# Patient Record
Sex: FEMALE | Race: WHITE | NOT HISPANIC OR LATINO | Employment: PART TIME | URBAN - METROPOLITAN AREA
[De-identification: names, ages, dates, MRNs, and addresses within clinical notes are randomized per-mention and may not be internally consistent; named-entity substitution may affect disease eponyms.]

---

## 2017-01-13 ENCOUNTER — ALLSCRIPTS OFFICE VISIT (OUTPATIENT)
Dept: OTHER | Facility: OTHER | Age: 67
End: 2017-01-13

## 2017-01-13 DIAGNOSIS — N60.19 DIFFUSE CYSTIC MASTOPATHY OF BREAST: ICD-10-CM

## 2017-01-23 ENCOUNTER — GENERIC CONVERSION - ENCOUNTER (OUTPATIENT)
Dept: OTHER | Facility: OTHER | Age: 67
End: 2017-01-23

## 2017-02-20 ENCOUNTER — GENERIC CONVERSION - ENCOUNTER (OUTPATIENT)
Dept: OTHER | Facility: OTHER | Age: 67
End: 2017-02-20

## 2017-03-23 ENCOUNTER — GENERIC CONVERSION - ENCOUNTER (OUTPATIENT)
Dept: OTHER | Facility: OTHER | Age: 67
End: 2017-03-23

## 2017-04-06 ENCOUNTER — HOSPITAL ENCOUNTER (OUTPATIENT)
Dept: MAMMOGRAPHY | Facility: CLINIC | Age: 67
Discharge: HOME | End: 2017-04-06
Payer: MEDICARE

## 2017-04-06 DIAGNOSIS — N60.19 DIFFUSE CYSTIC MASTOPATHY OF BREAST: ICD-10-CM

## 2017-04-06 PROCEDURE — G0202 SCR MAMMO BI INCL CAD: HCPCS

## 2017-04-06 PROCEDURE — 77063 BREAST TOMOSYNTHESIS BI: CPT

## 2017-04-20 ENCOUNTER — ALLSCRIPTS OFFICE VISIT (OUTPATIENT)
Dept: OTHER | Facility: OTHER | Age: 67
End: 2017-04-20

## 2017-04-20 ENCOUNTER — GENERIC CONVERSION - ENCOUNTER (OUTPATIENT)
Dept: OTHER | Facility: OTHER | Age: 67
End: 2017-04-20

## 2017-04-20 DIAGNOSIS — J45.909 UNCOMPLICATED ASTHMA: ICD-10-CM

## 2017-04-20 DIAGNOSIS — M81.0 AGE-RELATED OSTEOPOROSIS WITHOUT CURRENT PATHOLOGICAL FRACTURE: ICD-10-CM

## 2017-04-28 ENCOUNTER — GENERIC CONVERSION - ENCOUNTER (OUTPATIENT)
Dept: OTHER | Facility: OTHER | Age: 67
End: 2017-04-28

## 2017-05-31 ENCOUNTER — APPOINTMENT (OUTPATIENT)
Dept: LAB | Facility: HOSPITAL | Age: 67
End: 2017-05-31
Attending: INTERNAL MEDICINE
Payer: MEDICARE

## 2017-05-31 ENCOUNTER — GENERIC CONVERSION - ENCOUNTER (OUTPATIENT)
Dept: OTHER | Facility: OTHER | Age: 67
End: 2017-05-31

## 2017-05-31 ENCOUNTER — TRANSCRIBE ORDERS (OUTPATIENT)
Dept: ADMINISTRATIVE | Facility: HOSPITAL | Age: 67
End: 2017-05-31

## 2017-05-31 DIAGNOSIS — M81.0 AGE-RELATED OSTEOPOROSIS WITHOUT CURRENT PATHOLOGICAL FRACTURE: ICD-10-CM

## 2017-05-31 DIAGNOSIS — J45.909 UNCOMPLICATED ASTHMA: ICD-10-CM

## 2017-05-31 DIAGNOSIS — Z13.820 SCREENING FOR OSTEOPOROSIS: Primary | ICD-10-CM

## 2017-05-31 LAB
25(OH)D3 SERPL-MCNC: 19 NG/ML (ref 30–100)
ALBUMIN SERPL BCP-MCNC: 3.4 G/DL (ref 3.5–5)
ALP SERPL-CCNC: 92 U/L (ref 46–116)
ALT SERPL W P-5'-P-CCNC: 28 U/L (ref 12–78)
ANION GAP SERPL CALCULATED.3IONS-SCNC: 8 MMOL/L (ref 4–13)
AST SERPL W P-5'-P-CCNC: 19 U/L (ref 5–45)
BASOPHILS # BLD AUTO: 0.07 THOUSANDS/ΜL (ref 0–0.1)
BASOPHILS NFR BLD AUTO: 1 % (ref 0–1)
BILIRUB SERPL-MCNC: 0.6 MG/DL (ref 0.2–1)
BUN SERPL-MCNC: 20 MG/DL (ref 5–25)
CALCIUM SERPL-MCNC: 8.8 MG/DL (ref 8.3–10.1)
CHLORIDE SERPL-SCNC: 107 MMOL/L (ref 100–108)
CHOLEST SERPL-MCNC: 202 MG/DL (ref 50–200)
CO2 SERPL-SCNC: 28 MMOL/L (ref 21–32)
CREAT SERPL-MCNC: 0.88 MG/DL (ref 0.6–1.3)
EOSINOPHIL # BLD AUTO: 0.39 THOUSAND/ΜL (ref 0–0.61)
EOSINOPHIL NFR BLD AUTO: 5 % (ref 0–6)
ERYTHROCYTE [DISTWIDTH] IN BLOOD BY AUTOMATED COUNT: 12.9 % (ref 11.6–15.1)
GFR SERPL CREATININE-BSD FRML MDRD: >60 ML/MIN/1.73SQ M
GLUCOSE P FAST SERPL-MCNC: 95 MG/DL (ref 65–99)
HCT VFR BLD AUTO: 40.8 % (ref 34.8–46.1)
HDLC SERPL-MCNC: 58 MG/DL (ref 40–60)
HGB BLD-MCNC: 13.5 G/DL (ref 11.5–15.4)
LDLC SERPL CALC-MCNC: 128 MG/DL (ref 0–100)
LYMPHOCYTES # BLD AUTO: 1.85 THOUSANDS/ΜL (ref 0.6–4.47)
LYMPHOCYTES NFR BLD AUTO: 24 % (ref 14–44)
MCH RBC QN AUTO: 30.4 PG (ref 26.8–34.3)
MCHC RBC AUTO-ENTMCNC: 33.1 G/DL (ref 31.4–37.4)
MCV RBC AUTO: 92 FL (ref 82–98)
MONOCYTES # BLD AUTO: 0.62 THOUSAND/ΜL (ref 0.17–1.22)
MONOCYTES NFR BLD AUTO: 8 % (ref 4–12)
NEUTROPHILS # BLD AUTO: 4.81 THOUSANDS/ΜL (ref 1.85–7.62)
NEUTS SEG NFR BLD AUTO: 62 % (ref 43–75)
PLATELET # BLD AUTO: 312 THOUSANDS/UL (ref 149–390)
PMV BLD AUTO: 9.6 FL (ref 8.9–12.7)
POTASSIUM SERPL-SCNC: 4.5 MMOL/L (ref 3.5–5.3)
PROT SERPL-MCNC: 6.9 G/DL (ref 6.4–8.2)
RBC # BLD AUTO: 4.44 MILLION/UL (ref 3.81–5.12)
SODIUM SERPL-SCNC: 143 MMOL/L (ref 136–145)
TRIGL SERPL-MCNC: 78 MG/DL
WBC # BLD AUTO: 7.74 THOUSAND/UL (ref 4.31–10.16)

## 2017-05-31 PROCEDURE — 82306 VITAMIN D 25 HYDROXY: CPT

## 2017-05-31 PROCEDURE — 36415 COLL VENOUS BLD VENIPUNCTURE: CPT

## 2017-05-31 PROCEDURE — 80053 COMPREHEN METABOLIC PANEL: CPT

## 2017-05-31 PROCEDURE — 80061 LIPID PANEL: CPT

## 2017-05-31 PROCEDURE — 85025 COMPLETE CBC W/AUTO DIFF WBC: CPT

## 2017-06-02 ENCOUNTER — HOSPITAL ENCOUNTER (OUTPATIENT)
Dept: MAMMOGRAPHY | Facility: CLINIC | Age: 67
Discharge: HOME | End: 2017-06-02
Payer: MEDICARE

## 2017-06-02 DIAGNOSIS — M81.0 AGE-RELATED OSTEOPOROSIS WITHOUT CURRENT PATHOLOGICAL FRACTURE: ICD-10-CM

## 2017-06-15 ENCOUNTER — HOSPITAL ENCOUNTER (OUTPATIENT)
Dept: RADIOLOGY | Facility: HOSPITAL | Age: 67
Discharge: HOME | End: 2017-06-15
Payer: MEDICARE

## 2017-06-15 ENCOUNTER — TRANSCRIBE ORDERS (OUTPATIENT)
Dept: ADMINISTRATIVE | Facility: HOSPITAL | Age: 67
End: 2017-06-15

## 2017-06-15 ENCOUNTER — ALLSCRIPTS OFFICE VISIT (OUTPATIENT)
Dept: OTHER | Facility: OTHER | Age: 67
End: 2017-06-15

## 2017-06-15 DIAGNOSIS — R07.81 PLEURODYNIA: ICD-10-CM

## 2017-06-15 DIAGNOSIS — J98.01 ACUTE BRONCHOSPASM: ICD-10-CM

## 2017-06-15 PROCEDURE — 71101 X-RAY EXAM UNILAT RIBS/CHEST: CPT

## 2017-06-28 ENCOUNTER — HOSPITAL ENCOUNTER (OUTPATIENT)
Dept: BONE DENSITY | Facility: IMAGING CENTER | Age: 67
Discharge: HOME | End: 2017-06-28
Payer: MEDICARE

## 2017-06-28 DIAGNOSIS — Z13.820 SCREENING FOR OSTEOPOROSIS: ICD-10-CM

## 2017-06-28 PROCEDURE — 77080 DXA BONE DENSITY AXIAL: CPT

## 2017-06-30 ENCOUNTER — GENERIC CONVERSION - ENCOUNTER (OUTPATIENT)
Dept: OTHER | Facility: OTHER | Age: 67
End: 2017-06-30

## 2018-01-10 NOTE — RESULT NOTES
Verified Results  MAMMO SCREENING BILATERAL W 3D & CAD 06Apr2017 07:24AM Antonio Buckner Order Number: [id number]   Performing Comments: hx of dense breasts and cystic changes with prior biopsy   - Patient Instructions: To schedule this appointment, please contact Central Scheduling at [phone]  Do not wear any perfume, powder, lotion or deodorant on breast or    underarm area  Please bring your doctors order, referral (if needed) and insurance information with you on the day of the test  Failure to bring this information may result in this test being rescheduled  Arrive 15 minutes prior to your appointment time to register  On the day of your test, please bring any prior mammogram or breast studies with you that were not performed at a [facility]  Failure to bring prior exams may result in your test needing to be rescheduled  Test Name Result Flag Reference   MAMMO SCREENING BILATERAL W 3D & CAD (Report)     ADDENDUM:   Prior studies were made available to me  There is no change in    the findings or recommendations  Patient History:   Patient is postmenopausal    Took hormonal contraceptives for 15 years beginning at age 22  Patient has never smoked  Patient's BMI is 30 5  Reason for exam: screening, asymptomatic  Mammo Screening Bilateral W DBT and CAD: April 6, 2017 - Check In   #: [de-identified]   2D/3D Procedure   3D views: Bilateral MLO view(s) were taken  2D views: Bilateral MLO and CC view(s) were taken  Technologist: JEWELS Linton (R)(M)   The breast tissue is heterogeneously dense, potentially limiting    the sensitivity of mammography  Patient risk, included in this    report, assists in determining the appropriate screening regimen    (such as 3-D mammography or the inclusion of automated breast    ultrasound or MRI)  3-D mammography may also remain indicated as    screening  Multiple prior studies were available for comparison       No dominant soft tissue mass, architectural distortion or    suspicious calcifications are noted in either breast   The skin    and nipple structures are within normal limits  Scattered benign   appearing calcifications are noted  No significant changes when compared with prior studies  ACR BI-RADSï¾® Assessments: BiRad:2 - Benign     Recommendation:   Routine screening mammogram of both breasts in 1 year  A    reminder letter will be scheduled  8-10% of cancers will be missed on mammography  Management of a    palpable abnormality must be based on clinical grounds  Patients    will be notified of their results via letter from our facility  Accredited by [employer] of [employer] and FDA  Transcription Location: JEWELS [place] 98: [id number]     Risk Value(s):   Tyrer-Cuzick 10 Year: 2 200%, Tyrer-Cuzick Lifetime: 4 500%,    Myriad Table: 1 5%, CHAPIN 5 Year: 1 5%, NCI Lifetime: 5 6%   Patient History:   Patient is postmenopausal    Took hormonal contraceptives for 15 years beginning at age 22  Patient has never smoked  Patient's BMI is 30 5  Reason for exam: screening, asymptomatic  Mammo Screening Bilateral W DBT and CAD: April 6, 2017 - Check In   #: [de-identified]   2D/3D Procedure   3D views: Bilateral MLO view(s) were taken  2D views: Bilateral MLO and CC view(s) were taken  Technologist: JEWELS Watkins (R)(M)   The breast tissue is heterogeneously dense, potentially limiting    the sensitivity of mammography  Patient risk, included in this    report, assists in determining the appropriate screening regimen    (such as 3-D mammography or the inclusion of automated breast    ultrasound or MRI)  3-D mammography may also remain indicated as    screening  Multiple prior studies were available for comparison  No dominant soft tissue mass, architectural distortion or    suspicious calcifications are noted in either breast   The skin    and nipple structures are within normal limits   Scattered benign   appearing calcifications are noted  No significant changes when compared with prior studies  ACR BI-RADSï¾® Assessments: BiRad:2 - Benign     Recommendation:   Routine screening mammogram of both breasts in 1 year  A    reminder letter will be scheduled  8-10% of cancers will be missed on mammography  Management of a    palpable abnormality must be based on clinical grounds  Patients    will be notified of their results via letter from our facility  Accredited by [employer] and FDA       Transcription Location: [facility] 98: [id number]     Risk Value(s):   Tyrer-Cuzick 10 Year: 2 200%, Tyrer-Cuzick Lifetime: 4 500%,    Myriad Table: 1 5%, CHAPIN 5 Year: 1 5%, NCI Lifetime: 5 6%

## 2018-01-11 NOTE — PROGRESS NOTES
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)   3  GERD (gastroesophageal reflux disease) (530 81) (K21 9)   4  Asthma without status asthmaticus without complication (819 44) (B37 865)   5  Osteoporosis (733 00) (M81 0)    Plan  Asthma without status asthmaticus without complication    · (1) CBC/PLT/DIFF; Status:Active; Requested for:20Apr2017;    · (1) COMPREHENSIVE METABOLIC PANEL; Status:Active; Requested for:20Apr2017;    · (1) LIPID PANEL FASTING W DIRECT LDL REFLEX; Status:Active; Requested  for:20Apr2017;   Osteoporosis    · (1) VITAMIN D 25-HYDROXY; Status:Active; Requested for:20Apr2017;    · * DXA BONE DENSITY SPINE HIP AND PELVIS; Status:Hold For - Scheduling;  Requested for:20Apr2017;   Screening for genitourinary condition    · *VB - Urinary Incontinence Screen (Dx Z13 89 Screen for UI); Status:Complete -  Retrospective By Protocol Authorization;   Done: 20Apr2017 04:13PM    Discussion/Summary    1, do labs- fasting  2  History of Present Illness  Welcome to Medicare and Wellness Visits: The patient is being seen for the welcome to medicare visit  Medicare Screening and Risk Factors   Hospitalizations: no previous hospitalizations  Once per lifetime medicare screening tests: AAA screening US has not yet been done  Medicare Screening Tests Risk Questions   Osteoporosis risk assessment: female gender, over 48years of age and past medical history of fracture(s)  Drug and Alcohol Use: The patient has never smoked cigarettes and has never used smokeless tobacco  The patient reports occasional alcohol use  She has never used illicit drugs     Diet and Physical Activity: Current diet includes well balanced meals, 2 servings of fruit per day, 2 servings of vegetables per day, 1 servings of meat per day, 2 servings of whole grains per day, 3 servings of dairy products per day, 2-3 cups of coffee per day and 1 cups of tea per day  She exercises infrequently  Exercise: walking, stretching 1 1/2 hours per week  Mood Disorder and Cognitive Impairment Screening: She denies feeling down, depressed, or hopeless over the past two weeks  She denies feeling little interest or pleasure in doing things over the past two weeks  Functional Ability/Level of Safety: Hearing is normal bilaterally and a hearing aid is not used  The patient is currently able to do activities of daily living without limitations, able to do instrumental activities of daily living without limitations, able to participate in social activities without limitations and able to drive without limitations  Fall risk factors: The patient fell 0 times in the past 12 months  Advance Directives: Advance directives: given 5 wishes form, but no living will, no durable power of  for health care directives and no advance directives  end of life decisions were reviewed with the patient  Concerns with the patient's end of life decisions: would want full code- but would not want long term vent or support if no hope for recovery  Co-Managers and Medical Equipment/Suppliers: See Patient Care Team      Patient Care Team    Care Team Member Role Specialty Office Number   [place] [address]  Internal Medicine [phone]   Formerly [facility], ED   [phone]     Review of Systems    Constitutional: has had improvement in respiratory symotoms- had severe episde last year, but no fever and no chills  ENT: nasal congestion and nasal discharge  Cardiovascular: no chest pain  Respiratory: no shortness of breath, no wheezing, no cough and no dry cough  Gastrointestinal: negative  Genitourinary: negative  Active Problems    1  Advance directive discussed with patient (V65 49) (Z07 89)   2  Allergic rhinitis (477 9) (J30 9)   3  Asthma without status asthmaticus without complication (393 93) (E91 141)   4  Cataract (366 9) (H26 9)   5   Colon cancer screening (V76 51) (Z12 11)   6  Diffuse cystic mastopathy (610 1) (N60 19)   7  Encounter for other screening for malignant neoplasm of breast (V76 19) (Z12 39)   8  GERD (gastroesophageal reflux disease) (530 81) (K21 9)   9  Macular degeneration, wet (362 52) (H35 3290)   10  Denied: History of Mental health problem   11  Need for immunization against influenza (V04 81) (Z23)   12  Need for Tdap vaccination (V06 1) (Z23)   13  No advance directives (V49 89) (Z78 9)   14  Osteoporosis (733 00) (M81 0)   15  Pre-op examination (V72 84) (Z01 818)   16  Prophylactic antibiotic for dental procedure indicated due to prior joint replacement    (V58 62) (Z79 2)   17  Denied: History of Substance abuse    Past Medical History    · History of Closed femur fracture (821 00) (S72 90XA)   · History of Contact dermatitis due to poison ivy (692 6) (L23 7)   · History of Epistaxis (784 7) (R04 0)   · History of influenza pneumonia (V12 61) (Z87 01)   · History of Impacted cerumen of right ear (380 4) (H61 21)   · History of Influenza B (487 1) (J10 1)   · Denied: History of Mental health problem   · Denied: History of Substance abuse   · History of Thyroid condition (246 9) (E07 9)   · History of Tracheobronchitis (490) (J40)   · 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)   · History of Trochanteric bursitis of left hip (726 5) (M70 62)   · History of Weight gain (783 1) (R63 5)    Surgical History    · History of Endoscopic Retrograde Cholangiopancreatography (ERCP)   · History of Hysterectomy    Family History  Mother    · Family history of Pancreatic cancer  Father    · Family history of Heart disease  Family History    · Denied: Family history of substance abuse   · Family history of Heart attack   · Denied: Family history of Mental problem    The family history was reviewed and updated today         Social History    · Caffeine use (V49 89) (F15 90)   · COFFEE DAILY   · Cultural background   · NOT  OR  · Dental care, regularly   · Living Situation: Supportive and safe   ·    · Never a smoker   · No advance directives (V49 89) (Z78 9)   · Occasional alcohol use   · Primary spoken language English   · Racial background   · WHITE   · Spouse/family support  The social history was reviewed and updated today  Current Meds   1  Advair Diskus 250-50 MCG/DOSE Inhalation Aerosol Powder Breath Activated; USE 1   INHALATION ORALLY TWICE DAILY; Therapy: 59GEA9795 to (Evaluate:06Rhm8083)  Requested for: 41Tlv0325; Last   Rx:57Rvf6317 Ordered   2  Albuterol Sulfate (2 5 MG/3ML) 0 083% Inhalation Nebulization Solution; 1 vial every 4   hours as needed for cough or wheezing; Therapy: 15HNZ2079 to (Last Rx:14Mar2016)  Requested for: 96HNV7126 Ordered   3  Amoxicillin 500 MG Oral Tablet; TAKE 4 TABLETS 1 HOUR BEFORE DENTAL   APPOINTMENT; Therapy: 65TJT5563 to (Brii Malone)  Requested for: 69EAL3963; Last   Rx:22Oct2015 Ordered   4  Loratadine 10 MG Oral Tablet; TAKE 1 TABLET DAILY; Therapy: 96YMT0500 to (Evaluate:38Zao5049) Recorded   5  Montelukast Sodium 10 MG Oral Tablet; take 1 tablet by mouth daily; Therapy: 55WWQ1904 to (Evaluate:08Jan2018)  Requested for: 44TWI3453; Last   Rx:13Jan2017 Ordered   6  Multi-Vitamin Oral Tablet; TAKE 1 TABLET DAILY; Therapy: (Recorded:50Idc6553) to Recorded   7  Omeprazole 20 MG Oral Capsule Delayed Release; TAKE 1 CAPSULE BY MOUTH    TWICE DAILY; Therapy: 73GRA6784 to (Evaluate:08Jan2018)  Requested for: 88QSP0109; Last   Rx:13Jan2017 Ordered   8  Ventolin  (90 Base) MCG/ACT Inhalation Aerosol Solution; INHALE 2 PUFFS 4   times daily; Therapy: 13JDL2267 to (Last Rx:17Nov2014)  Requested for: 74FZA9977 Ordered    The medication list was reviewed and updated today  Allergies    1  Codeine Derivatives   2  Daypro   3  erythromycin   4  Lodine   5  Mobic   6   Tetracyclines    Immunizations   1 2 3    Influenza  16-Oct-2013 06-Oct-2014 22-Oct-2015 PPSV  07-Nov-2012      Tdap  22-Oct-2015      Tetanus  Jan 2006      Zoster  18-Jun-2012       Physical Exam    Ears, Nose, Mouth, and Throat   External inspection of ears and nose: Normal     Otoscopic examination: Tympanic membranes translucent with normal light reflex  Canals patent without erythema  Nasal mucosa, septum, and turbinates: Normal without edema or erythema  Oropharynx: Normal with no erythema, edema, exudate or lesions  Neck   Thyroid: Normal, no thyromegaly  Pulmonary   Auscultation of lungs: Clear to auscultation  Cardiovascular   Auscultation of heart: Normal rate and rhythm, normal S1 and S2, no murmurs  Carotid pulses: 2+ bilaterally  Abdomen   Abdomen: Non-tender, no masses  Results/Data  Falls Risk Assessment (Dx Z13 89 Screen for Neurologic Disorder) 68Fsv5381 04:13PM User, Ahs     Test Name Result Flag Reference   Falls Risk      No falls in the past year     *VB - Urinary Incontinence Screen (Dx Z13 89 Screen for UI) 86Asg3997 04:13PM Henry Bell     Test Name Result Flag Reference   Urinary Incontinence Assessment 20Apr2017       PHQ-2 Adult Depression Screening 43Vku4285 04:12PM User, Ahs     Test Name Result Flag Reference   PHQ-2 Adult Depression Score 0     Over the last two weeks, how often have you been bothered by any of the following problems? Little interest or pleasure in doing things: Not at all - 0  Feeling down, depressed, or hopeless: Not at all - 0   PHQ-2 Adult Depression Screening Negative         Procedure    Procedure:   Inforrmation supplied by a Snellen chart  Results: 20/20/20 in both eyes without corrective device, 20/20/30 in the right eye without corrective device, 20/20/20 in the left eye without corrective device      Signatures   Electronically signed by : Ander Reyes DO;  Apr 20 2017  5:00PM EST                       (Author)

## 2018-01-12 NOTE — RESULT NOTES
Verified Results  (1) COMPREHENSIVE METABOLIC PANEL 39JPD3738 21:43YA [facility] Order Number: [id number]     Test Name Result Flag Reference   SODIUM 143 mmol/L  136-145   POTASSIUM 4 5 mmol/L  3 5-5 3   CHLORIDE 107 mmol/L  100-108   CARBON DIOXIDE 28 mmol/L  21-32   ANION GAP (CALC) 8 mmol/L  4-13   BLOOD UREA NITROGEN 20 mg/dL  5-25   CREATININE 0 88 mg/dL  0 60-1 30   Standardized to IDMS reference method   CALCIUM 8 8 mg/dL  8 3-10 1   BILI, TOTAL 0 60 mg/dL  0 20-1 00   ALK PHOSPHATAS 92 U/L     ALT (SGPT) 28 U/L  12-78   AST(SGOT) 19 U/L  5-45   ALBUMIN 3 4 g/dL L 3 5-5 0   TOTAL PROTEIN 6 9 g/dL  6 4-8 2   eGFR Non-African American      >60 0 ml/min/1 73sq [facility] Disease Education Program recommendations are as follows:  GFR calculation is accurate only with a steady state creatinine  Chronic Kidney disease less than 60 ml/min/1 73 sq  meters  Kidney failure less than 15 ml/min/1 73 sq  meters  GLUCOSE FASTING 95 mg/dL  65-99     (1) CBC/PLT/DIFF 21LGP7869 07:08AM Laurita [employer] Order Number: [id number]     Test Name Result Flag Reference   WBC COUNT 7 74 Thousand/uL  4 31-10 16   RBC COUNT 4 44 Million/uL  3 81-5 12   HEMOGLOBIN 13 5 g/dL  11 5-15 4   HEMATOCRIT 40 8 %  34 8-46  1   MCV 92 fL  82-98   MCH 30 4 pg  26 8-34 3   MCHC 33 1 g/dL  31 4-37 4   RDW 12 9 %  11 6-15 1   MPV 9 6 fL  8 9-12 7   PLATELET COUNT 884 Thousands/uL  149-390   NEUTROPHILS RELATIVE PERCENT 62 %  43-75   LYMPHOCYTES RELATIVE PERCENT 24 %  14-44   MONOCYTES RELATIVE PERCENT 8 %  4-12   EOSINOPHILS RELATIVE PERCENT 5 %  0-6   BASOPHILS RELATIVE PERCENT 1 %  0-1   NEUTROPHILS ABSOLUTE COUNT 4 81 Thousands/? ??L  1 85-7 62   LYMPHOCYTES ABSOLUTE COUNT 1 85 Thousands/? ??L  0 60-4 47   MONOCYTES ABSOLUTE COUNT 0 62 Thousand/? ??L  0 17-1 22   EOSINOPHILS ABSOLUTE COUNT 0 39 Thousand/? ??L  0 00-0 61   BASOPHILS ABSOLUTE COUNT 0 07 Thousands/? ??L  0 00-0 10     (1) LIPID PANEL FASTING W DIRECT LDL REFLEX 58XTA0502 07:08AM [employer] Order Number: [id number]     Test Name Result Flag Reference   CHOLESTEROL 202 mg/dL H    LDL CHOLESTEROL CALCULATED 128 mg/dL H 0-100   Triglyceride:         Normal              <150 mg/dl       Borderline High    150-199 mg/dl       High               200-499 mg/dl       Very High          >499 mg/dl  Cholesterol:         Desirable        <200 mg/dl      Borderline High  200-239 mg/dl      High             >239 mg/dl  HDL Cholesterol:        High    >59 mg/dL      Low     <41 mg/dL  LDL Cholesterol:        Optimal          <100 mg/dl        Near Optimal     100-129 mg/dl        Above Optimal          Borderline High   130-159 mg/dl          High              160-189 mg/dl          Very High        >189 mg/dl  LDL CALCULATED:    This screening LDL is a calculated result  It does not have the accuracy of the Direct Measured LDL in the monitoring of patients with hyperlipidemia and/or statin therapy  Direct Measure LDL (DBV914) must be ordered separately in these patients  TRIGLYCERIDES 78 mg/dL  <=150   Specimen collection should occur prior to N-Acetylcysteine or Metamizole administration due to the potential for falsely depressed results  HDL,DIRECT 58 mg/dL  40-60   Specimen collection should occur prior to Metamizole administration due to the potential for falsely depressed results  (1) VITAMIN D 25-HYDROXY 91SJG9004 07:08AM [employer] Order Number: [id number]     Test Name Result Flag Reference   VIT D 25-HYDROX 19 0 ng/mL L 30 0-100 0   This assay is a certified procedure of the CDC Vitamin D Standardization Certification Program (VDSCP)     Deficiency <20ng/ml   Insufficiency 20-30ng/ml   Sufficient  ng/ml     *Patients undergoing fluorescein dye angiography may retain small amounts of fluorescein in the body for 48-72 hours post procedure   Samples containing fluorescein can produce falsely elevated Vitamin D values  If the patient had this procedure, a specimen should be resubmitted post fluorescein clearance

## 2018-01-12 NOTE — RESULT NOTES
Verified Results  * XR CHEST PA & LATERAL 34VHQ6899 07:34AM Tchula Chimera     Test Name Result Flag Reference   XR CHEST PA & LATERAL (Report)     CHEST      INDICATION: Cough and shortness of breath  Pneumonia  Follow-up     COMPARISON: Chest radiographs February 26, 2016     VIEWS: Frontal and lateral projections; 2 images     FINDINGS:        Cardiomediastinal silhouette appears unremarkable  Atherosclerotic changes in the aorta  The lungs are clear  Resolution of bibasilar infiltrates  No pneumothorax or pleural effusion  Visualized osseous structures appear within normal limits for the patient's age  Degenerative and postoperative changes right shoulder  IMPRESSION:   No active pulmonary disease  Resolution of bibasilar pneumonia         Workstation performed: [id number]     Signed by:   Navya Cullen DO   3/16/16

## 2018-01-13 VITALS
RESPIRATION RATE: 16 BRPM | BODY MASS INDEX: 32.65 KG/M2 | SYSTOLIC BLOOD PRESSURE: 142 MMHG | DIASTOLIC BLOOD PRESSURE: 78 MMHG | HEIGHT: 65 IN | WEIGHT: 196 LBS | HEART RATE: 72 BPM

## 2018-01-13 VITALS
HEIGHT: 65 IN | SYSTOLIC BLOOD PRESSURE: 140 MMHG | DIASTOLIC BLOOD PRESSURE: 90 MMHG | HEART RATE: 60 BPM | TEMPERATURE: 98.9 F

## 2018-01-16 NOTE — RESULT NOTES
Verified Results  * DXA BONE DENSITY SPINE HIP AND PELVIS 28Jun2017 09:13AM Alexander Pérez     Test Name Result Flag Reference   DXA BONE DENSITY SPINE HIP AND PELVIS (Report)     DXA SCAN     CLINICAL HISTORY: 61-year-old woman  Menopause at age 28 (JEANNINE/BSO)  OTHER RISK FACTORS: History of fractures following low levels of trauma  COPD  Prilosec therapy for reflux  TECHNIQUE: Bone densitometry was performed using a Hologic Horizon A bone densitometer  Regions of interest appear properly placed  COMPARISON: February 28, 2012  RESULTS:      LUMBAR SPINE L1-L4: BMD 0 772 gm/cm2 / T-score -2 5 / Z score -0 7   (Lumbar levels within parentheses [if any] represent vertebrae excluded from analysis due to local structural abnormalities or artifact)  LEFT TOTAL HIP: BMD 0 749 gm/cm2 / T-score -1 6 / Z score -0 3   LEFT FEMORAL NECK: BMD 0 644 gm/cm2 / T score -1 8 / Z score -0 3       CHANGE: Since the last DXA:   LUMBAR SPINE BMD has decreased 0 009 gm/cm2 or 1 2%  This change is not statistically significant  HIP BMD has decreased 0 009 gm/cm2 or 1 3%  This change is not statistically significant  IMPRESSION:     1  Osteoporosis, based on the lumbar spine  2  Since a DXA study from February 28, 2012, there has been no statistically significant change in bone mineral density  2  The 10 year risk of hip fracture is 2 1% with the 10 year risk of major osteoporotic fracture being 16% as calculated by the [facility]/WHO fracture risk assessment tool (FRAX)  3  The current NOF guidelines recommend treating patients with a T-score of -2 5 or less in the lumbar spine or hips, or in post-menopausal women and men over the age of 48 with low bone mass (osteopenia) and a FRAX 10 year risk score of >3% for hip    fracture and/or >20% for major osteoporotic fracture       4  A daily intake of at least 1200 mg calcium and vitamin D 800- 1000 IU, as well as weight bearing and muscle strengthening exercise, fall prevention and avoidance of tobacco and excessive alcohol as preventive measurements are suggested  5  Follow-up DXA in two years is recommended for most patients  A one year follow-up DXA is recommended after initiation or change in therapy for osteoporosis  More frequent evaluation is also appropriate for patients with conditions associated with    rapid bone loss, such as glucocorticoid therapy  The FRAX tool has not been validated in patients currently or previously treated with pharmacotherapy for osteoporosis  In such patients, clinical judgment must be exercised in interpreting FRAX scores  It is not appropriate to use FRAX to monitor    treatment response         WHO CLASSIFICATION:   Normal (a T-score of -1 0 or higher)   Low bone mineral density (a T-score of less than -1 0 but higher than -2 5)   Osteoporosis (a T-score of -2 5 or less)   Severe osteoporosis (a T-score of -2 5 or less with a fragility fracture)     Least significant change (lumbar spine): 0 022 g/cm2; 2 3%   Least significant change (total hip): 0 022 g/cm2; 3 2%   Least significant change (forearm): 0 008 g/cm2; 1 2%       Workstation performed: [id number]     Signed by:   Randell Zhong MD   6/28/17

## 2018-01-17 NOTE — RESULT NOTES
Verified Results  MAMMO SCREENING BILATERAL W 3D & CAD 06Apr2017 07:24AM Pete Smith Order Number: [id number]   Performing Comments: hx of dense breasts and cystic changes with prior biopsy   - Patient Instructions: To schedule this appointment, please contact Central Scheduling at [phone]  Do not wear any perfume, powder, lotion or deodorant on breast or    underarm area  Please bring your doctors order, referral (if needed) and insurance information with you on the day of the test  Failure to bring this information may result in this test being rescheduled  Arrive 15 minutes prior to your appointment time to register  On the day of your test, please bring any prior mammogram or breast studies with you that were not performed at a [facility]  Failure to bring prior exams may result in your test needing to be rescheduled  Test Name Result Flag Reference   MAMMO SCREENING BILATERAL W 3D & CAD (Report)     Patient History:   Patient is postmenopausal    Took hormonal contraceptives for 15 years beginning at age 22  Patient has never smoked  Patient's BMI is 30 5  Reason for exam: screening, asymptomatic  Mammo Screening Bilateral W DBT and CAD: April 6, 2017 - Check In   #: [de-identified]   2D/3D Procedure   3D views: Bilateral MLO view(s) were taken  2D views: Bilateral MLO and CC view(s) were taken  Technologist: JEWELS Slater (R)(M)   The breast tissue is heterogeneously dense, potentially limiting    the sensitivity of mammography  Patient risk, included in this    report, assists in determining the appropriate screening regimen    (such as 3-D mammography or the inclusion of automated breast    ultrasound or MRI)  3-D mammography may also remain indicated as    screening  Multiple prior studies were available for comparison       No dominant soft tissue mass, architectural distortion or    suspicious calcifications are noted in either breast   The skin    and nipple structures are within normal limits  Scattered benign   appearing calcifications are noted  No significant changes when compared with prior studies  ACR BI-RADSï¾® Assessments: BiRad:2 - Benign     Recommendation:   Routine screening mammogram of both breasts in 1 year  A    reminder letter will be scheduled  8-10% of cancers will be missed on mammography  Management of a    palpable abnormality must be based on clinical grounds  Patients    will be notified of their results via letter from our facility  Accredited by [employer] of [employer] and FDA       Transcription Location: [facility] 98: [id number]     Risk Value(s):   Tyrer-Cuzick 10 Year: 2 200%, Tyrer-Cuzick Lifetime: 4 500%,    Myriad Table: 1 5%, CHAPNI 5 Year: 1 5%, NCI Lifetime: 5 6%

## 2018-01-25 ENCOUNTER — APPOINTMENT (EMERGENCY)
Dept: RADIOLOGY | Facility: HOSPITAL | Age: 68
DRG: 194 | End: 2018-01-25
Payer: MEDICARE

## 2018-01-25 ENCOUNTER — HOSPITAL ENCOUNTER (INPATIENT)
Facility: HOSPITAL | Age: 68
LOS: 2 days | Discharge: HOME | DRG: 194 | End: 2018-01-28
Attending: EMERGENCY MEDICINE | Admitting: INTERNAL MEDICINE
Payer: MEDICARE

## 2018-01-25 DIAGNOSIS — R05.9 COUGH: ICD-10-CM

## 2018-01-25 DIAGNOSIS — R50.9 FEVER: ICD-10-CM

## 2018-01-25 DIAGNOSIS — J38.5 LARYNGOSPASMS: ICD-10-CM

## 2018-01-25 DIAGNOSIS — J40 BRONCHITIS: ICD-10-CM

## 2018-01-25 DIAGNOSIS — R68.89 FLU-LIKE SYMPTOMS: ICD-10-CM

## 2018-01-25 DIAGNOSIS — J45.909 ASTHMA: ICD-10-CM

## 2018-01-25 DIAGNOSIS — D72.829 LEUKOCYTOSIS: ICD-10-CM

## 2018-01-25 DIAGNOSIS — R00.0 TACHYCARDIA: Primary | ICD-10-CM

## 2018-01-25 LAB
BASOPHILS # BLD AUTO: 0.05 THOUSANDS/ΜL (ref 0–0.1)
BASOPHILS NFR BLD AUTO: 1 % (ref 0–1)
EOSINOPHIL # BLD AUTO: 0.25 THOUSAND/ΜL (ref 0–0.61)
EOSINOPHIL NFR BLD AUTO: 2 % (ref 0–6)
ERYTHROCYTE [DISTWIDTH] IN BLOOD BY AUTOMATED COUNT: 12.8 % (ref 11.6–15.1)
HCT VFR BLD AUTO: 40.6 % (ref 34.8–46.1)
HGB BLD-MCNC: 13.8 G/DL (ref 11.5–15.4)
LYMPHOCYTES # BLD AUTO: 1.06 THOUSANDS/ΜL (ref 0.6–4.47)
LYMPHOCYTES NFR BLD AUTO: 10 % (ref 14–44)
MCH RBC QN AUTO: 30.9 PG (ref 26.8–34.3)
MCHC RBC AUTO-ENTMCNC: 34 G/DL (ref 31.4–37.4)
MCV RBC AUTO: 91 FL (ref 82–98)
MONOCYTES # BLD AUTO: 1.05 THOUSAND/ΜL (ref 0.17–1.22)
MONOCYTES NFR BLD AUTO: 10 % (ref 4–12)
NEUTROPHILS # BLD AUTO: 8.39 THOUSANDS/ΜL (ref 1.85–7.62)
NEUTS SEG NFR BLD AUTO: 77 % (ref 43–75)
PLATELET # BLD AUTO: 315 THOUSANDS/UL (ref 149–390)
PMV BLD AUTO: 9.5 FL (ref 8.9–12.7)
RBC # BLD AUTO: 4.46 MILLION/UL (ref 3.81–5.12)
WBC # BLD AUTO: 10.8 THOUSAND/UL (ref 4.31–10.16)

## 2018-01-25 PROCEDURE — 87040 BLOOD CULTURE FOR BACTERIA: CPT | Performed by: EMERGENCY MEDICINE

## 2018-01-25 PROCEDURE — 85025 COMPLETE CBC W/AUTO DIFF WBC: CPT | Performed by: EMERGENCY MEDICINE

## 2018-01-25 PROCEDURE — 83605 ASSAY OF LACTIC ACID: CPT | Performed by: EMERGENCY MEDICINE

## 2018-01-25 PROCEDURE — 80053 COMPREHEN METABOLIC PANEL: CPT | Performed by: EMERGENCY MEDICINE

## 2018-01-25 PROCEDURE — 96375 TX/PRO/DX INJ NEW DRUG ADDON: CPT

## 2018-01-25 PROCEDURE — 87798 DETECT AGENT NOS DNA AMP: CPT | Performed by: EMERGENCY MEDICINE

## 2018-01-25 PROCEDURE — 93005 ELECTROCARDIOGRAM TRACING: CPT

## 2018-01-25 PROCEDURE — 36415 COLL VENOUS BLD VENIPUNCTURE: CPT | Performed by: EMERGENCY MEDICINE

## 2018-01-25 PROCEDURE — 96374 THER/PROPH/DIAG INJ IV PUSH: CPT

## 2018-01-25 RX ORDER — METHYLPREDNISOLONE SODIUM SUCCINATE 125 MG/2ML
125 INJECTION, POWDER, LYOPHILIZED, FOR SOLUTION INTRAMUSCULAR; INTRAVENOUS ONCE
Status: COMPLETED | OUTPATIENT
Start: 2018-01-25 | End: 2018-01-25

## 2018-01-25 RX ORDER — LIDOCAINE HYDROCHLORIDE 40 MG/ML
2.5 INJECTION, SOLUTION RETROBULBAR; TOPICAL ONCE
Status: COMPLETED | OUTPATIENT
Start: 2018-01-26 | End: 2018-01-26

## 2018-01-25 RX ORDER — SODIUM CHLORIDE FOR INHALATION 0.9 %
VIAL, NEBULIZER (ML) INHALATION
Status: COMPLETED
Start: 2018-01-25 | End: 2018-01-26

## 2018-01-25 RX ORDER — KETOROLAC TROMETHAMINE 30 MG/ML
15 INJECTION, SOLUTION INTRAMUSCULAR; INTRAVENOUS ONCE
Status: COMPLETED | OUTPATIENT
Start: 2018-01-25 | End: 2018-01-25

## 2018-01-25 RX ORDER — SODIUM CHLORIDE FOR INHALATION 0.9 %
VIAL, NEBULIZER (ML) INHALATION
Status: COMPLETED
Start: 2018-01-25 | End: 2018-01-25

## 2018-01-25 RX ADMIN — SODIUM CHLORIDE 1000 ML: 0.9 INJECTION, SOLUTION INTRAVENOUS at 23:45

## 2018-01-25 RX ADMIN — RACEPINEPHRINE HYDROCHLORIDE 0.5 ML: 11.25 SOLUTION RESPIRATORY (INHALATION) at 23:57

## 2018-01-25 RX ADMIN — KETOROLAC TROMETHAMINE 15 MG: 30 INJECTION, SOLUTION INTRAMUSCULAR; INTRAVENOUS at 23:53

## 2018-01-25 RX ADMIN — ISODIUM CHLORIDE 3 ML: 0.03 SOLUTION RESPIRATORY (INHALATION) at 23:38

## 2018-01-25 RX ADMIN — METHYLPREDNISOLONE SODIUM SUCCINATE 125 MG: 125 INJECTION, POWDER, FOR SOLUTION INTRAMUSCULAR; INTRAVENOUS at 23:49

## 2018-01-25 RX ADMIN — Medication 3 ML: at 23:38

## 2018-01-25 RX ADMIN — ISODIUM CHLORIDE 3 ML: 0.03 SOLUTION RESPIRATORY (INHALATION) at 23:57

## 2018-01-25 RX ADMIN — RACEPINEPHRINE HYDROCHLORIDE 0.5 ML: 11.25 SOLUTION RESPIRATORY (INHALATION) at 23:38

## 2018-01-26 ENCOUNTER — APPOINTMENT (EMERGENCY)
Dept: RADIOLOGY | Facility: HOSPITAL | Age: 68
DRG: 194 | End: 2018-01-26
Payer: MEDICARE

## 2018-01-26 PROBLEM — J40 BRONCHITIS: Status: ACTIVE | Noted: 2018-01-26

## 2018-01-26 PROBLEM — J18.9 PNEUMONIA DUE TO INFECTIOUS ORGANISM: Status: ACTIVE | Noted: 2018-01-26

## 2018-01-26 PROBLEM — R05.9 COUGH: Status: ACTIVE | Noted: 2018-01-26

## 2018-01-26 LAB
ALBUMIN SERPL BCP-MCNC: 3.8 G/DL (ref 3.5–5)
ALP SERPL-CCNC: 103 U/L (ref 46–116)
ALT SERPL W P-5'-P-CCNC: 59 U/L (ref 12–78)
ANION GAP SERPL CALCULATED.3IONS-SCNC: 11 MMOL/L (ref 4–13)
ANION GAP SERPL CALCULATED.3IONS-SCNC: 13 MMOL/L (ref 4–13)
AST SERPL W P-5'-P-CCNC: 55 U/L (ref 5–45)
ATRIAL RATE: 137 BPM
BILIRUB SERPL-MCNC: 0.6 MG/DL (ref 0.2–1)
BUN SERPL-MCNC: 19 MG/DL (ref 5–25)
BUN SERPL-MCNC: 19 MG/DL (ref 5–25)
CALCIUM SERPL-MCNC: 8.7 MG/DL (ref 8.3–10.1)
CALCIUM SERPL-MCNC: 8.8 MG/DL (ref 8.3–10.1)
CHLORIDE SERPL-SCNC: 104 MMOL/L (ref 100–108)
CHLORIDE SERPL-SCNC: 109 MMOL/L (ref 100–108)
CO2 SERPL-SCNC: 20 MMOL/L (ref 21–32)
CO2 SERPL-SCNC: 26 MMOL/L (ref 21–32)
CREAT SERPL-MCNC: 0.94 MG/DL (ref 0.6–1.3)
CREAT SERPL-MCNC: 1.32 MG/DL (ref 0.6–1.3)
FLUAV AG SPEC QL: DETECTED
FLUBV AG SPEC QL: ABNORMAL
GFR SERPL CREATININE-BSD FRML MDRD: 42 ML/MIN/1.73SQ M
GFR SERPL CREATININE-BSD FRML MDRD: 63 ML/MIN/1.73SQ M
GLUCOSE SERPL-MCNC: 119 MG/DL (ref 65–140)
GLUCOSE SERPL-MCNC: 186 MG/DL (ref 65–140)
L PNEUMO1 AG UR QL IA.RAPID: NEGATIVE
LACTATE SERPL-SCNC: 1.2 MMOL/L (ref 0.5–2)
P AXIS: 30 DEGREES
POTASSIUM SERPL-SCNC: 3.5 MMOL/L (ref 3.5–5.3)
POTASSIUM SERPL-SCNC: 4.3 MMOL/L (ref 3.5–5.3)
PR INTERVAL: 124 MS
PROT SERPL-MCNC: 8.2 G/DL (ref 6.4–8.2)
QRS AXIS: 29 DEGREES
QRSD INTERVAL: 100 MS
QT INTERVAL: 308 MS
QTC INTERVAL: 465 MS
RSV B RNA SPEC QL NAA+PROBE: ABNORMAL
S PNEUM AG UR QL: NEGATIVE
SODIUM SERPL-SCNC: 141 MMOL/L (ref 136–145)
SODIUM SERPL-SCNC: 142 MMOL/L (ref 136–145)
T WAVE AXIS: 47 DEGREES
VENTRICULAR RATE: 137 BPM

## 2018-01-26 PROCEDURE — 99223 1ST HOSP IP/OBS HIGH 75: CPT | Performed by: INTERNAL MEDICINE

## 2018-01-26 PROCEDURE — 36415 COLL VENOUS BLD VENIPUNCTURE: CPT | Performed by: EMERGENCY MEDICINE

## 2018-01-26 PROCEDURE — 96375 TX/PRO/DX INJ NEW DRUG ADDON: CPT

## 2018-01-26 PROCEDURE — 71045 X-RAY EXAM CHEST 1 VIEW: CPT

## 2018-01-26 PROCEDURE — 94760 N-INVAS EAR/PLS OXIMETRY 1: CPT

## 2018-01-26 PROCEDURE — 94640 AIRWAY INHALATION TREATMENT: CPT

## 2018-01-26 PROCEDURE — 80048 BASIC METABOLIC PNL TOTAL CA: CPT | Performed by: HOSPITALIST

## 2018-01-26 PROCEDURE — 87040 BLOOD CULTURE FOR BACTERIA: CPT | Performed by: EMERGENCY MEDICINE

## 2018-01-26 PROCEDURE — 93010 ELECTROCARDIOGRAM REPORT: CPT | Performed by: INTERNAL MEDICINE

## 2018-01-26 PROCEDURE — 99284 EMERGENCY DEPT VISIT MOD MDM: CPT

## 2018-01-26 PROCEDURE — 87449 NOS EACH ORGANISM AG IA: CPT | Performed by: INTERNAL MEDICINE

## 2018-01-26 RX ORDER — ALBUTEROL SULFATE 2.5 MG/3ML
2.5 SOLUTION RESPIRATORY (INHALATION) EVERY 4 HOURS PRN
Status: DISCONTINUED | OUTPATIENT
Start: 2018-01-26 | End: 2018-01-28 | Stop reason: HOSPADM

## 2018-01-26 RX ORDER — MONTELUKAST SODIUM 10 MG/1
10 TABLET ORAL
Status: DISCONTINUED | OUTPATIENT
Start: 2018-01-26 | End: 2018-01-28 | Stop reason: HOSPADM

## 2018-01-26 RX ORDER — ONDANSETRON 2 MG/ML
4 INJECTION INTRAMUSCULAR; INTRAVENOUS ONCE AS NEEDED
Status: DISCONTINUED | OUTPATIENT
Start: 2018-01-26 | End: 2018-01-28 | Stop reason: HOSPADM

## 2018-01-26 RX ORDER — ACETAMINOPHEN 325 MG/1
650 TABLET ORAL ONCE
Status: COMPLETED | OUTPATIENT
Start: 2018-01-26 | End: 2018-01-26

## 2018-01-26 RX ORDER — ONDANSETRON 2 MG/ML
4 INJECTION INTRAMUSCULAR; INTRAVENOUS EVERY 6 HOURS PRN
Status: DISCONTINUED | OUTPATIENT
Start: 2018-01-26 | End: 2018-01-28 | Stop reason: HOSPADM

## 2018-01-26 RX ORDER — LORATADINE 10 MG/1
10 TABLET ORAL DAILY
Status: DISCONTINUED | OUTPATIENT
Start: 2018-01-26 | End: 2018-01-28 | Stop reason: HOSPADM

## 2018-01-26 RX ORDER — HYDROCODONE POLISTIREX AND CHLORPHENIRAMINE POLISTIREX 10; 8 MG/5ML; MG/5ML
5 SUSPENSION, EXTENDED RELEASE ORAL EVERY 12 HOURS PRN
Status: DISCONTINUED | OUTPATIENT
Start: 2018-01-26 | End: 2018-01-28 | Stop reason: HOSPADM

## 2018-01-26 RX ORDER — OSELTAMIVIR PHOSPHATE 30 MG/1
30 CAPSULE ORAL EVERY 12 HOURS SCHEDULED
Status: DISCONTINUED | OUTPATIENT
Start: 2018-01-26 | End: 2018-01-27

## 2018-01-26 RX ORDER — ACETAMINOPHEN 325 MG/1
650 TABLET ORAL EVERY 6 HOURS PRN
Status: DISCONTINUED | OUTPATIENT
Start: 2018-01-26 | End: 2018-01-28 | Stop reason: HOSPADM

## 2018-01-26 RX ORDER — SODIUM CHLORIDE FOR INHALATION 0.9 %
3 VIAL, NEBULIZER (ML) INHALATION EVERY 4 HOURS PRN
Status: DISCONTINUED | OUTPATIENT
Start: 2018-01-26 | End: 2018-01-28 | Stop reason: HOSPADM

## 2018-01-26 RX ORDER — METHYLPREDNISOLONE SODIUM SUCCINATE 40 MG/ML
40 INJECTION, POWDER, LYOPHILIZED, FOR SOLUTION INTRAMUSCULAR; INTRAVENOUS EVERY 6 HOURS SCHEDULED
Status: DISCONTINUED | OUTPATIENT
Start: 2018-01-26 | End: 2018-01-26

## 2018-01-26 RX ORDER — SODIUM CHLORIDE FOR INHALATION 0.9 %
6 VIAL, NEBULIZER (ML) INHALATION ONCE
Status: COMPLETED | OUTPATIENT
Start: 2018-01-26 | End: 2018-01-25

## 2018-01-26 RX ORDER — METHYLPREDNISOLONE SODIUM SUCCINATE 40 MG/ML
40 INJECTION, POWDER, LYOPHILIZED, FOR SOLUTION INTRAMUSCULAR; INTRAVENOUS EVERY 12 HOURS SCHEDULED
Status: DISCONTINUED | OUTPATIENT
Start: 2018-01-26 | End: 2018-01-28 | Stop reason: HOSPADM

## 2018-01-26 RX ORDER — LEVALBUTEROL 1.25 MG/.5ML
SOLUTION, CONCENTRATE RESPIRATORY (INHALATION)
Status: COMPLETED
Start: 2018-01-26 | End: 2018-01-26

## 2018-01-26 RX ORDER — LORAZEPAM 0.5 MG/1
0.5 TABLET ORAL EVERY 6 HOURS PRN
Status: DISCONTINUED | OUTPATIENT
Start: 2018-01-26 | End: 2018-01-28 | Stop reason: HOSPADM

## 2018-01-26 RX ORDER — IPRATROPIUM BROMIDE AND ALBUTEROL SULFATE 2.5; .5 MG/3ML; MG/3ML
3 SOLUTION RESPIRATORY (INHALATION) EVERY 6 HOURS PRN
COMMUNITY
End: 2019-01-29

## 2018-01-26 RX ORDER — OSELTAMIVIR PHOSPHATE 75 MG/1
75 CAPSULE ORAL ONCE
Status: COMPLETED | OUTPATIENT
Start: 2018-01-26 | End: 2018-01-26

## 2018-01-26 RX ORDER — LEVALBUTEROL 1.25 MG/.5ML
1.25 SOLUTION, CONCENTRATE RESPIRATORY (INHALATION)
Status: DISCONTINUED | OUTPATIENT
Start: 2018-01-26 | End: 2018-01-28 | Stop reason: HOSPADM

## 2018-01-26 RX ORDER — PANTOPRAZOLE SODIUM 40 MG/1
40 TABLET, DELAYED RELEASE ORAL
Status: DISCONTINUED | OUTPATIENT
Start: 2018-01-26 | End: 2018-01-28 | Stop reason: HOSPADM

## 2018-01-26 RX ORDER — SODIUM CHLORIDE FOR INHALATION 0.9 %
3 VIAL, NEBULIZER (ML) INHALATION
Status: DISCONTINUED | OUTPATIENT
Start: 2018-01-26 | End: 2018-01-28 | Stop reason: HOSPADM

## 2018-01-26 RX ORDER — SODIUM CHLORIDE 9 MG/ML
125 INJECTION, SOLUTION INTRAVENOUS CONTINUOUS
Status: DISCONTINUED | OUTPATIENT
Start: 2018-01-26 | End: 2018-01-26

## 2018-01-26 RX ORDER — SODIUM CHLORIDE 9 MG/ML
100 INJECTION, SOLUTION INTRAVENOUS CONTINUOUS
Status: DISCONTINUED | OUTPATIENT
Start: 2018-01-26 | End: 2018-01-28

## 2018-01-26 RX ADMIN — LIDOCAINE HYDROCHLORIDE 2.5 ML: 40 INJECTION, SOLUTION RETROBULBAR; TOPICAL at 00:07

## 2018-01-26 RX ADMIN — ACETAMINOPHEN 650 MG: 325 TABLET, FILM COATED ORAL at 00:44

## 2018-01-26 RX ADMIN — ISODIUM CHLORIDE 6 ML: 0.03 SOLUTION RESPIRATORY (INHALATION) at 00:07

## 2018-01-26 RX ADMIN — LEVALBUTEROL 1.25 MG: 1.25 SOLUTION, CONCENTRATE RESPIRATORY (INHALATION) at 15:09

## 2018-01-26 RX ADMIN — ALBUTEROL SULFATE 2.5 MG: 2.5 SOLUTION RESPIRATORY (INHALATION) at 10:00

## 2018-01-26 RX ADMIN — ACETAMINOPHEN 650 MG: 325 TABLET, FILM COATED ORAL at 12:35

## 2018-01-26 RX ADMIN — ACETAMINOPHEN 650 MG: 325 TABLET, FILM COATED ORAL at 23:31

## 2018-01-26 RX ADMIN — ISODIUM CHLORIDE 3 ML: 0.03 SOLUTION RESPIRATORY (INHALATION) at 15:09

## 2018-01-26 RX ADMIN — OSELTAMIVIR PHOSPHATE 30 MG: 30 CAPSULE ORAL at 08:57

## 2018-01-26 RX ADMIN — CEFTRIAXONE 1000 MG: 1 INJECTION, SOLUTION INTRAVENOUS at 00:15

## 2018-01-26 RX ADMIN — SODIUM CHLORIDE 100 ML/HR: 0.9 INJECTION, SOLUTION INTRAVENOUS at 23:34

## 2018-01-26 RX ADMIN — SODIUM CHLORIDE 100 ML/HR: 0.9 INJECTION, SOLUTION INTRAVENOUS at 03:00

## 2018-01-26 RX ADMIN — OSELTAMIVIR PHOSPHATE 75 MG: 75 CAPSULE ORAL at 00:45

## 2018-01-26 RX ADMIN — ALBUTEROL SULFATE 2.5 MG: 2.5 SOLUTION RESPIRATORY (INHALATION) at 03:48

## 2018-01-26 RX ADMIN — MONTELUKAST SODIUM 10 MG: 10 TABLET, FILM COATED ORAL at 21:09

## 2018-01-26 RX ADMIN — LEVALBUTEROL 1.25 MG: 1.25 SOLUTION, CONCENTRATE RESPIRATORY (INHALATION) at 06:51

## 2018-01-26 RX ADMIN — METHYLPREDNISOLONE SODIUM SUCCINATE 40 MG: 40 INJECTION, POWDER, FOR SOLUTION INTRAMUSCULAR; INTRAVENOUS at 12:33

## 2018-01-26 RX ADMIN — ENOXAPARIN SODIUM 40 MG: 40 INJECTION SUBCUTANEOUS at 08:57

## 2018-01-26 RX ADMIN — PANTOPRAZOLE SODIUM 40 MG: 40 TABLET, DELAYED RELEASE ORAL at 05:31

## 2018-01-26 RX ADMIN — AZITHROMYCIN 500 MG: 500 INJECTION, POWDER, LYOPHILIZED, FOR SOLUTION INTRAVENOUS at 00:50

## 2018-01-26 RX ADMIN — METHYLPREDNISOLONE SODIUM SUCCINATE 40 MG: 40 INJECTION, POWDER, FOR SOLUTION INTRAMUSCULAR; INTRAVENOUS at 05:31

## 2018-01-26 RX ADMIN — OSELTAMIVIR PHOSPHATE 30 MG: 30 CAPSULE ORAL at 21:10

## 2018-01-26 RX ADMIN — LORAZEPAM 0.5 MG: 0.5 TABLET ORAL at 21:40

## 2018-01-26 RX ADMIN — SODIUM CHLORIDE 100 ML/HR: 0.9 INJECTION, SOLUTION INTRAVENOUS at 13:50

## 2018-01-26 RX ADMIN — LEVALBUTEROL 1.25 MG: 1.25 SOLUTION, CONCENTRATE RESPIRATORY (INHALATION) at 20:50

## 2018-01-26 RX ADMIN — LORATADINE 10 MG: 10 TABLET ORAL at 08:57

## 2018-01-26 RX ADMIN — HYDROCODONE POLISTIREX AND CHLORPHENIRAMINE POLISTIREX 5 ML: 10; 8 SUSPENSION, EXTENDED RELEASE ORAL at 18:46

## 2018-01-26 RX ADMIN — SODIUM CHLORIDE 1000 ML: 0.9 INJECTION, SOLUTION INTRAVENOUS at 01:45

## 2018-01-26 RX ADMIN — METHYLPREDNISOLONE SODIUM SUCCINATE 40 MG: 40 INJECTION, POWDER, FOR SOLUTION INTRAMUSCULAR; INTRAVENOUS at 21:09

## 2018-01-26 NOTE — ED NOTES
Pt with stridorous breath sounds, gagging/harsh cough, possible laryngospasm, Dr Fifi Rodriguez to bedside        Kym Ramachandran RN  01/26/18 5897

## 2018-01-26 NOTE — PLAN OF CARE
CARDIOVASCULAR - ADULT     Maintains optimal cardiac output and hemodynamic stability Progressing     Absence of cardiac dysrhythmias or at baseline rhythm Progressing        METABOLIC, FLUID AND ELECTROLYTES - ADULT     Electrolytes maintained within normal limits Progressing     Fluid balance maintained Progressing     Glucose maintained within target range Progressing        Potential for Falls     Patient will remain free of falls Progressing        RESPIRATORY - ADULT     Achieves optimal ventilation and oxygenation Progressing

## 2018-01-26 NOTE — PROGRESS NOTES
Pt voided in toilet and flushed specimen before this RN could obtain sample  Hat placed in toilet  Pt educated to void in hat and alert RN

## 2018-01-26 NOTE — ED PROVIDER NOTES
History  Chief Complaint   Patient presents with    Cough     Presents with C/O cough with yellow mucous, congestion, started this afternoon  Took Duoneb at [phone], "made me jittery"  79 yo female with known asthma/COPD who presents with sudden onset today at 1500 of productive cough, chills, mild body aches and SOB  Pt denies chest pain but had some tightness and did a duoneb which made her jittery  's  Of note she was here in feb 2016 for similar event (although she came in earlier this time) and was admitted for 1 week for bronchospasm, bronchitis (which per pt she was later diagnosed with pneumonia) and some laryngospasm  History provided by:  Patient and spouse   used: No    Cough   Cough characteristics:  Productive  Sputum characteristics:  Yellow  Severity:  Moderate  Onset quality:  Sudden  Duration:  8 hours  Timing:  Constant  Progression:  Worsening  Chronicity:  New  Smoker: no    Relieved by:  Nothing  Worsened by:  Nothing  Ineffective treatments:  None tried  Associated symptoms: chills, myalgias, shortness of breath and wheezing    Associated symptoms: no chest pain, no fever, no headaches, no rash, no rhinorrhea and no sore throat    Risk factors: no recent infection and no recent travel        Prior to Admission Medications   Prescriptions Last Dose Informant Patient Reported? Taking? albuterol (PROVENTIL HFA,VENTOLIN HFA) 90 mcg/act inhaler   Yes No   Sig: Inhale 2 puffs every 6 (six) hours as needed for wheezing    loratadine (CLARITIN) 10 mg tablet   Yes No   Sig: Take 10 mg by mouth daily  montelukast (SINGULAIR) 10 mg tablet   Yes No   Sig: Take 10 mg by mouth daily at bedtime  omeprazole (PriLOSEC) 40 MG capsule   Yes No   Sig: Take 40 mg by mouth daily        Facility-Administered Medications: None       Past Medical History:   Diagnosis Date    Asthma     COPD (chronic obstructive pulmonary disease) (HCC)     GERD (gastroesophageal reflux disease)     Laryngeal spasm 2/22/2016    Leukocytosis 2/22/2016       Past Surgical History:   Procedure Laterality Date    CHOLECYSTECTOMY      HYSTERECTOMY      JOINT REPLACEMENT  r hip replacement, fx r shoulder    ORIF HIP FRACTURE Right        Family History   Problem Relation Age of Onset    Pulmonary embolism Daughter      I have reviewed and agree with the history as documented  Social History   Substance Use Topics    Smoking status: Never Smoker    Smokeless tobacco: Never Used    Alcohol use Yes      Comment: occasional        Review of Systems   Constitutional: Positive for chills and fatigue  Negative for appetite change and fever  HENT: Negative for congestion, rhinorrhea, sore throat, trouble swallowing and voice change  Eyes: Negative for visual disturbance  Respiratory: Positive for cough, shortness of breath and wheezing  Cardiovascular: Negative for chest pain and palpitations  Gastrointestinal: Negative for abdominal pain, diarrhea, nausea and vomiting  Genitourinary: Negative for dysuria, frequency, vaginal bleeding and vaginal discharge  Musculoskeletal: Positive for myalgias  Negative for back pain, neck pain and neck stiffness  Skin: Negative for pallor and rash  Allergic/Immunologic: Negative for immunocompromised state  Neurological: Negative for light-headedness and headaches  Psychiatric/Behavioral: Negative for confusion  All other systems reviewed and are negative        Physical Exam  ED Triage Vitals   Temperature Pulse Respirations Blood Pressure SpO2   01/25/18 2305 01/25/18 2304 01/25/18 2305 01/25/18 2304 01/25/18 2304   (!) 97 °F (36 1 °C) (!) 140 22 (!) 175/90 96 %      Temp Source Heart Rate Source Patient Position - Orthostatic VS BP Location FiO2 (%)   01/25/18 2305 01/25/18 2305 01/25/18 2305 01/25/18 2305 --   Temporal Monitor Lying Right arm       Pain Score       01/25/18 2305       3           Orthostatic Vital Signs  Vitals: 01/25/18 2345 01/26/18 0000 01/26/18 0015 01/26/18 0030   BP:  169/70  126/63   Pulse: (!) 116 (!) 115 (!) 109 104   Patient Position - Orthostatic VS:           Physical Exam   Constitutional: She is oriented to person, place, and time  She appears well-developed and well-nourished  No distress  HENT:   Head: Normocephalic and atraumatic  Mouth/Throat: Oropharynx is clear and moist    Frequent harsh cough   Eyes: EOM are normal  Pupils are equal, round, and reactive to light  Neck: Normal range of motion  Neck supple  Cardiovascular: Regular rhythm  Tachycardia present  No murmur heard  Pulmonary/Chest: Effort normal and breath sounds normal  No respiratory distress  Mildly decreased L base   Abdominal: Soft  Bowel sounds are normal  There is no tenderness  Musculoskeletal: Normal range of motion  Neurological: She is alert and oriented to person, place, and time  Skin: Skin is warm  Capillary refill takes less than 2 seconds  No rash noted  No pallor  Psychiatric: She has a normal mood and affect  Her behavior is normal    Nursing note and vitals reviewed        ED Medications  Medications   sodium chloride 0 9 % bolus 1,000 mL (not administered)   azithromycin (ZITHROMAX) 500 mg in sodium chloride 0 9% 250mL IVPB 500 mg (500 mg Intravenous New Bag 1/26/18 0050)   ketorolac (TORADOL) injection 15 mg (15 mg Intravenous Given 1/25/18 2353)   sodium chloride 0 9 % bolus 1,000 mL (1,000 mL Intravenous New Bag 1/25/18 2345)   methylPREDNISolone sodium succinate (Solu-MEDROL) injection 125 mg (125 mg Intravenous Given 1/25/18 2349)   cefTRIAXone (ROCEPHIN) IVPB (premix) 1,000 mg (0 mg Intravenous Stopped 1/26/18 0046)   racepinephrine 2 25 % inhalation solution 0 5 mL (0 5 mL Inhalation Given 1/25/18 2338)   racepinephrine 2 25 % inhalation solution 0 5 mL (0 5 mL Inhalation Given 1/25/18 2357)   sodium chloride 0 9 % inhalation solution **AcuDose Override Pull** (3 mL  Given 1/25/18 2357) lidocaine HCl (PF) (XYLOCAINE) 4 % injection 2 5 mL (2 5 mL Inhalation Given 1/26/18 0007)   sodium chloride 0 9 % inhalation solution **AcuDose Override Pull** (6 mL  Given 1/26/18 0007)   sodium chloride 0 9 % inhalation solution 6 mL (3 mL Nebulization Given 1/25/18 2338)   oseltamivir (TAMIFLU) capsule 75 mg (75 mg Oral Given 1/26/18 0045)   acetaminophen (TYLENOL) tablet 650 mg (650 mg Oral Given 1/26/18 0044)       Diagnostic Studies  Results Reviewed     Procedure Component Value Units Date/Time    Influenza A/B and RSV by PCR (indicated for patients >2 mo of age) [[id number]] Collected:  01/25/18 2340    Lab Status: In process Specimen:  Nasopharyngeal from Nasopharyngeal Swab Updated:  01/26/18 0039    Blood culture #2 [[id number]] Collected:  01/26/18 0005    Lab Status: In process Specimen:  Blood from Line, Venous Updated:  01/26/18 0039    Lactic acid, plasma [[id number]]  (Normal) Collected:  01/25/18 2325    Lab Status:  Final result Specimen:  Blood from Line, Venous Updated:  01/26/18 0006     LACTIC ACID 1 2 mmol/L     Narrative:         Result may be elevated if tourniquet was used during collection      Comprehensive metabolic panel [[id number]]  (Abnormal) Collected:  01/25/18 2325    Lab Status:  Final result Specimen:  Blood from Line, Venous Updated:  01/26/18 0003     Sodium 141 mmol/L      Potassium 4 3 mmol/L      Chloride 104 mmol/L      CO2 26 mmol/L      Anion Gap 11 mmol/L      BUN 19 mg/dL      Creatinine 1 32 (H) mg/dL      Glucose 119 mg/dL      Calcium 8 8 mg/dL      AST 55 (H) U/L      ALT 59 U/L      Alkaline Phosphatase 103 U/L      Total Protein 8 2 g/dL      Albumin 3 8 g/dL      Total Bilirubin 0 60 mg/dL      eGFR 42 ml/min/1 73sq m     Narrative:         National Kidney Disease Education Program recommendations are as follows:  GFR calculation is accurate only with a steady state creatinine  Chronic Kidney disease less than 60 ml/min/1 73 sq  meters  Kidney failure less than 15 ml/min/1 73 sq  meters  CBC and differential [[id number]]  (Abnormal) Collected:  01/25/18 2325    Lab Status:  Final result Specimen:  Blood from Line, Venous Updated:  01/25/18 2348     WBC 10 80 (H) Thousand/uL      RBC 4 46 Million/uL      Hemoglobin 13 8 g/dL      Hematocrit 40 6 %      MCV 91 fL      MCH 30 9 pg      MCHC 34 0 g/dL      RDW 12 8 %      MPV 9 5 fL      Platelets 760 Thousands/uL      Neutrophils Relative 77 (H) %      Lymphocytes Relative 10 (L) %      Monocytes Relative 10 %      Eosinophils Relative 2 %      Basophils Relative 1 %      Neutrophils Absolute 8 39 (H) Thousands/µL      Lymphocytes Absolute 1 06 Thousands/µL      Monocytes Absolute 1 05 Thousand/µL      Eosinophils Absolute 0 25 Thousand/µL      Basophils Absolute 0 05 Thousands/µL     Blood culture #1 [[id number]] Collected:  01/25/18 2325    Lab Status:   In process Specimen:  Blood from Line, Venous Updated:  01/25/18 2347                 XR chest portable    (Results Pending)              Procedures  CriticalCare Time  Performed by: Joanne Fees by: Patricia Edmond, [address] [facility] care provider statement:     Critical care time (minutes):  35    Critical care time was exclusive of:  Separately billable procedures and treating other patients and teaching time    Critical care was necessary to treat or prevent imminent or life-threatening deterioration of the following conditions:  Respiratory failure (laryngospasm)    Critical care was time spent personally by me on the following activities:  Blood draw for specimens, obtaining history from patient or surrogate, development of treatment plan with patient or surrogate, discussions with consultants, evaluation of patient's response to treatment, examination of patient, interpretation of cardiac output measurements, ordering and performing treatments and interventions, ordering and review of laboratory studies, ordering and review of radiographic studies, re-evaluation of patient's condition and review of old charts    I assumed direction of critical care for this patient from another provider in my specialty: no             Phone Contacts  ED Phone Contact    ED Course  ED Course as of Jan 26 0055   Thu Jan 25, 2018   2300 Pt seen and examined  79 yo female with known asthma/COPD who presents with sudden onset today at 1500 of productive cough, chills, mild body aches and SOB  Pt denies chest pain but had some tightness and did a duoneb which made her jittery  's  Of note she was here in feb 2016 for similar event (although she came in earlier this time) and was admitted for 1 week for bronchospasm, bronchitis (which per pt she was later diagnosed with pneumonia) and some laryngospasm  Will check labs including blood cx and lactate, check for flu and obtain chest xray, give IVF, solumedrol, toradol and cover with ceftriaxone and zithromax  2333 Pt had episode of stridor/laryngospasm after coughing fit  Will give racemic epi neb  200 Pt was doing great on racemic epi neb but when it finished needed to be redosed  HR down to 110 - 120's  IVF infusing  Will give lidocaine 4% 2 5ml with 5cc NS inhalation  Will switch to portable chest xray  Fri Jan 26, 2018   0014 Resting comfortably on nebulized lidocaine  0017 Chest xray neg for any acute findings  0023 Temp now 100 3 - will give tylenol and tamiflu  Admitted to [facility]  CritCare Time    Disposition  Final diagnoses:   Tachycardia   Bronchitis   Laryngospasms   Leukocytosis   Cough   Fever   Flu-like symptoms     Time reflects when diagnosis was documented in both MDM as applicable and the Disposition within this note     Time User Action Codes Description Comment    1/26/2018 12:21 AM Jesse POPE Add [R00 0] Tachycardia     1/26/2018 12:21 AM Jesse POPE Add [J40] Bronchitis     1/26/2018 12:22 AM Juan Shell Add [J38 5] Laryngospasms     1/26/2018 12:22 AM Yemi POPE Add [D72 829] Leukocytosis     1/26/2018 12:22 AM Yemi POPE Add [R05] Cough     1/26/2018 12:22 AM Yemi POPE Add [R50 9] Fever     1/26/2018 12:24 AM Lewiston Mettle Add [R68 89] Flu-like symptoms       ED Disposition     ED Disposition Condition Comment    Admit  Case was discussed with Dr Agnieszka Romo and the patient's admission status was agreed to be Admission Status: inpatient status to the service of Dr Agnieszka Romo   Follow-up Information    None       Patient's Medications   Discharge Prescriptions    No medications on file     No discharge procedures on file      ED Provider  Electronically Signed by           Laina Cooley DO  01/26/18 9439

## 2018-01-26 NOTE — CASE MANAGEMENT
Initial Clinical Review    Admission: Date/Time/Statement: 1/26/18 @ 0025     Orders Placed This Encounter   Procedures    Inpatient Admission (expected length of stay for this patient is greater than two midnights)     Standing Status:   Standing     Number of Occurrences:   1     Order Specific Question:   Admitting Physician     Answer:   Zuly Prince [[id number]]     Order Specific Question:   Level of Care     Answer:   Med Surg [16]     Order Specific Question:   Estimated length of stay     Answer:   More than 2 Midnights     Order Specific Question:   Certification     Answer:   I certify that inpatient services are medically necessary for this patient for a duration of greater than two midnights  See H&P and MD Progress Notes for additional information about the patient's course of treatment  ED: Date/Time/Mode of Arrival:   ED Arrival Information     Expected Arrival Acuity Means of Arrival Escorted By Service Admission Type    - 1/25/2018 22:56 Urgent [address] Urgent    Arrival Complaint    sob          Chief Complaint:   Chief Complaint   Patient presents with    Cough     Presents with C/O cough with yellow mucous, congestion, started this afternoon  Took Duoneb at [phone], "made me jittery"  History of Illness: 78 y o  female with history of asthma but only uses inhalers occasionally, GERD, presents with cough which started at 3pm today  Cough productive of yellowish sputum  No fever or chills though noted with temp of 100 3 in the E D  She developed shortness of breath later in the night with associated wheezing  She also has some chest tightness and generalized body aches, rhinorrhea and nasal congestion  She used duonebs at home but this made her jittery  She denies any hemoptysis, no recent travel, no leg swelling, no pnd or orthopnea  No sore throat    She mentioned she has had similar presentation back in 2016 at which time was treated for influenza and pneumonia  In the E D, CXR done was clear but patient developed laryngospasm requiring racemic epinephrine and lidocaine nebulizer treatments  She was tachycardic in 130s but saturating well on room air  She however had a temp of 100 3    ED Vital Signs:   ED Triage Vitals   Temperature Pulse Respirations Blood Pressure SpO2   01/25/18 2305 01/25/18 2304 01/25/18 2305 01/25/18 2304 01/25/18 2304   (!) 97 °F (36 1 °C) (!) 140 22 (!) 175/90 96 %      Temp Source Heart Rate Source Patient Position - Orthostatic VS BP Location FiO2 (%)   01/25/18 2305 01/25/18 2305 01/25/18 2305 01/25/18 2305 --   Temporal Monitor Lying Right arm       Pain Score       01/25/18 2305       3        Wt Readings from Last 1 Encounters:   01/25/18 86 2 kg (190 lb)       Vital Signs (abnormal):  Sustained pulse 140 - 111  Maximum respiratory rate 27  /90  Exam frequent harsh cough  Tachycardia  Mildly decreased breath sounds left base  Abnormal Labs/Diagnostic Test Results:  Bun 19  Creatinine 1 32  Wbc 10 80    ekg - Sinus tachycardia  Otherwise normal ECG  When compared with ECG of 21-FEB-2016 22:22,  Vent   rate has increased BY  45 BPM    ED Treatment:   Medication Administration from 01/25/2018 2256 to 01/26/2018 0134       Date/Time Order Dose Route Action Action by Comments     01/25/2018 2353 ketorolac (TORADOL) injection 15 mg 15 mg Intravenous Given Gustabo Roberts RN      01/26/2018 0119 sodium chloride 0 9 % bolus 1,000 mL 1,000 mL Intravenous Continue to Inpatient Floor Gustabo oRberts RN      01/25/2018 2345 sodium chloride 0 9 % bolus 1,000 mL 1,000 mL Intravenous New Bag Gustabo Roberts RN      01/25/2018 2349 methylPREDNISolone sodium succinate (Solu-MEDROL) injection 125 mg 125 mg Intravenous Given Gustabo Roberts RN      01/26/2018 0046 cefTRIAXone (ROCEPHIN) IVPB (premix) 1,000 mg 0 mg Intravenous Stopped Gustabo Roberts RN      01/26/2018 0015 cefTRIAXone (ROCEPHIN) IVPB (premix) 1,000 mg 1,000 mg Intravenous New Bag Tonna Salines, RN      01/26/2018 0119 azithromycin (ZITHROMAX) 500 mg in sodium chloride 0 9% 250mL IVPB 500 mg 500 mg Intravenous Continue to Inpatient Floor Tonna Salines, RN      01/26/2018 0050 azithromycin (ZITHROMAX) 500 mg in sodium chloride 0 9% 250mL IVPB 500 mg 500 mg Intravenous New Bag Tonna Salines, RN      01/25/2018 2338 racepinephrine 2 25 % inhalation solution 0 5 mL 0 5 mL Inhalation Given Tonna Salines, RN      01/25/2018 2357 racepinephrine 2 25 % inhalation solution 0 5 mL 0 5 mL Inhalation Given Tonna Salines, RN      01/25/2018 2357 sodium chloride 0 9 % inhalation solution **AcuDose Override Pull** 3 mL  Given Tonna Salines, RN      01/26/2018 0007 lidocaine HCl (PF) (XYLOCAINE) 4 % injection 2 5 mL 2 5 mL Inhalation Given Tonna Salines, RN      01/26/2018 0007 sodium chloride 0 9 % inhalation solution **AcuDose Override Pull** 6 mL  Given Tonna Salines, RN      01/25/2018 2338 sodium chloride 0 9 % inhalation solution 6 mL 3 mL Nebulization Given Tonna Salines, RN      01/26/2018 0045 oseltamivir (TAMIFLU) capsule 75 mg 75 mg Oral Given Tonna Salines, RN      01/26/2018 0044 acetaminophen (TYLENOL) tablet 650 mg 650 mg Oral Given Tonna Salines, RN           Past Medical/Surgical History:    Active Ambulatory Problems     Diagnosis Date Noted    GERD (gastroesophageal reflux disease)     COPD [facility] acute exacerbation [facility] [address] )     Asthma     Sepsis ([facility] ) 02/23/2016    Influenza B 02/23/2016    Pneumonia, organism unspecified(486) 02/27/2016     Resolved Ambulatory Problems     Diagnosis Date Noted    Acute viral hepatitis 02/23/2016     Past Medical History:   Diagnosis Date    Asthma     COPD (chronic obstructive pulmonary disease) (HCC)     GERD (gastroesophageal reflux disease)     Laryngeal spasm 2/22/2016    Leukocytosis 2/22/2016       Admitting Diagnosis: Cough [R05]  Laryngospasms [J38 5]  Leukocytosis [D72 829]  Tachycardia [R00 0]  Bronchitis [J40]  Fever [R50 9]  Flu-like symptoms [R68 89]    Age/Sex: 78 y o  female    Assessment/Plan: 78 yr old female with pmhx of GERD, asthma that only requires occasional use of inhalers presenting with acute sob, cough and fever      -Acute purulent bronchitis vs early PNA  - laryngo/bronchospam  -r/o influenza viral infection  -GERD  -RAO with baseline creatinine of 0 8     Plan:  - Though CXR negative at present, this could represent early pna  -will treat her empirically with antibiotics for purulent bronchitis or possible pna  - Start IV steriods with bronchodilators  - oxygen supplementation as needed to keep saturation >90%  - influenza screen sent, will treat her with tamiflu 30mg bid given her current renal function  If negative, will discontinue tamiflu   If possible and renal function improves with hydration, may need to adjust dose of medication  - Start IV fluids for hydration   - F/u renal function  - Avoid nephrotoxic medications  - Follow up on blood cultures sent  - sputum for culture  - Continue her PPI  - DVT prophylaxis with Lovenox       Admission Orders:  Scheduled Meds:   cefTRIAXone 1,000 mg Intravenous Q24H   And      azithromycin 500 mg Intravenous Q24H   enoxaparin 40 mg Subcutaneous Daily   levalbuterol      levalbuterol 1 25 mg Nebulization TID   And      sodium chloride 3 mL Nebulization TID   loratadine 10 mg Oral Daily   methylPREDNISolone sodium succinate 40 mg Intravenous Q6H [address]   montelukast 10 mg Oral HS   oseltamivir 30 mg Oral Q12H ROYAL   pantoprazole 40 mg Oral Early Morning     Continuous Infusions:   sodium chloride 100 mL/hr Last Rate: 100 mL/hr (01/26/18 0700)     PRN Meds:   acetaminophen    albuterol **AND** sodium chloride    ondansetron    ondansetron    OTHER ORDERS:

## 2018-01-26 NOTE — RESPIRATORY THERAPY NOTE
RT Protocol Note  Tan Villanueva 78 y o  female MRN: [id number]  Unit/Bed#: [address] [place] [address] Encounter: [id number]    Assessment    Active Problems:    GERD (gastroesophageal reflux disease)    Asthma    Cough      Home Pulmonary Medications:  Albuterol mdi, Duoneb, Singular  Past Medical History:   Diagnosis Date    Asthma     COPD (chronic obstructive pulmonary disease) (HCC)     GERD (gastroesophageal reflux disease)     Laryngeal spasm 2/22/2016    Leukocytosis 2/22/2016     Social History     Social History    Marital status: /Civil Union     Spouse name: N/A    Number of children: N/A    Years of education: N/A     Social History Main Topics    Smoking status: Never Smoker    Smokeless tobacco: Never Used    Alcohol use Yes      Comment: occasional    Drug use: No    Sexual activity: Not Asked     Other Topics Concern    None     Social History Narrative    None       Subjective         Objective    Physical Exam:   Assessment Type: (P) Pre-treatment  General Appearance: (P) Alert, Awake  Respiratory Pattern: (P) Dyspnea with exertion  Chest Assessment: (P) Chest expansion symmetrical, Trachea midline  Bilateral Breath Sounds: (P) Diminished, Inspiratory wheezes  Cough: (P) Productive, Strong    Vitals:  Blood pressure 123/67, pulse (P) 103, temperature 98 9 °F (37 2 °C), temperature source Oral, resp  rate (P) 22, height 5' 6" (1 676 m), weight 86 2 kg (190 lb), SpO2 (P) 96 %  Imaging and other studies: I have personally reviewed pertinent reports  Plan    Respiratory Plan: (P) Mild Distress pathway  COPD education material given

## 2018-01-26 NOTE — PLAN OF CARE
Problem: Potential for Falls  Goal: Patient will remain free of falls  INTERVENTIONS:  - Assess patient frequently for physical needs  -  Identify cognitive and physical deficits and behaviors that affect risk of falls  -  [place] fall precautions as indicated by assessment   - Educate patient/family on patient safety including physical limitations  - Instruct patient to call for assistance with activity based on assessment  - Modify environment to reduce risk of injury  - Consider OT/PT consult to assist with strengthening/mobility   Outcome: Progressing      Problem: CARDIOVASCULAR - ADULT  Goal: Maintains optimal cardiac output and hemodynamic stability  INTERVENTIONS:  - Monitor I/O, vital signs and rhythm  - Monitor for S/S and trends of decreased cardiac output i e  bleeding, hypotension  - Administer and titrate ordered vasoactive medications to optimize hemodynamic stability  - Assess quality of pulses, skin color and temperature  - Assess for signs of decreased coronary artery perfusion - ex   Angina  - Instruct patient to report change in severity of symptoms   Outcome: Progressing    Goal: Absence of cardiac dysrhythmias or at baseline rhythm  INTERVENTIONS:  - Continuous cardiac monitoring, monitor vital signs, obtain 12 lead EKG if indicated  - Administer antiarrhythmic and heart rate control medications as ordered  - Monitor electrolytes and administer replacement therapy as ordered   Outcome: Progressing      Problem: RESPIRATORY - ADULT  Goal: Achieves optimal ventilation and oxygenation  INTERVENTIONS:  - Assess for changes in respiratory status  - Assess for changes in mentation and behavior  - Position to facilitate oxygenation and minimize respiratory effort  - Oxygen administration by appropriate delivery method based on oxygen saturation (per order) or ABGs  - Initiate smoking cessation education as indicated  - Encourage broncho-pulmonary hygiene including cough, deep breathe, Incentive Spirometry  - Assess the need for suctioning and aspirate as needed  - Assess and instruct to report SOB or any respiratory difficulty  - Respiratory Therapy support as indicated   Outcome: Progressing      Problem: METABOLIC, FLUID AND ELECTROLYTES - ADULT  Goal: Electrolytes maintained within normal limits  INTERVENTIONS:  - Monitor labs and assess patient for signs and symptoms of electrolyte imbalances  - Administer electrolyte replacement as ordered  - Monitor response to electrolyte replacements, including repeat lab results as appropriate  - Instruct patient on fluid and nutrition as appropriate   Outcome: Progressing    Goal: Fluid balance maintained  INTERVENTIONS:  - Monitor labs and assess for signs and symptoms of volume excess or deficit  - Monitor I/O and WT  - Instruct patient on fluid and nutrition as appropriate   Outcome: Progressing    Goal: Glucose maintained within target range  INTERVENTIONS:  - Monitor Blood Glucose as ordered  - Assess for signs and symptoms of hyperglycemia and hypoglycemia  - Administer ordered medications to maintain glucose within target range  - Assess nutritional intake and initiate nutrition service referral as needed   Outcome: Progressing      Problem: DISCHARGE PLANNING - CARE MANAGEMENT  Goal: Discharge to post-acute care or home with appropriate resources  INTERVENTIONS:  - Conduct assessment to determine patient/family and health care team treatment goals, and need for post-acute services based on payer coverage, community resources, and patient preferences, and barriers to discharge  - Address psychosocial, clinical, and financial barriers to discharge as identified in assessment in conjunction with the patient/family and health care team  - Arrange appropriate level of post-acute services according to patient's   needs and preference and payer coverage in collaboration with the physician and health care team  - Communicate with and update the patient/family, physician, and health care team regarding progress on the discharge plan  - Arrange appropriate transportation to post-acute venues   Outcome: Progressing      Problem: PAIN - ADULT  Goal: Verbalizes/displays adequate comfort level or baseline comfort level  Interventions:  - Encourage patient to monitor pain and request assistance  - Assess pain using appropriate pain scale  - Administer analgesics based on type and severity of pain and evaluate response  - Implement non-pharmacological measures as appropriate and evaluate response  - Consider cultural and social influences on pain and pain management  - Notify physician/advanced practitioner if interventions unsuccessful or patient reports new pain  Outcome: Progressing      Problem: INFECTION - ADULT  Goal: Absence or prevention of progression during hospitalization  INTERVENTIONS:  - Assess and monitor for signs and symptoms of infection  - Monitor lab/diagnostic results  - Monitor all insertion sites, i e  indwelling lines, tubes, and drains  - Monitor endotracheal (as able) and nasal secretions for changes in amount and color  - [place] appropriate cooling/warming therapies per order  - Administer medications as ordered  - Instruct and encourage patient and family to use good hand hygiene technique  - Identify and instruct in appropriate isolation precautions for identified infection/condition  Outcome: Progressing      Problem: Knowledge Deficit  Goal: Patient/family/caregiver demonstrates understanding of disease process, treatment plan, medications, and discharge instructions  Complete learning assessment and assess knowledge base    Interventions:  - Provide teaching at level of understanding  - Provide teaching via preferred learning methods  Outcome: Progressing

## 2018-01-26 NOTE — H&P
H&P Exam - [facility] Refsnider 78 y o  female MRN: [id number]    Unit/Bed#: ED 09 Encounter: [id number]        History of Present Illness     HPI:  Funmi Duque is a 78 y o  female with history of asthma but only uses inhalers occasionally, GERD, presents with cough which started at 3pm today  Cough productive of yellowish sputum  No fever or chills though noted with temp of 100 3 in the E D  She developed shortness of breath later in the night with associated wheezing  She also has some chest tightness and generalized body aches, rhinorrhea and nasal congestion  She used duonebs at home but this made her jittery  She denies any hemoptysis, no recent travel, no leg swelling, no pnd or orthopnea  No sore throat  She mentioned she has had similar presentation back in 2016 at which time was treated for influenza and pneumonia  In the E D, CXR done was clear but patient developed laryngospasm requiring racemic epinephrine and lidocaine nebulizer treatments  She was tachycardic in 130s but saturating well on room air   She however had a temp of 100 3        Historical Information   Past Medical History:   Diagnosis Date    Asthma     [facility])     GERD (gastroesophageal reflux disease)     Laryngeal spasm 2/22/2016    Leukocytosis 2/22/2016     Patient Active Problem List   Diagnosis    GERD (gastroesophageal reflux disease)    [facility] exacerbation [facility]    Asthma    [facility]    Influenza B    Pneumonia, organism unspecified(486)    Cough     Past Surgical History:   Procedure Laterality Date    CHOLECYSTECTOMY      HYSTERECTOMY      JOINT REPLACEMENT  r hip replacement, fx r shoulder    ORIF HIP FRACTURE Right        Social History   History   Alcohol Use    Yes     Comment: occasional     History   Drug Use No     History   Smoking Status    Never Smoker   Smokeless Tobacco    Never Used       Family History:   Family History   Problem Relation Age of Onset    Pulmonary embolism Daughter        Meds/Allergies       Current Facility-Administered Medications:     azithromycin (ZITHROMAX) 500 mg in sodium chloride 0 9% 250mL IVPB 500 mg, 500 mg, Intravenous, Once, Fidelina Velarde DO, 500 mg at 01/26/18 0050    sodium chloride 0 9 % bolus 1,000 mL, 1,000 mL, Intravenous, Once, Jeannie Dudley DO    Current Outpatient Prescriptions:     albuterol (PROVENTIL HFA,VENTOLIN HFA) 90 mcg/act inhaler, Inhale 2 puffs every 6 (six) hours as needed for wheezing , Disp: , Rfl:     loratadine (CLARITIN) 10 mg tablet, Take 10 mg by mouth daily  , Disp: , Rfl:     montelukast (SINGULAIR) 10 mg tablet, Take 10 mg by mouth daily at bedtime  , Disp: , Rfl:     omeprazole (PriLOSEC) 40 MG capsule, Take 40 mg by mouth daily  , Disp: , Rfl:     Allergies   Allergen Reactions    Codeine     Erythromycin Base     Tetracyclines & Related        Review of Systems   Constitutional: Positive for fever  Negative for activity change, appetite change, chills, diaphoresis and fatigue  HENT: Positive for congestion and rhinorrhea  Negative for drooling, ear discharge, ear pain, hearing loss, nosebleeds, postnasal drip, sore throat, tinnitus, trouble swallowing and voice change  Eyes: Negative for pain, discharge and redness  Respiratory: Positive for cough, chest tightness, shortness of breath, wheezing and stridor  Negative for apnea  Cardiovascular: Negative for chest pain, palpitations and leg swelling  Gastrointestinal: Negative for abdominal distention, abdominal pain, anal bleeding, blood in stool and constipation  Endocrine: Negative for cold intolerance, heat intolerance, polydipsia, polyphagia and polyuria  Genitourinary: Negative for difficulty urinating, dysuria, flank pain, hematuria and urgency  Musculoskeletal: Positive for back pain, myalgias and neck pain  Negative for arthralgias  Skin: Negative for color change and pallor  Objective   Vitals: Blood pressure 128/66, pulse (!) 111, temperature 100 3 °F (37 9 °C), temperature source Temporal, resp  rate 17, height 5' 7" (1 702 m), weight 86 2 kg (190 lb), SpO2 95 %  Physical Exam   General- awake and alert, oriented X3, mild tachypneic,   HEENT: PERRLA, EOMI, sclera anicteric, moist mucous membranes, tongue mucosa without lesions  No pharyngeal erythema or tonsillar exudates  No oral thrush  Neck: supple, no JVD, lymphadenopathy, thyromegaly  Heart: Regular rhythm but tachycardic, S1S2 present  No murmur, rub or gallop  Lungs; Bilaterally wheezing  No crackles  No accessory muscle use or respiratory distress  Abdomen: soft, non-tender, non-distended, NABS  No guarding or rebound  No peritoneal sound or mass  Extremities: no clubbing, cyanosis, or edema  2+ pedal pulses bilaterally  Full range of motion  Neurologic:  Cranial nerves II-XII intact  Strength and sensation globally intact  Speech fluent and goal directed  Awake, alert and oriented x 3  Skin: warm and dry  No petechiae, purpura or rash  Lab Results:     Results from last 7 days  Lab Units 01/25/18  2325   WBC Thousand/uL 10 80*   HEMOGLOBIN g/dL 13 8   HEMATOCRIT % 40 6   PLATELETS Thousands/uL 315       Results from last 7 days  Lab Units 01/25/18  2325   SODIUM mmol/L 141   POTASSIUM mmol/L 4 3   CHLORIDE mmol/L 104   CO2 mmol/L 26   BUN mg/dL 19   CREATININE mg/dL 1 32*   GLUCOSE RANDOM mg/dL 119   CALCIUM mg/dL 8 8         Imaging:  XR chest portable    (Results Pending)   Chest xray reviewed by me, showed on acute process      EKG, Pathology, and Other Studies:  Sinus tachycardia with no acute st-t changes    Assessment/Plan     Assessment:  78 yr old female with pmhx of GERD, asthma that only requires occasional use of inhalers presenting with acute sob, cough and fever     -Acute purulent bronchitis vs early PNA  - laryngo/bronchospam  -r/o influenza viral infection  -GERD  -RAO with baseline creatinine of 0 8    Plan:  - Though CXR negative at present, this could represent early pna  -will treat her empirically with antibiotics for purulent bronchitis or possible pna  - Start IV steriods with bronchodilators  - oxygen supplementation as needed to keep saturation >90%  - influenza screen sent, will treat her with tamiflu 30mg bid given her current renal function  If negative, will discontinue tamiflu  If possible and renal function improves with hydration, may need to adjust dose of medication  - Start IV fluids for hydration   - F/u renal function  - Avoid nephrotoxic medications  - Follow up on blood cultures sent  - sputum for culture  - Continue her PPI  - DVT prophylaxis with Lovenox    Code Status: Full    Counseling / Coordination of Care  Total floor / unit time spent today 60 minutes  Greater than 50% of total time was spent with the patient and / or family counseling and / or coordination of care  Expected duration of stay at least 2 midnights    Portions of the record may have been created with voice recognition software  Occasional wrong word or "sound a like" substitutions may have occurred due to the inherent limitations of voice recognition software  Read the chart carefully and recognize, using context, where substitutions have occurred

## 2018-01-27 LAB
ALBUMIN SERPL BCP-MCNC: 3.2 G/DL (ref 3.5–5)
ALP SERPL-CCNC: 88 U/L (ref 46–116)
ALT SERPL W P-5'-P-CCNC: 167 U/L (ref 12–78)
ANION GAP SERPL CALCULATED.3IONS-SCNC: 9 MMOL/L (ref 4–13)
AST SERPL W P-5'-P-CCNC: 111 U/L (ref 5–45)
BILIRUB SERPL-MCNC: 0.3 MG/DL (ref 0.2–1)
BUN SERPL-MCNC: 22 MG/DL (ref 5–25)
CALCIUM SERPL-MCNC: 8.2 MG/DL (ref 8.3–10.1)
CHLORIDE SERPL-SCNC: 109 MMOL/L (ref 100–108)
CO2 SERPL-SCNC: 23 MMOL/L (ref 21–32)
CREAT SERPL-MCNC: 0.77 MG/DL (ref 0.6–1.3)
ERYTHROCYTE [DISTWIDTH] IN BLOOD BY AUTOMATED COUNT: 13.4 % (ref 11.6–15.1)
GFR SERPL CREATININE-BSD FRML MDRD: 81 ML/MIN/1.73SQ M
GLUCOSE SERPL-MCNC: 150 MG/DL (ref 65–140)
HCT VFR BLD AUTO: 35.5 % (ref 34.8–46.1)
HGB BLD-MCNC: 11.5 G/DL (ref 11.5–15.4)
MCH RBC QN AUTO: 30 PG (ref 26.8–34.3)
MCHC RBC AUTO-ENTMCNC: 32.4 G/DL (ref 31.4–37.4)
MCV RBC AUTO: 93 FL (ref 82–98)
PLATELET # BLD AUTO: 310 THOUSANDS/UL (ref 149–390)
PMV BLD AUTO: 9.5 FL (ref 8.9–12.7)
POTASSIUM SERPL-SCNC: 4.3 MMOL/L (ref 3.5–5.3)
PROT SERPL-MCNC: 6.6 G/DL (ref 6.4–8.2)
RBC # BLD AUTO: 3.83 MILLION/UL (ref 3.81–5.12)
SODIUM SERPL-SCNC: 141 MMOL/L (ref 136–145)
WBC # BLD AUTO: 20.01 THOUSAND/UL (ref 4.31–10.16)

## 2018-01-27 PROCEDURE — 80053 COMPREHEN METABOLIC PANEL: CPT | Performed by: INTERNAL MEDICINE

## 2018-01-27 PROCEDURE — 99232 SBSQ HOSP IP/OBS MODERATE 35: CPT | Performed by: HOSPITALIST

## 2018-01-27 PROCEDURE — 85027 COMPLETE CBC AUTOMATED: CPT | Performed by: INTERNAL MEDICINE

## 2018-01-27 PROCEDURE — 94640 AIRWAY INHALATION TREATMENT: CPT

## 2018-01-27 PROCEDURE — 94760 N-INVAS EAR/PLS OXIMETRY 1: CPT

## 2018-01-27 RX ORDER — OSELTAMIVIR PHOSPHATE 75 MG/1
75 CAPSULE ORAL EVERY 12 HOURS SCHEDULED
Status: DISCONTINUED | OUTPATIENT
Start: 2018-01-27 | End: 2018-01-28 | Stop reason: HOSPADM

## 2018-01-27 RX ADMIN — HYDROCODONE POLISTIREX AND CHLORPHENIRAMINE POLISTIREX 5 ML: 10; 8 SUSPENSION, EXTENDED RELEASE ORAL at 22:51

## 2018-01-27 RX ADMIN — METHYLPREDNISOLONE SODIUM SUCCINATE 40 MG: 40 INJECTION, POWDER, FOR SOLUTION INTRAMUSCULAR; INTRAVENOUS at 21:27

## 2018-01-27 RX ADMIN — LEVALBUTEROL 1.25 MG: 1.25 SOLUTION, CONCENTRATE RESPIRATORY (INHALATION) at 15:24

## 2018-01-27 RX ADMIN — OSELTAMIVIR PHOSPHATE 75 MG: 75 CAPSULE ORAL at 21:27

## 2018-01-27 RX ADMIN — ISODIUM CHLORIDE 3 ML: 0.03 SOLUTION RESPIRATORY (INHALATION) at 09:10

## 2018-01-27 RX ADMIN — METHYLPREDNISOLONE SODIUM SUCCINATE 40 MG: 40 INJECTION, POWDER, FOR SOLUTION INTRAMUSCULAR; INTRAVENOUS at 08:03

## 2018-01-27 RX ADMIN — LORATADINE 10 MG: 10 TABLET ORAL at 08:03

## 2018-01-27 RX ADMIN — ENOXAPARIN SODIUM 40 MG: 40 INJECTION SUBCUTANEOUS at 08:03

## 2018-01-27 RX ADMIN — OSELTAMIVIR PHOSPHATE 30 MG: 30 CAPSULE ORAL at 08:03

## 2018-01-27 RX ADMIN — MONTELUKAST SODIUM 10 MG: 10 TABLET, FILM COATED ORAL at 21:28

## 2018-01-27 RX ADMIN — PANTOPRAZOLE SODIUM 40 MG: 40 TABLET, DELAYED RELEASE ORAL at 05:59

## 2018-01-27 RX ADMIN — ACETAMINOPHEN 650 MG: 325 TABLET, FILM COATED ORAL at 22:51

## 2018-01-27 RX ADMIN — ISODIUM CHLORIDE 3 ML: 0.03 SOLUTION RESPIRATORY (INHALATION) at 15:24

## 2018-01-27 RX ADMIN — ISODIUM CHLORIDE 3 ML: 0.03 SOLUTION RESPIRATORY (INHALATION) at 20:41

## 2018-01-27 RX ADMIN — ACETAMINOPHEN 650 MG: 325 TABLET, FILM COATED ORAL at 15:54

## 2018-01-27 RX ADMIN — LEVALBUTEROL 1.25 MG: 1.25 SOLUTION, CONCENTRATE RESPIRATORY (INHALATION) at 20:40

## 2018-01-27 RX ADMIN — HYDROCODONE POLISTIREX AND CHLORPHENIRAMINE POLISTIREX 5 ML: 10; 8 SUSPENSION, EXTENDED RELEASE ORAL at 08:04

## 2018-01-27 RX ADMIN — ACETAMINOPHEN 650 MG: 325 TABLET, FILM COATED ORAL at 08:04

## 2018-01-27 RX ADMIN — LEVALBUTEROL 1.25 MG: 1.25 SOLUTION, CONCENTRATE RESPIRATORY (INHALATION) at 09:10

## 2018-01-27 NOTE — PLAN OF CARE
Problem: Potential for Falls  Goal: Patient will remain free of falls  INTERVENTIONS:  - Assess patient frequently for physical needs  -  Identify cognitive and physical deficits and behaviors that affect risk of falls  -  [place] fall precautions as indicated by assessment   - Educate patient/family on patient safety including physical limitations  - Instruct patient to call for assistance with activity based on assessment  - Modify environment to reduce risk of injury  - Consider OT/PT consult to assist with strengthening/mobility   Outcome: Progressing      Problem: CARDIOVASCULAR - ADULT  Goal: Maintains optimal cardiac output and hemodynamic stability  INTERVENTIONS:  - Monitor I/O, vital signs and rhythm  - Monitor for S/S and trends of decreased cardiac output i e  bleeding, hypotension  - Administer and titrate ordered vasoactive medications to optimize hemodynamic stability  - Assess quality of pulses, skin color and temperature  - Assess for signs of decreased coronary artery perfusion - ex   Angina  - Instruct patient to report change in severity of symptoms   Outcome: Progressing    Goal: Absence of cardiac dysrhythmias or at baseline rhythm  INTERVENTIONS:  - Continuous cardiac monitoring, monitor vital signs, obtain 12 lead EKG if indicated  - Administer antiarrhythmic and heart rate control medications as ordered  - Monitor electrolytes and administer replacement therapy as ordered   Outcome: Progressing      Problem: RESPIRATORY - ADULT  Goal: Achieves optimal ventilation and oxygenation  INTERVENTIONS:  - Assess for changes in respiratory status  - Assess for changes in mentation and behavior  - Position to facilitate oxygenation and minimize respiratory effort  - Oxygen administration by appropriate delivery method based on oxygen saturation (per order) or ABGs  - Initiate smoking cessation education as indicated  - Encourage broncho-pulmonary hygiene including cough, deep breathe, Incentive Spirometry  - Assess the need for suctioning and aspirate as needed  - Assess and instruct to report SOB or any respiratory difficulty  - Respiratory Therapy support as indicated   Outcome: Progressing      Problem: METABOLIC, FLUID AND ELECTROLYTES - ADULT  Goal: Electrolytes maintained within normal limits  INTERVENTIONS:  - Monitor labs and assess patient for signs and symptoms of electrolyte imbalances  - Administer electrolyte replacement as ordered  - Monitor response to electrolyte replacements, including repeat lab results as appropriate  - Instruct patient on fluid and nutrition as appropriate   Outcome: Progressing    Goal: Fluid balance maintained  INTERVENTIONS:  - Monitor labs and assess for signs and symptoms of volume excess or deficit  - Monitor I/O and WT  - Instruct patient on fluid and nutrition as appropriate   Outcome: Progressing    Goal: Glucose maintained within target range  INTERVENTIONS:  - Monitor Blood Glucose as ordered  - Assess for signs and symptoms of hyperglycemia and hypoglycemia  - Administer ordered medications to maintain glucose within target range  - Assess nutritional intake and initiate nutrition service referral as needed   Outcome: Progressing      Problem: PAIN - ADULT  Goal: Verbalizes/displays adequate comfort level or baseline comfort level  Interventions:  - Encourage patient to monitor pain and request assistance  - Assess pain using appropriate pain scale  - Administer analgesics based on type and severity of pain and evaluate response  - Implement non-pharmacological measures as appropriate and evaluate response  - Consider cultural and social influences on pain and pain management  - Notify physician/advanced practitioner if interventions unsuccessful or patient reports new pain   Outcome: Progressing      Problem: INFECTION - ADULT  Goal: Absence or prevention of progression during hospitalization  INTERVENTIONS:  - Assess and monitor for signs and symptoms of infection  - Monitor lab/diagnostic results  - Monitor all insertion sites, i e  indwelling lines, tubes, and drains  - Monitor endotracheal (as able) and nasal secretions for changes in amount and color  - [place] appropriate cooling/warming therapies per order  - Administer medications as ordered  - Instruct and encourage patient and family to use good hand hygiene technique  - Identify and instruct in appropriate isolation precautions for identified infection/condition   Outcome: Progressing      Problem: Knowledge Deficit  Goal: Patient/family/caregiver demonstrates understanding of disease process, treatment plan, medications, and discharge instructions  Complete learning assessment and assess knowledge base  Interventions:  - Provide teaching at level of understanding  - Provide teaching via preferred learning methods   Outcome: Progressing      Problem: SAFETY ADULT  Goal: Patient will remain free of falls  INTERVENTIONS:  - Assess patient frequently for physical needs  -  Identify cognitive and physical deficits and behaviors that affect risk of falls    -  [place] fall precautions as indicated by assessment   - Educate patient/family on patient safety including physical limitations  - Instruct patient to call for assistance with activity based on assessment  - Modify environment to reduce risk of injury  - Consider OT/PT consult to assist with strengthening/mobility   Outcome: Progressing      Problem: DISCHARGE PLANNING  Goal: Discharge to home or other facility with appropriate resources  INTERVENTIONS:  - Identify barriers to discharge w/patient and caregiver  - Arrange for needed discharge resources and transportation as appropriate  - Identify discharge learning needs (meds, wound care, etc )  - Arrange for interpretive services to assist at discharge as needed  - Refer to Case Management Department for coordinating discharge planning if the patient needs post-hospital services based on physician/advanced practitioner order or complex needs related to functional status, cognitive ability, or social support system  Outcome: Progressing      Problem: SKIN/TISSUE INTEGRITY - ADULT  Goal: Skin integrity remains intact  INTERVENTIONS  - Identify patients at risk for skin breakdown  - Assess and monitor skin integrity  - Assess and monitor nutrition and hydration status  - Monitor labs (i e  albumin)  - Assess for incontinence   - Turn and reposition patient  - Assist with mobility/ambulation  - Relieve pressure over bony prominences  - Avoid friction and shearing  - Provide appropriate hygiene as needed including keeping skin clean and dry  - Evaluate need for skin moisturizer/barrier cream  - Collaborate with interdisciplinary team (i e  Nutrition, Rehabilitation, etc )   - Patient/family teaching  Outcome: Progressing      Problem: MUSCULOSKELETAL - ADULT  Goal: Maintain or return mobility to safest level of function  INTERVENTIONS:  - Assess patient's ability to carry out ADLs; assess patient's baseline for ADL function and identify physical deficits which impact ability to perform ADLs (bathing, care of mouth/teeth, toileting, grooming, dressing, etc )  - Assess/evaluate cause of self-care deficits   - Assess range of motion  - Assess patient's mobility; develop plan if impaired  - Assess patient's need for assistive devices and provide as appropriate  - Encourage maximum independence but intervene and supervise when necessary  - Involve family in performance of ADLs  - Assess for home care needs following discharge   - Request OT consult to assist with ADL evaluation and planning for discharge  - Provide patient education as appropriate  Outcome: Progressing

## 2018-01-27 NOTE — PROGRESS NOTES
Progress note      Alan Villanueva 78 y o  female MRN: [id number]                 History of Present Illness        HPI:  Eris De Leon is a 78 y o  female with history of asthma but only uses inhalers occasionally, GERD, presents with cough which started at yesterday  Cough productive of yellowish sputum  No fever or chills though noted with temp of 100 3 in the E D  She developed shortness of breath later in the night with associated wheezing  She also has some chest tightness and generalized body aches, rhinorrhea and nasal congestion  She used duonebs at home but this made her jittery  She denies any hemoptysis, no recent travel, no leg swelling, no pnd or orthopnea  No sore throat  She mentioned she has had similar presentation back in 2016 at which time was treated for influenza and pneumonia  In the E D, CXR done was clear but patient developed laryngospasm requiring racemic epinephrine and lidocaine nebulizer treatments  She was tachycardic in 130s but saturating well on room air   She however had a temp of 100 3              Historical Information     Medical History        Past Medical History:   Diagnosis Date    Asthma      [facility]      GERD (gastroesophageal reflux disease)      Laryngeal spasm 2/22/2016    Leukocytosis 2/22/2016         Patient Active Problem List   Diagnosis    GERD (gastroesophageal reflux disease)    COPD with acute exacerbation ([facility] [address]    Asthma    [facility]    Influenza B    Pneumonia, organism unspecified(486)    Cough      Surgical History         Past Surgical History:   Procedure Laterality Date    CHOLECYSTECTOMY        HYSTERECTOMY        JOINT REPLACEMENT   r hip replacement, fx r shoulder    ORIF HIP FRACTURE Right                 Social History          History   Alcohol Use    Yes       Comment: occasional          History   Drug Use No          History   Smoking Status    Never Smoker   Smokeless Tobacco    Never Used         Family History:         Family History   Problem Relation Age of Onset    Pulmonary embolism Daughter              Meds/Allergies           Current Facility-Administered Medications:     azithromycin (ZITHROMAX) 500 mg in sodium chloride 0 9% 250mL IVPB 500 mg, 500 mg, Intravenous, Once, Jeniffer Bartolo, , 500 mg at 01/26/18 0050    sodium chloride 0 9 % bolus 1,000 mL, 1,000 mL, Intravenous, Once, Fidelina Velarde DO  tamiflu     Current Outpatient Prescriptions:     albuterol (PROVENTIL HFA,VENTOLIN HFA) 90 mcg/act inhaler, Inhale 2 puffs every 6 (six) hours as needed for wheezing , Disp: , Rfl:     loratadine (CLARITIN) 10 mg tablet, Take 10 mg by mouth daily  , Disp: , Rfl:     montelukast (SINGULAIR) 10 mg tablet, Take 10 mg by mouth daily at bedtime  , Disp: , Rfl:     omeprazole (PriLOSEC) 40 MG capsule, Take 40 mg by mouth daily  , Disp: , Rfl:           Allergies   Allergen Reactions    Codeine      Erythromycin Base      Tetracyclines & Related           Review of Systems   Constitutional: Positive for fever  Negative for activity change, appetite change, chills, diaphoresis and fatigue  HENT: Positive for congestion and rhinorrhea  Negative for drooling, ear discharge, ear pain, hearing loss, nosebleeds, postnasal drip, sore throat, tinnitus, trouble swallowing and voice change  Eyes: Negative for pain, discharge and redness  Respiratory: Positive for cough, chest tightness, shortness of breath, wheezing and stridor  Negative for apnea  Cardiovascular: Negative for chest pain, palpitations and leg swelling  Gastrointestinal: Negative for abdominal distention, abdominal pain, anal bleeding, blood in stool and constipation  Endocrine: Negative for cold intolerance, heat intolerance, polydipsia, polyphagia and polyuria  Genitourinary: Negative for difficulty urinating, dysuria, flank pain, hematuria and urgency     Musculoskeletal: Positive for back pain, myalgias and neck pain  Negative for arthralgias  Skin: Negative for color change and pallor  Objective      Vitals: Blood pressure 128/66, pulse (!) 111, temperature 100 3 °F (37 9 °C), temperature source Temporal, resp  rate 17, height 5' 7" (1 702 m), weight 86 2 kg (190 lb), SpO2 95 %  Physical Exam   General- awake and alert, oriented X3, mild tachypneic,   HEENT: PERRLA, EOMI, sclera anicteric, moist mucous membranes, tongue mucosa without lesions  No pharyngeal erythema or tonsillar exudates  No oral thrush  Neck: supple, no JVD, lymphadenopathy, thyromegaly  Heart: Regular rhythm but tachycardic, S1S2 present  No murmur, rub or gallop  Lungs; Bilaterally wheezing  No crackles  No accessory muscle use or respiratory distress  Abdomen: soft, non-tender, non-distended, NABS  No guarding or rebound  No peritoneal sound or mass  Extremities: no clubbing, cyanosis, or edema  2+ pedal pulses bilaterally  Full range of motion  Neurologic:  Cranial nerves II-XII intact  Strength and sensation globally intact  Speech fluent and goal directed  Awake, alert and oriented x 3  Skin: warm and dry  No petechiae, purpura or rash  Lab Results:      Results from last 7 days  Lab Units 01/25/18  2325   WBC Thousand/uL 10 80*   HEMOGLOBIN g/dL 13 8   HEMATOCRIT % 40 6   PLATELETS Thousands/uL 315         Results from last 7 days  Lab Units 01/25/18  2325   SODIUM mmol/L 141   POTASSIUM mmol/L 4 3   CHLORIDE mmol/L 104   CO2 mmol/L 26   BUN mg/dL 19   CREATININE mg/dL 1 32*   GLUCOSE RANDOM mg/dL 119   CALCIUM mg/dL 8 8        positive influ a     Imaging:  XR chest portable    (Results Pending)   Chest xray reviewed by me, showed on acute process       EKG, Pathology, and Other Studies:  Sinus tachycardia with no acute st-t changes        Assessment/Plan         Assessment:  78 yr old female with pmhx of GERD, asthma that only requires occasional use of inhalers presenting with acute sob, cough and fever      - found to have influenza A, c/w tamiflu , c/w monitoring , possible discharge in [facility] am  - laryngo/bronchospam  -GERD  -RAO with baseline creatinine of 0 8, iv hydration     Plan:  - d/c in am , c/w tamiflu   - c/w  IV steriods with bronchodilators  - oxygen supplementation as needed to keep saturation >90%  - Start IV fluids for hydration   - F/u renal function  - Avoid nephrotoxic medications  - Follow up on blood cultures sent  - sputum for culture  - Continue her PPI  - DVT prophylaxis with Lovenox     Code Status: Full     Counseling / Coordination of Care  Total floor  Time 30 mins

## 2018-01-28 VITALS
RESPIRATION RATE: 20 BRPM | BODY MASS INDEX: 30.53 KG/M2 | TEMPERATURE: 98.1 F | SYSTOLIC BLOOD PRESSURE: 142 MMHG | OXYGEN SATURATION: 94 % | HEIGHT: 66 IN | WEIGHT: 190 LBS | HEART RATE: 80 BPM | DIASTOLIC BLOOD PRESSURE: 78 MMHG

## 2018-01-28 LAB
ANION GAP SERPL CALCULATED.3IONS-SCNC: 7 MMOL/L (ref 4–13)
BUN SERPL-MCNC: 26 MG/DL (ref 5–25)
CALCIUM SERPL-MCNC: 8.3 MG/DL (ref 8.3–10.1)
CHLORIDE SERPL-SCNC: 106 MMOL/L (ref 100–108)
CO2 SERPL-SCNC: 26 MMOL/L (ref 21–32)
CREAT SERPL-MCNC: 0.73 MG/DL (ref 0.6–1.3)
ERYTHROCYTE [DISTWIDTH] IN BLOOD BY AUTOMATED COUNT: 13.6 % (ref 11.6–15.1)
GFR SERPL CREATININE-BSD FRML MDRD: 86 ML/MIN/1.73SQ M
GLUCOSE SERPL-MCNC: 134 MG/DL (ref 65–140)
HCT VFR BLD AUTO: 35.2 % (ref 34.8–46.1)
HGB BLD-MCNC: 11.8 G/DL (ref 11.5–15.4)
MCH RBC QN AUTO: 31.4 PG (ref 26.8–34.3)
MCHC RBC AUTO-ENTMCNC: 33.5 G/DL (ref 31.4–37.4)
MCV RBC AUTO: 94 FL (ref 82–98)
PLATELET # BLD AUTO: 296 THOUSANDS/UL (ref 149–390)
PMV BLD AUTO: 9.8 FL (ref 8.9–12.7)
POTASSIUM SERPL-SCNC: 4.5 MMOL/L (ref 3.5–5.3)
RBC # BLD AUTO: 3.76 MILLION/UL (ref 3.81–5.12)
SODIUM SERPL-SCNC: 139 MMOL/L (ref 136–145)
WBC # BLD AUTO: 18 THOUSAND/UL (ref 4.31–10.16)

## 2018-01-28 PROCEDURE — 85027 COMPLETE CBC AUTOMATED: CPT | Performed by: HOSPITALIST

## 2018-01-28 PROCEDURE — 94640 AIRWAY INHALATION TREATMENT: CPT

## 2018-01-28 PROCEDURE — 94760 N-INVAS EAR/PLS OXIMETRY 1: CPT

## 2018-01-28 PROCEDURE — 80048 BASIC METABOLIC PNL TOTAL CA: CPT | Performed by: HOSPITALIST

## 2018-01-28 PROCEDURE — 87070 CULTURE OTHR SPECIMN AEROBIC: CPT | Performed by: HOSPITALIST

## 2018-01-28 PROCEDURE — 99239 HOSP IP/OBS DSCHRG MGMT >30: CPT | Performed by: HOSPITALIST

## 2018-01-28 PROCEDURE — 87205 SMEAR GRAM STAIN: CPT | Performed by: HOSPITALIST

## 2018-01-28 RX ORDER — HYDROCODONE POLISTIREX AND CHLORPHENIRAMINE POLISTIREX 10; 8 MG/5ML; MG/5ML
5 SUSPENSION, EXTENDED RELEASE ORAL EVERY 12 HOURS PRN
Qty: 120 ML | Refills: 0 | Status: SHIPPED | OUTPATIENT
Start: 2018-01-28 | End: 2018-02-07

## 2018-01-28 RX ORDER — OSELTAMIVIR PHOSPHATE 75 MG/1
75 CAPSULE ORAL EVERY 12 HOURS SCHEDULED
Qty: 8 CAPSULE | Refills: 0 | Status: SHIPPED | OUTPATIENT
Start: 2018-01-28 | End: 2018-02-01

## 2018-01-28 RX ORDER — PREDNISONE 10 MG/1
10 TABLET ORAL DAILY
Qty: 2 TABLET | Refills: 0 | Status: SHIPPED | OUTPATIENT
Start: 2018-01-31 | End: 2019-04-29 | Stop reason: SDUPTHER

## 2018-01-28 RX ORDER — PREDNISONE 10 MG/1
20 TABLET ORAL DAILY
Qty: 2 TABLET | Refills: 0 | Status: SHIPPED | OUTPATIENT
Start: 2018-01-28 | End: 2018-01-30

## 2018-01-28 RX ORDER — PREDNISONE 1 MG/1
5 TABLET ORAL DAILY
Qty: 2 TABLET | Refills: 0 | Status: SHIPPED | OUTPATIENT
Start: 2018-02-03 | End: 2018-02-05

## 2018-01-28 RX ORDER — LORAZEPAM 0.5 MG/1
TABLET ORAL
Status: COMPLETED
Start: 2018-01-28 | End: 2018-01-28

## 2018-01-28 RX ADMIN — OSELTAMIVIR PHOSPHATE 75 MG: 75 CAPSULE ORAL at 08:03

## 2018-01-28 RX ADMIN — METHYLPREDNISOLONE SODIUM SUCCINATE 40 MG: 40 INJECTION, POWDER, FOR SOLUTION INTRAMUSCULAR; INTRAVENOUS at 08:04

## 2018-01-28 RX ADMIN — LORAZEPAM 0.5 MG: 0.5 TABLET ORAL at 03:30

## 2018-01-28 RX ADMIN — PANTOPRAZOLE SODIUM 40 MG: 40 TABLET, DELAYED RELEASE ORAL at 05:50

## 2018-01-28 RX ADMIN — LEVALBUTEROL 1.25 MG: 1.25 SOLUTION, CONCENTRATE RESPIRATORY (INHALATION) at 08:23

## 2018-01-28 RX ADMIN — ENOXAPARIN SODIUM 40 MG: 40 INJECTION SUBCUTANEOUS at 08:04

## 2018-01-28 RX ADMIN — LORATADINE 10 MG: 10 TABLET ORAL at 08:04

## 2018-01-28 RX ADMIN — ACETAMINOPHEN 650 MG: 325 TABLET, FILM COATED ORAL at 08:04

## 2018-01-28 RX ADMIN — ISODIUM CHLORIDE 3 ML: 0.03 SOLUTION RESPIRATORY (INHALATION) at 08:23

## 2018-01-28 NOTE — PROGRESS NOTES
Progress Note - Marijo Bernheim Refsniguy 78 y o  female MRN: [id number]    Unit/Bed#: [address] [place] [address] Encounter: [id number]      Assessment:  binh SOSA    Plan:  Assessment:  78 yr old female with pmhx of GERD, asthma that only requires occasional use of inhalers presenting with acute sob, cough and fever      - found to have influenza A, c/w tamiflu , c/w monitoring , discharge  today  - laryngo/bronchospam  -GERD  -RAO with baseline creatinine of 0 8, iv hydration     Plan:  - d/c  today  , c/w tamiflu   - c/w  po prednisone with bronchodilators  - oxygen supplementation as needed to keep saturation >90%  - Continue her PPI      Subjective:   comfortable    Objective:     Vitals: Blood pressure (!) 184/88, pulse 80, temperature 98 1 °F (36 7 °C), temperature source Oral, resp  rate 20, height 5' 6" (1 676 m), weight 86 2 kg (190 lb), SpO2 94 %  ,Body mass index is 30 67 kg/m²        Intake/Output Summary (Last 24 hours) at 01/28/18 0836  Last data filed at 01/27/18 1801   Gross per 24 hour   Intake              240 ml   Output                0 ml   Net              240 ml       Physical Exam: BP (!) 184/88 (BP Location: Right arm)   Pulse 80   Temp 98 1 °F (36 7 °C) (Oral)   Resp 20   Ht 5' 6" (1 676 m)   Wt 86 2 kg (190 lb)   SpO2 94%   BMI 30 67 kg/m²     General Appearance:    Alert, cooperative, no distress, appears stated age   Head:    Normocephalic, without obvious abnormality, atraumatic   Eyes:    PERRL, conjunctiva/corneas clear, EOM's intact, fundi     benign, both eyes   Ears:    Normal TM's and external ear canals, both ears   Nose:   Nares normal, septum midline, mucosa normal, no drainage    or sinus tenderness   Throat:   Lips, mucosa, and tongue normal; teeth and gums normal   Neck:   Supple, symmetrical, trachea midline, no adenopathy;     thyroid:  no enlargement/tenderness/nodules; no carotid    bruit or JVD   Back:     Symmetric, no curvature, ROM normal, no CVA tenderness   Lungs:     Clear to auscultation bilaterally, respirations unlabored   Chest Wall:    No tenderness or deformity    Heart:    Regular rate and rhythm, S1 and S2 normal, no murmur, rub   or gallop   Breast Exam:    No tenderness, masses, or nipple abnormality   Abdomen:     Soft, non-tender, bowel sounds active all four quadrants,     no masses, no organomegaly   Genitalia:    Normal female without lesion, discharge or tenderness   Rectal:    Normal tone, no masses or tenderness; guaiac negative stool   Extremities:   Extremities normal, atraumatic, no cyanosis or edema   Pulses:   2+ and symmetric all extremities   Skin:   Skin color, texture, turgor normal, no rashes or lesions   Lymph nodes:   Cervical, supraclavicular, and axillary nodes normal   Neurologic:   CNII-XII intact, normal strength, sensation and reflexes     throughout        Invasive Devices     Peripheral Intravenous Line            Peripheral IV 01/25/18 Right Antecubital 2 days    Peripheral IV 01/26/18 Left Wrist 2 days                Lab, Imaging and other studies: I have personally reviewed pertinent reports      VTE Pharmacologic Prophylaxis: Enoxaparin (Lovenox)  VTE Mechanical Prophylaxis: sequential compression device

## 2018-01-28 NOTE — PROGRESS NOTES
Pt observed to be awake often during hlry rounds overnight until she was offered and accepted PRN Ativan; pt reports being able to sleep thereafter  Denies discomfort/dyspnea

## 2018-01-28 NOTE — PLAN OF CARE
Problem: Potential for Falls  Goal: Patient will remain free of falls  INTERVENTIONS:  - Assess patient frequently for physical needs  -  Identify cognitive and physical deficits and behaviors that affect risk of falls  -  [place] fall precautions as indicated by assessment   - Educate patient/family on patient safety including physical limitations  - Instruct patient to call for assistance with activity based on assessment  - Modify environment to reduce risk of injury  - Consider OT/PT consult to assist with strengthening/mobility    Outcome: Adequate for Discharge      Problem: CARDIOVASCULAR - ADULT  Goal: Maintains optimal cardiac output and hemodynamic stability  INTERVENTIONS:  - Monitor I/O, vital signs and rhythm  - Monitor for S/S and trends of decreased cardiac output i e  bleeding, hypotension  - Administer and titrate ordered vasoactive medications to optimize hemodynamic stability  - Assess quality of pulses, skin color and temperature  - Assess for signs of decreased coronary artery perfusion - ex   Angina  - Instruct patient to report change in severity of symptoms   Outcome: Adequate for Discharge    Goal: Absence of cardiac dysrhythmias or at baseline rhythm  INTERVENTIONS:  - Continuous cardiac monitoring, monitor vital signs, obtain 12 lead EKG if indicated  - Administer antiarrhythmic and heart rate control medications as ordered  - Monitor electrolytes and administer replacement therapy as ordered   Outcome: Adequate for Discharge      Problem: RESPIRATORY - ADULT  Goal: Achieves optimal ventilation and oxygenation  INTERVENTIONS:  - Assess for changes in respiratory status  - Assess for changes in mentation and behavior  - Position to facilitate oxygenation and minimize respiratory effort  - Oxygen administration by appropriate delivery method based on oxygen saturation (per order) or ABGs  - Initiate smoking cessation education as indicated  - Encourage broncho-pulmonary hygiene including cough, deep breathe, Incentive Spirometry  - Assess the need for suctioning and aspirate as needed  - Assess and instruct to report SOB or any respiratory difficulty  - Respiratory Therapy support as indicated   Outcome: Adequate for Discharge      Problem: METABOLIC, FLUID AND ELECTROLYTES - ADULT  Goal: Electrolytes maintained within normal limits  INTERVENTIONS:  - Monitor labs and assess patient for signs and symptoms of electrolyte imbalances  - Administer electrolyte replacement as ordered  - Monitor response to electrolyte replacements, including repeat lab results as appropriate  - Instruct patient on fluid and nutrition as appropriate   Outcome: Adequate for Discharge    Goal: Fluid balance maintained  INTERVENTIONS:  - Monitor labs and assess for signs and symptoms of volume excess or deficit  - Monitor I/O and WT  - Instruct patient on fluid and nutrition as appropriate    Outcome: Adequate for Discharge    Goal: Glucose maintained within target range  INTERVENTIONS:  - Monitor Blood Glucose as ordered  - Assess for signs and symptoms of hyperglycemia and hypoglycemia  - Administer ordered medications to maintain glucose within target range  - Assess nutritional intake and initiate nutrition service referral as needed   Outcome: Adequate for Discharge      Problem: PAIN - ADULT  Goal: Verbalizes/displays adequate comfort level or baseline comfort level  Interventions:  - Encourage patient to monitor pain and request assistance  - Assess pain using appropriate pain scale  - Administer analgesics based on type and severity of pain and evaluate response  - Implement non-pharmacological measures as appropriate and evaluate response  - Consider cultural and social influences on pain and pain management  - Notify physician/advanced practitioner if interventions unsuccessful or patient reports new pain   Outcome: Adequate for Discharge      Problem: INFECTION - ADULT  Goal: Absence or prevention of progression during hospitalization  INTERVENTIONS:  - Assess and monitor for signs and symptoms of infection  - Monitor lab/diagnostic results  - Monitor all insertion sites, i e  indwelling lines, tubes, and drains  - Monitor endotracheal (as able) and nasal secretions for changes in amount and color  - [place] appropriate cooling/warming therapies per order  - Administer medications as ordered  - Instruct and encourage patient and family to use good hand hygiene technique  - Identify and instruct in appropriate isolation precautions for identified infection/condition   Outcome: Adequate for Discharge      Problem: Knowledge Deficit  Goal: Patient/family/caregiver demonstrates understanding of disease process, treatment plan, medications, and discharge instructions  Complete learning assessment and assess knowledge base  Interventions:  - Provide teaching at level of understanding  - Provide teaching via preferred learning methods   Outcome: Adequate for Discharge      Problem: SAFETY ADULT  Goal: Patient will remain free of falls  INTERVENTIONS:  - Assess patient frequently for physical needs  -  Identify cognitive and physical deficits and behaviors that affect risk of falls    -  [place] fall precautions as indicated by assessment   - Educate patient/family on patient safety including physical limitations  - Instruct patient to call for assistance with activity based on assessment  - Modify environment to reduce risk of injury  - Consider OT/PT consult to assist with strengthening/mobility    Outcome: Adequate for Discharge      Problem: DISCHARGE PLANNING  Goal: Discharge to home or other facility with appropriate resources  INTERVENTIONS:  - Identify barriers to discharge w/patient and caregiver  - Arrange for needed discharge resources and transportation as appropriate  - Identify discharge learning needs (meds, wound care, etc )  - Arrange for interpretive services to assist at discharge as needed  - Refer to Case Management Department for coordinating discharge planning if the patient needs post-hospital services based on physician/advanced practitioner order or complex needs related to functional status, cognitive ability, or social support system   Outcome: Adequate for Discharge      Problem: SKIN/TISSUE INTEGRITY - ADULT  Goal: Skin integrity remains intact  INTERVENTIONS  - Identify patients at risk for skin breakdown  - Assess and monitor skin integrity  - Assess and monitor nutrition and hydration status  - Monitor labs (i e  albumin)  - Assess for incontinence   - Turn and reposition patient  - Assist with mobility/ambulation  - Relieve pressure over bony prominences  - Avoid friction and shearing  - Provide appropriate hygiene as needed including keeping skin clean and dry  - Evaluate need for skin moisturizer/barrier cream  - Collaborate with interdisciplinary team (i e  Nutrition, Rehabilitation, etc )   - Patient/family teaching   Outcome: Adequate for Discharge      Problem: MUSCULOSKELETAL - ADULT  Goal: Maintain or return mobility to safest level of function  INTERVENTIONS:  - Assess patient's ability to carry out ADLs; assess patient's baseline for ADL function and identify physical deficits which impact ability to perform ADLs (bathing, care of mouth/teeth, toileting, grooming, dressing, etc )  - Assess/evaluate cause of self-care deficits   - Assess range of motion  - Assess patient's mobility; develop plan if impaired  - Assess patient's need for assistive devices and provide as appropriate  - Encourage maximum independence but intervene and supervise when necessary  - Involve family in performance of ADLs  - Assess for home care needs following discharge   - Request OT consult to assist with ADL evaluation and planning for discharge  - Provide patient education as appropriate   Outcome: Adequate for Discharge

## 2018-01-28 NOTE — DISCHARGE SUMMARY
Discharge Summary - Claire Villanueva 78 y o  female MRN: [id number]    Unit/Bed#: [address] [place] [address] Encounter: [id number]    Admission Date: 1/25/2018     Admitting Diagnosis: Cough [R05]  Laryngospasms [J38 5]  Leukocytosis [D72 829]  Tachycardia [R00 0]  Bronchitis [J40]  Fever [R50 9]  Flu-like symptoms [R68 89]  Influenza A  HPI: Assessment:  78 yr old female with pmhx of GERD, asthma that only requires occasional use of inhalers presenting with acute sob, cough and fever      - found to have influenza A, c/w tamiflu , c/w monitoring , discharge  today  - laryngo/bronchospam  -GERD  -RAO with baseline creatinine of 0 8, iv hydration     Plan:  - d/c  today  , c/w tamiflu   - c/w  po prednisone with bronchodilators  - oxygen supplementation as needed to keep saturation >90%  - Continue her PPI      Procedures Performed:   Orders Placed This Encounter   Procedures   [address] [facility] [address] Course: treated with tamiflu , doing better, to be discharged on tamiflu and tapering prednisone  Significant Findings, Care, Treatment and Services Provided: influenza A    Complications: none    Discharge Diagnosis: influenza A    Condition at Discharge: good     Discharge instructions/Information to patient and family:   See after visit summary for information provided to patient and family  Provisions for Follow-Up Care:  See after visit summary for information related to follow-up care and any pertinent home health orders  Disposition: Home    Planned Readmission: No    Discharge Statement   I spent 30 minutes discharging the patient  This time was spent on the day of discharge  I had direct contact with the patient on the day of discharge  Additional documentation is required if more than 30 minutes were spent on discharge  Discharge Medications:  See after visit summary for reconciled discharge medications provided to patient and family

## 2018-01-30 ENCOUNTER — TRANSITIONAL CARE MANAGEMENT (OUTPATIENT)
Dept: FAMILY MEDICINE CLINIC | Facility: HOSPITAL | Age: 68
End: 2018-01-30

## 2018-01-30 LAB
BACTERIA SPT RESP CULT: NORMAL
GRAM STN SPEC: NORMAL

## 2018-01-31 LAB
BACTERIA BLD CULT: NORMAL
BACTERIA BLD CULT: NORMAL

## 2018-02-05 ENCOUNTER — TRANSITIONAL CARE MANAGEMENT (OUTPATIENT)
Dept: FAMILY MEDICINE CLINIC | Facility: HOSPITAL | Age: 68
End: 2018-02-05

## 2018-02-05 ENCOUNTER — OFFICE VISIT (OUTPATIENT)
Dept: FAMILY MEDICINE CLINIC | Facility: HOSPITAL | Age: 68
End: 2018-02-05
Payer: MEDICARE

## 2018-02-05 VITALS
OXYGEN SATURATION: 95 % | SYSTOLIC BLOOD PRESSURE: 128 MMHG | HEIGHT: 66 IN | HEART RATE: 87 BPM | BODY MASS INDEX: 31.9 KG/M2 | TEMPERATURE: 99.2 F | DIASTOLIC BLOOD PRESSURE: 90 MMHG | WEIGHT: 198.5 LBS

## 2018-02-05 DIAGNOSIS — J45.21 MILD INTERMITTENT ASTHMA WITH ACUTE EXACERBATION: Primary | ICD-10-CM

## 2018-02-05 PROCEDURE — 99495 TRANSJ CARE MGMT MOD F2F 14D: CPT | Performed by: INTERNAL MEDICINE

## 2018-02-05 RX ORDER — ERGOCALCIFEROL 1.25 MG/1
1 CAPSULE ORAL WEEKLY
COMMUNITY
Start: 2017-06-15 | End: 2018-07-26 | Stop reason: HOSPADM

## 2018-02-05 RX ORDER — MONTELUKAST SODIUM 10 MG/1
10 TABLET ORAL
Qty: 90 TABLET | Refills: 3 | Status: SHIPPED | OUTPATIENT
Start: 2018-02-05 | End: 2019-02-06 | Stop reason: SDUPTHER

## 2018-02-05 RX ORDER — ALBUTEROL SULFATE 2.5 MG/3ML
SOLUTION RESPIRATORY (INHALATION)
COMMUNITY
Start: 2016-03-14 | End: 2020-07-09 | Stop reason: ALTCHOICE

## 2018-02-05 RX ORDER — NICOTINE POLACRILEX 4 MG/1
1 GUM, CHEWING ORAL 2 TIMES DAILY
COMMUNITY
Start: 2015-05-26 | End: 2018-04-16 | Stop reason: SDUPTHER

## 2018-02-05 NOTE — PROGRESS NOTES
Assessment/Plan:        Problem List Items Addressed This Visit     None           Subjective:     Patient ID: Renee Thompson is a 78 y o  female  1 eugenio- was in hospital [facility] with influenza a and severe bronchospasm- discharged on tapering prednisone and now off that-had severe bronchospasm- did better after neb then had lidocaine in neb and cough meds helped  Now with minial discolored phelgm-  was also treated with tamiflu for prevention  May return to work tomorrow but cautioned in u sing antibacterial wipes in common areas  2  Asthma- to use albuterol bid for now      Current Outpatient Prescriptions on File Prior to Visit   Medication Sig Dispense Refill    ipratropium-albuterol (DUONEB) 0 5-2 5 mg/3 mL Take 3 mL by nebulization every 6 (six) hours as needed for wheezing      loratadine (CLARITIN) 10 mg tablet Take 10 mg by mouth daily   montelukast (SINGULAIR) 10 mg tablet Take 10 mg by mouth daily at bedtime   omeprazole (PriLOSEC) 40 MG capsule Take 40 mg by mouth daily   hydrocodone-chlorpheniramine polistirex (TUSSIONEX) 10-8 mg/5 mL ER suspension Take 5 mL by mouth every 12 (twelve) hours as needed for cough for up to 10 days Max Daily Amount: 10 mL 120 mL 0    [DISCONTINUED] predniSONE 5 mg tablet Take 1 tablet (5 mg total) by mouth daily for 2 days 2 tablet 0     No current facility-administered medications on file prior to visit  Review of Systems   Constitutional: Negative for chills, fatigue and fever  HENT: Positive for congestion  Negative for sinus pressure and sore throat  Eyes: Negative  Respiratory: Positive for cough  Negative for shortness of breath, wheezing and stridor  Cardiovascular: Negative for palpitations  Gastrointestinal: Negative  Neurological: Negative  All other systems reviewed and are negative  Objective:     Physical Exam   Constitutional: She is oriented to person, place, and time   She appears well-developed and well-nourished  HENT:   Head: Normocephalic and atraumatic  Mouth/Throat: No oropharyngeal exudate  Mild pharyngeal injection   Eyes: Right eye exhibits no discharge  Left eye exhibits no discharge  Neck: No JVD present  Cardiovascular: Normal rate and regular rhythm  Exam reveals no friction rub  No murmur heard  Pulmonary/Chest: Effort normal  She has no wheezes  Some prolonged expiratory phase   Abdominal: Soft  Bowel sounds are normal  She exhibits no distension  There is no tenderness  Musculoskeletal: She exhibits no edema or tenderness  Neurological: She is alert and oriented to person, place, and time  Skin: Skin is warm and dry  Psychiatric: She has a normal mood and affect  Her behavior is normal    Nursing note and vitals reviewed  Vitals:    02/05/18 1328   BP: 128/90   BP Location: Left arm   Patient Position: Sitting   Cuff Size: Large   Pulse: 87   Temp: 99 2 °F (37 3 °C)   TempSrc: Tympanic   SpO2: 95%   Weight: 90 kg (198 lb 8 oz)   Height: 5' 6" (1 676 m)       Transitional Care Management Review:  Suresh Weaver is a 78 y o  female here for TCM follow up       During the TCM phone call patient stated:    Date and time hospital follow up call was made:  1/30/2018  5:20 PM  Hospital care reviewed:  Records reviewed  Patient was hopsitalized at:  [address]  Date of admission:  1/25/18  Date of discharge:  1/28/18  Diagnosis:  Influenza A  Were the patients medicaitons reviewed and updated:  Yes  Current symptoms:  Cough  Cough Severity:  Moderate  Scheduled for follow up?:  Yes  Living Arrangements:  Spouse or Significiant other  Are you recieving outpatient services:  No  Are you recieving home care services:  Emliy Carrillo, DO

## 2018-04-16 DIAGNOSIS — K21.9 GASTROESOPHAGEAL REFLUX DISEASE, ESOPHAGITIS PRESENCE NOT SPECIFIED: Primary | ICD-10-CM

## 2018-04-16 RX ORDER — NICOTINE POLACRILEX 4 MG/1
20 GUM, CHEWING ORAL 2 TIMES DAILY
Qty: 180 TABLET | Refills: 1 | Status: SHIPPED | OUTPATIENT
Start: 2018-04-16 | End: 2018-10-31 | Stop reason: SDUPTHER

## 2018-05-07 ENCOUNTER — OFFICE VISIT (OUTPATIENT)
Dept: FAMILY MEDICINE CLINIC | Facility: HOSPITAL | Age: 68
End: 2018-05-07
Payer: MEDICARE

## 2018-05-07 VITALS
WEIGHT: 206 LBS | DIASTOLIC BLOOD PRESSURE: 80 MMHG | SYSTOLIC BLOOD PRESSURE: 118 MMHG | BODY MASS INDEX: 33.11 KG/M2 | HEART RATE: 76 BPM | HEIGHT: 66 IN | OXYGEN SATURATION: 93 %

## 2018-05-07 DIAGNOSIS — J44.1 COPD WITH ACUTE EXACERBATION (HCC): ICD-10-CM

## 2018-05-07 DIAGNOSIS — J30.89 SEASONAL ALLERGIC RHINITIS DUE TO OTHER ALLERGIC TRIGGER: ICD-10-CM

## 2018-05-07 DIAGNOSIS — J45.40 ASTHMA, MODERATE PERSISTENT, WELL-CONTROLLED: ICD-10-CM

## 2018-05-07 DIAGNOSIS — Z23 NEED FOR PNEUMOCOCCAL VACCINATION: ICD-10-CM

## 2018-05-07 DIAGNOSIS — J40 BRONCHITIS: ICD-10-CM

## 2018-05-07 DIAGNOSIS — R63.5 WEIGHT GAIN: ICD-10-CM

## 2018-05-07 DIAGNOSIS — J10.1 INFLUENZA B: Primary | ICD-10-CM

## 2018-05-07 PROBLEM — R05.9 COUGH: Status: RESOLVED | Noted: 2018-01-26 | Resolved: 2018-05-07

## 2018-05-07 PROBLEM — J18.9 PNEUMONIA DUE TO INFECTIOUS ORGANISM: Status: RESOLVED | Noted: 2018-01-26 | Resolved: 2018-05-07

## 2018-05-07 PROCEDURE — 90670 PCV13 VACCINE IM: CPT

## 2018-05-07 PROCEDURE — G0009 ADMIN PNEUMOCOCCAL VACCINE: HCPCS

## 2018-05-07 PROCEDURE — 99214 OFFICE O/P EST MOD 30 MIN: CPT | Performed by: INTERNAL MEDICINE

## 2018-05-07 NOTE — PROGRESS NOTES
Assessment/Plan:         Problem List Items Addressed This Visit        Respiratory    COPD with acute exacerbation ([address] )    Asthma, well controlled     Some congestion with recent spring allergies- no wheezes         Relevant Orders    CBC and differential    Influenza B - Primary    Bronchitis    Allergic rhinitis      Other Visit Diagnoses     Weight gain        Relevant Orders    Comprehensive metabolic panel    Lipid Panel with Direct LDL reflex    Need for pneumococcal vaccination        Relevant Orders    PNEUMOCOCCAL CONJUGATE VACCINE 13-VALENT GREATER THAN 6 MONTHS (Completed)            Subjective:      Patient ID: Lucio Locke is a 78 y o  female    HPI  Had previous episode of pneumonia influenza B and exacerbation of her COPD earlier this year but that has resolved  Some upper airway cough but no sputum production  The following portions of the patient's history were reviewed and updated as appropriate: allergies, current medications and problem list      Review of Systems  Otherwise negative    Objective:      Current Outpatient Prescriptions:     albuterol (2 5 mg/3 mL) 0 083 % nebulizer solution, Inhale, Disp: , Rfl:     ergocalciferol (VITAMIN D2) 50,000 units, Take 1 capsule by mouth once a week, Disp: , Rfl:     ipratropium-albuterol (DUONEB) 0 5-2 5 mg/3 mL, Take 3 mL by nebulization every 6 (six) hours as needed for wheezing, Disp: , Rfl:     loratadine (CLARITIN) 10 mg tablet, Take 10 mg by mouth daily  , Disp: , Rfl:     montelukast (SINGULAIR) 10 mg tablet, Take 1 tablet (10 mg total) by mouth daily at bedtime, Disp: 90 tablet, Rfl: 3    Multiple Vitamin (MULTI VITAMIN DAILY PO), Take 1 tablet by mouth daily, Disp: , Rfl:     Omeprazole 20 MG TBEC, Take 1 tablet (20 mg total) by mouth 2 (two) times a day, Disp: 180 tablet, Rfl: 1    omeprazole (PriLOSEC) 40 MG capsule, Take 40 mg by mouth daily  , Disp: , Rfl:        Physical Exam   HEENT TMs are clear minimal clear postnasal drip in swelling of the nasal turbinates no venous jugular distention or carotid bruits  Heart regular rate and rhythm without extrasystoles no murmurs or rubs  l clear without rales or rhonchi some minimal upper airway cough  Abdomen-soft no guarding or rigidity  Extremities some minimal tenderness in the knees medially no calf tenderness or pitting edema noted

## 2018-05-26 DIAGNOSIS — R05.9 COUGH: Primary | ICD-10-CM

## 2018-05-26 RX ORDER — HYDROCODONE POLISTIREX AND CHLORPHENIRAMINE POLISTIREX 10; 8 MG/5ML; MG/5ML
5 SUSPENSION, EXTENDED RELEASE ORAL EVERY 12 HOURS PRN
Qty: 120 ML | Refills: 0 | Status: SHIPPED | OUTPATIENT
Start: 2018-05-26 | End: 2018-06-04 | Stop reason: CLARIF

## 2018-05-26 NOTE — PROGRESS NOTES
Patient called has a cough  She is worried about going the bronchospasm  She was previously on Tussionex and I sent that in  I told her she gets worse she needs to be seen the emergency room

## 2018-05-27 ENCOUNTER — OFFICE VISIT (OUTPATIENT)
Dept: URGENT CARE | Facility: CLINIC | Age: 68
End: 2018-05-27
Payer: MEDICARE

## 2018-05-27 VITALS
TEMPERATURE: 100.8 F | OXYGEN SATURATION: 94 % | WEIGHT: 202 LBS | SYSTOLIC BLOOD PRESSURE: 128 MMHG | RESPIRATION RATE: 16 BRPM | DIASTOLIC BLOOD PRESSURE: 58 MMHG | HEART RATE: 94 BPM | HEIGHT: 66 IN | BODY MASS INDEX: 32.47 KG/M2

## 2018-05-27 DIAGNOSIS — J20.9 ACUTE BRONCHITIS, UNSPECIFIED ORGANISM: Primary | ICD-10-CM

## 2018-05-27 PROCEDURE — G0463 HOSPITAL OUTPT CLINIC VISIT: HCPCS

## 2018-05-27 PROCEDURE — 99203 OFFICE O/P NEW LOW 30 MIN: CPT

## 2018-05-27 RX ORDER — AMOXICILLIN AND CLAVULANATE POTASSIUM 875; 125 MG/1; MG/1
1 TABLET, FILM COATED ORAL EVERY 12 HOURS SCHEDULED
Qty: 14 TABLET | Refills: 0 | Status: SHIPPED | OUTPATIENT
Start: 2018-05-27 | End: 2018-06-04 | Stop reason: CLARIF

## 2018-05-27 RX ORDER — PREDNISONE 50 MG/1
50 TABLET ORAL DAILY
Qty: 5 TABLET | Refills: 0 | Status: SHIPPED | OUTPATIENT
Start: 2018-05-27 | End: 2018-06-01

## 2018-05-27 NOTE — PATIENT INSTRUCTIONS
Acute Bronchitis   WHAT YOU NEED TO KNOW:   Acute bronchitis is swelling and irritation in the air passages of your lungs  This irritation may cause you to cough or have other breathing problems  Acute bronchitis often starts because of another illness, such as a cold or the flu  The illness spreads from your nose and throat to your windpipe and airways  Bronchitis is often called a chest cold  Acute bronchitis lasts about 3 to 6 weeks and is usually not a serious illness  Your cough can last for several weeks  DISCHARGE INSTRUCTIONS:   Return to the emergency department if:   · You cough up blood  · Your lips or fingernails turn blue  · You feel like you are not getting enough air when you breathe  Contact your healthcare provider if:   · You have a fever  · Your breathing problems do not go away or get worse  · Your cough does not get better within 4 weeks  · You have questions or concerns about your condition or care  Self-care:   · Get more rest   Rest helps your body to heal  Slowly start to do more each day  Rest when you feel it is needed  · Avoid irritants in the air  Avoid chemicals, fumes, and dust  Wear a face mask if you must work around dust or fumes  Stay inside on days when air pollution levels are high  If you have allergies, stay inside when pollen counts are high  Do not use aerosol products, such as spray-on deodorant, bug spray, and hair spray  · Do not smoke or be around others who smoke  Nicotine and other chemicals in cigarettes and cigars damages the cilia that move mucus out of your lungs  Ask your healthcare provider for information if you currently smoke and need help to quit  E-cigarettes or smokeless tobacco still contain nicotine  Talk to your healthcare provider before you use these products  · Drink liquids as directed  Liquids help keep your air passages moist and help you cough up mucus   You may need to drink more liquids when you have acute bronchitis  Ask how much liquid to drink each day and which liquids are best for you  · Use a humidifier or vaporizer  Use a cool mist humidifier or a vaporizer to increase air moisture in your home  This may make it easier for you to breathe and help decrease your cough  Decrease risk for acute bronchitis:   · Get the vaccinations you need  Ask your healthcare provider if you should get vaccinated against the flu or pneumonia  · Prevent the spread of germs  You can decrease your risk of acute bronchitis and other illnesses by doing the following:     [facility] your hands often with soap and water  Carry germ-killing hand lotion or gel with you  You can use the lotion or gel to clean your hands when soap and water are not available  ¨ Do not touch your eyes, nose, or mouth unless you have washed your hands first     ¨ Always cover your mouth when you cough to prevent the spread of germs  It is best to cough into a tissue or your shirt sleeve instead of into your hand  Ask those around you cover their mouths when they cough  ¨ Try to avoid people who have a cold or the flu  If you are sick, stay away from others as much as possible  Medicines: Your healthcare provider may  give you any of the following:  · Ibuprofen or acetaminophen  are medicines that help lower your fever  They are available without a doctor's order  Ask your healthcare provider which medicine is right for you  Ask how much to take and how often to take it  Follow directions  These medicines can cause stomach bleeding if not taken correctly  Ibuprofen can cause kidney damage  Do not take ibuprofen if you have kidney disease, an ulcer, or allergies to aspirin  Acetaminophen can cause liver damage  Do not take more than 4,000 milligrams in 24 hours  · Decongestants  help loosen mucus in your lungs and make it easier to cough up  This can help you breathe easier  · Cough suppressants  decrease your urge to cough   If your cough produces mucus, do not take a cough suppressant unless your healthcare provider tells you to  Your healthcare provider may suggest that you take a cough suppressant at night so you can rest     · Inhalers  may be given  Your healthcare provider may give you one or more inhalers to help you breathe easier and cough less  An inhaler gives your medicine to open your airways  Ask your healthcare provider to show you how to use your inhaler correctly  · Take your medicine as directed  Contact your healthcare provider if you think your medicine is not helping or if you have side effects  Tell him of her if you are allergic to any medicine  Keep a list of the medicines, vitamins, and herbs you take  Include the amounts, and when and why you take them  Bring the list or the pill bottles to follow-up visits  Carry your medicine list with you in case of an emergency  Follow up with your healthcare provider as directed:  Write down questions you have so you will remember to ask them during your follow-up visits  © 2017 [address] [employer] Information is for End User's use only and may not be sold, redistributed or otherwise used for commercial purposes  All illustrations and images included in CareNotes® are the copyrighted property of [employer]  or [address]  The above information is an  only  It is not intended as medical advice for individual conditions or treatments  Talk to your doctor, nurse or pharmacist before following any medical regimen to see if it is safe and effective for you

## 2018-05-27 NOTE — PROGRESS NOTES
[facility]        NAME: Efrain Delgado is a 78 y o  female  : 1950    MRN: [id number]  DATE: May 27, 2018  TIME: 3:12 PM    Assessment and Plan   Acute bronchitis, unspecified organism [[address] 9]  1  Acute bronchitis, unspecified organism  predniSONE 50 mg tablet    amoxicillin-clavulanate (AUGMENTIN) 875-125 mg per tablet         Patient Instructions       Follow up with PCP in 3-5 days  Proceed to  ER if symptoms worsen  Chief Complaint     Chief Complaint   Patient presents with    Cough     Started friday with dry cough and turned into green mucous  Has sinus pressure, headache, post nasal drip, coughing up green phlegm, SOB  Called PCP and ordered cough medication & recommended urgent care  Hx) resp issue  Hospx2 for flu/PNA with laragnial spasms  History of Present Illness       3 day hx of cough and spoke with pcp and was ordered tussinex   States it hasent vbeen working  Today + ha, coiugh with green sputum, wet couigh  sore throat  Denies ear pain, n/v/d,    Hx of multiple hosp for pna last in 2018        Review of Systems   Review of Systems   Constitutional: Negative  HENT: Positive for rhinorrhea  Negative for congestion, ear discharge, ear pain, facial swelling, sinus pain, sinus pressure and sore throat  Eyes: Negative  Negative for discharge and redness  Respiratory: Positive for cough and wheezing  Negative for shortness of breath  Cardiovascular: Negative  Negative for chest pain  Gastrointestinal: Negative  Negative for abdominal pain  Endocrine: Negative  Negative for cold intolerance  Genitourinary: Negative  Negative for dyspareunia  Musculoskeletal: Negative  Negative for gait problem  Skin: Negative  Negative for color change  Allergic/Immunologic: Negative for environmental allergies and food allergies  Neurological: Positive for headaches  Negative for weakness  Psychiatric/Behavioral: Negative            Current Medications       Current Outpatient Prescriptions:     albuterol (2 5 mg/3 mL) 0 083 % nebulizer solution, Inhale, Disp: , Rfl:     ergocalciferol (VITAMIN D2) 50,000 units, Take 1 capsule by mouth once a week, Disp: , Rfl:     fluticasone-salmeterol (ADVAIR) 250-50 mcg/dose inhaler, Inhale 1 puff every 12 (twelve) hours, Disp: , Rfl:     hydrocodone-chlorpheniramine polistirex (TUSSIONEX PENNKINETIC ER) 10-8 mg/5 mL ER suspension, Take 5 mL by mouth every 12 (twelve) hours as needed for cough Max Daily Amount: 10 mL, Disp: 120 mL, Rfl: 0    ipratropium-albuterol (DUONEB) 0 5-2 5 mg/3 mL, Take 3 mL by nebulization every 6 (six) hours as needed for wheezing, Disp: , Rfl:     loratadine (CLARITIN) 10 mg tablet, Take 10 mg by mouth daily  , Disp: , Rfl:     montelukast (SINGULAIR) 10 mg tablet, Take 1 tablet (10 mg total) by mouth daily at bedtime, Disp: 90 tablet, Rfl: 3    Multiple Vitamin (MULTI VITAMIN DAILY PO), Take 1 tablet by mouth daily, Disp: , Rfl:     omeprazole (PriLOSEC) 40 MG capsule, Take 40 mg by mouth daily  , Disp: , Rfl:     amoxicillin-clavulanate (AUGMENTIN) 875-125 mg per tablet, Take 1 tablet by mouth every 12 (twelve) hours for 7 days, Disp: 14 tablet, Rfl: 0    Omeprazole 20 MG TBEC, Take 1 tablet (20 mg total) by mouth 2 (two) times a day, Disp: 180 tablet, Rfl: 1    predniSONE 50 mg tablet, Take 1 tablet (50 mg total) by mouth daily for 5 days, Disp: 5 tablet, Rfl: 0    Current Allergies     Allergies as of 05/27/2018 - Reviewed 05/27/2018   Allergen Reaction Noted    Codeine  02/21/2016    Erythromycin base  02/21/2016    Etodolac  10/24/2014    Meloxicam  10/24/2014    Oxaprozin  10/24/2014    Tetracyclines & related  10/24/2014            The following portions of the patient's history were reviewed and updated as appropriate: allergies, current medications, past family history, past medical history, past social history, past surgical history and problem list      Past Medical History:   Diagnosis Date    Asthma     Closed femur fracture ([address] )     and humerus resolved: 1/13/2017    COPD (chronic obstructive pulmonary disease) (HCC)     GERD (gastroesophageal reflux disease)     Influenza, pneumonia     resolved: 4/20/2017    Laryngeal spasm 2/22/2016    Leukocytosis 2/22/2016    Thyroid disorder     suppressed TSH    Traumatic arthropathy of left shoulder        Past Surgical History:   Procedure Laterality Date    CHOLECYSTECTOMY      ERCP  06/2014    HYSTERECTOMY      JOINT REPLACEMENT  r hip replacement, fx r shoulder    ORIF HIP FRACTURE Right        Family History   Problem Relation Age of Onset    Pulmonary embolism Daughter     Pancreatic cancer Mother     Heart disease Father     Heart attack Family          Medications have been verified  Objective   /58   Pulse 94   Temp (!) 100 8 °F (38 2 °C)   Resp 16   Ht 5' 6" (1 676 m)   Wt 91 6 kg (202 lb)   LMP  (LMP Unknown)   SpO2 94%   BMI 32 60 kg/m²        Physical Exam     Physical Exam   Constitutional: She is oriented to person, place, and time  She appears well-developed and well-nourished  She is not intubated  HENT:   Head: Normocephalic and atraumatic  Eyes: Conjunctivae and EOM are normal  Pupils are equal, round, and reactive to light  Right eye exhibits no discharge  Left eye exhibits no discharge  Neck: Normal range of motion  Neck supple  No thyromegaly present  Cardiovascular: Normal rate and normal heart sounds  Exam reveals no gallop and no friction rub  No murmur heard  Pulmonary/Chest: Effort normal  No apnea, no tachypnea and no bradypnea  She is not intubated  No respiratory distress  She has decreased breath sounds in the right lower field and the left lower field  She has wheezes in the right upper field and the left upper field  She has rhonchi in the right upper field and the left upper field  Abdominal: Soft   Bowel sounds are normal  She exhibits no distension and no mass  There is no tenderness  There is no rebound and no guarding  Musculoskeletal: Normal range of motion  She exhibits no edema  Neurological: She is alert and oriented to person, place, and time  Skin: Skin is warm  No pallor  Psychiatric: She has a normal mood and affect  Her behavior is normal  Judgment and thought content normal    Nursing note and vitals reviewed

## 2018-06-04 ENCOUNTER — OFFICE VISIT (OUTPATIENT)
Dept: FAMILY MEDICINE CLINIC | Facility: HOSPITAL | Age: 68
End: 2018-06-04
Payer: MEDICARE

## 2018-06-04 ENCOUNTER — HOSPITAL ENCOUNTER (OUTPATIENT)
Dept: RADIOLOGY | Facility: HOSPITAL | Age: 68
Discharge: HOME | End: 2018-06-04
Payer: MEDICARE

## 2018-06-04 VITALS
BODY MASS INDEX: 31.82 KG/M2 | DIASTOLIC BLOOD PRESSURE: 82 MMHG | TEMPERATURE: 99.3 F | SYSTOLIC BLOOD PRESSURE: 122 MMHG | RESPIRATION RATE: 16 BRPM | HEIGHT: 66 IN | WEIGHT: 198 LBS

## 2018-06-04 DIAGNOSIS — R05.9 COUGH: Primary | ICD-10-CM

## 2018-06-04 DIAGNOSIS — J44.9 CHRONIC OBSTRUCTIVE PULMONARY DISEASE, UNSPECIFIED COPD TYPE (HCC): ICD-10-CM

## 2018-06-04 DIAGNOSIS — R05.9 COUGH: ICD-10-CM

## 2018-06-04 DIAGNOSIS — J32.9 SINUSITIS, UNSPECIFIED CHRONICITY, UNSPECIFIED LOCATION: ICD-10-CM

## 2018-06-04 PROCEDURE — 99213 OFFICE O/P EST LOW 20 MIN: CPT | Performed by: PHYSICIAN ASSISTANT

## 2018-06-04 PROCEDURE — 71046 X-RAY EXAM CHEST 2 VIEWS: CPT

## 2018-06-04 RX ORDER — ALBUTEROL SULFATE 90 UG/1
2 AEROSOL, METERED RESPIRATORY (INHALATION) EVERY 6 HOURS PRN
Qty: 1 INHALER | Refills: 3 | COMMUNITY
Start: 2018-06-04 | End: 2018-07-13 | Stop reason: SDUPTHER

## 2018-06-04 RX ORDER — PROMETHAZINE HYDROCHLORIDE AND CODEINE PHOSPHATE 6.25; 1 MG/5ML; MG/5ML
5 SYRUP ORAL EVERY 4 HOURS PRN
Qty: 120 ML | Refills: 0 | Status: SHIPPED | OUTPATIENT
Start: 2018-06-04 | End: 2018-07-13 | Stop reason: SDUPTHER

## 2018-06-04 RX ORDER — CEFUROXIME AXETIL 250 MG/1
250 TABLET ORAL EVERY 12 HOURS SCHEDULED
Qty: 28 TABLET | Refills: 0 | Status: SHIPPED | OUTPATIENT
Start: 2018-06-04 | End: 2019-04-29 | Stop reason: SDUPTHER

## 2018-06-04 NOTE — PATIENT INSTRUCTIONS
Patient sent to get chest x-ray  Start Ceftin 250 mg  Bid for 10 days, and use inhalers, and nebs  Treatment regularly  Will hold off using oral prednisone tabs  At this time  Referred to pulmonary for evaluation  Reviewed/discussed medications

## 2018-06-04 NOTE — PROGRESS NOTES
Assessment/Plan:         Diagnoses and all orders for this visit:    Cough  -     XR chest pa & lateral; Future  -     Ambulatory referral to Pulmonology; Future  -     cefuroxime (CEFTIN) 250 mg tablet; Take 1 tablet (250 mg total) by mouth every 12 (twelve) hours for 14 days  -     promethazine-codeine (PHENERGAN WITH CODEINE) 6 25-10 mg/5 mL syrup; Take 5 mL by mouth every 4 (four) hours as needed for cough    Chronic obstructive pulmonary disease, unspecified COPD type ([facility] )  -     Ambulatory referral to Pulmonology; Future  -     cefuroxime (CEFTIN) 250 mg tablet; Take 1 tablet (250 mg total) by mouth every 12 (twelve) hours for 14 days  -     promethazine-codeine (PHENERGAN WITH CODEINE) 6 25-10 mg/5 mL syrup; Take 5 mL by mouth every 4 (four) hours as needed for cough    Sinusitis, unspecified chronicity, unspecified location  -     cefuroxime (CEFTIN) 250 mg tablet; Take 1 tablet (250 mg total) by mouth every 12 (twelve) hours for 14 days    Other orders  -     albuterol (PROVENTIL HFA,VENTOLIN HFA) 90 mcg/act inhaler; Inhale 2 puffs every 6 (six) hours as needed for wheezing        Subjective:      Patient ID: Antonio Bermudez is a 78 y o  female  78year old white female c/o cough and congestion since May 25th  Went to urgent care on 5/27/18  Was started on Prednisone for 5 days, and one week of Augmentin for one week  Spent most of past  weekend on the couch due to fatigue  Took last dose of Augmentin Saturday  Symptoms improved, but still has fever, but getting worse again, going backward with sx  Has sinus pain and pressure, and post nasal drip  Cough productive of phlegm, yellow/green  Cough worse during the day, now getting better  Review of Systems   Constitutional: Positive for chills, diaphoresis, fatigue and fever  HENT: Positive for congestion, postnasal drip, rhinorrhea, sinus pain, sinus pressure and sore throat  Negative for ear pain           Sore throat now improved  Respiratory: Positive for cough and shortness of breath  Negative for wheezing  Shortness of breath when moving around  Neurological: Positive for headaches  Negative for dizziness and light-headedness           Objective:      /82   Temp 99 3 °F (37 4 °C) (Tympanic)   Resp 16   Ht 5' 6" (1 676 m)   Wt 89 8 kg (198 lb)   LMP  (LMP Unknown)   BMI 31 96 kg/m²          Physical Exam

## 2018-06-14 ENCOUNTER — TELEPHONE (OUTPATIENT)
Dept: FAMILY MEDICINE CLINIC | Facility: HOSPITAL | Age: 68
End: 2018-06-14

## 2018-06-14 DIAGNOSIS — J45.909 UNCOMPLICATED ASTHMA, UNSPECIFIED ASTHMA SEVERITY, UNSPECIFIED WHETHER PERSISTENT: Primary | ICD-10-CM

## 2018-06-14 RX ORDER — METHYLPREDNISOLONE 4 MG/1
TABLET ORAL
Qty: 21 TABLET | Refills: 0
Start: 2018-06-14

## 2018-06-14 NOTE — TELEPHONE ENCOUNTER
Dr Brannon Body left without addressing this  Patient leaves tomorrow for vacation  She has had two courses of antibiotics however it has not helped  She thinks she needs steroids  This usually helps  Can you send Rx to  [facility]

## 2018-06-14 NOTE — TELEPHONE ENCOUNTER
Can I please have directions how many tablets would you like her to have there is an option for 4 mg tablet 21 tablets is that ok ?  DD

## 2018-07-13 ENCOUNTER — OFFICE VISIT (OUTPATIENT)
Dept: FAMILY MEDICINE CLINIC | Facility: HOSPITAL | Age: 68
End: 2018-07-13
Payer: MEDICARE

## 2018-07-13 VITALS
DIASTOLIC BLOOD PRESSURE: 72 MMHG | TEMPERATURE: 99.1 F | HEIGHT: 66 IN | WEIGHT: 201 LBS | BODY MASS INDEX: 32.3 KG/M2 | SYSTOLIC BLOOD PRESSURE: 118 MMHG | OXYGEN SATURATION: 95 % | HEART RATE: 87 BPM

## 2018-07-13 DIAGNOSIS — J20.9 ACUTE BRONCHITIS, UNSPECIFIED ORGANISM: ICD-10-CM

## 2018-07-13 DIAGNOSIS — J44.9 CHRONIC OBSTRUCTIVE PULMONARY DISEASE, UNSPECIFIED COPD TYPE (HCC): ICD-10-CM

## 2018-07-13 DIAGNOSIS — Z12.11 SCREENING FOR COLON CANCER: ICD-10-CM

## 2018-07-13 DIAGNOSIS — R92.2 DENSE BREAST TISSUE ON MAMMOGRAM: Primary | ICD-10-CM

## 2018-07-13 DIAGNOSIS — H66.92 LEFT OTITIS MEDIA, UNSPECIFIED OTITIS MEDIA TYPE: ICD-10-CM

## 2018-07-13 DIAGNOSIS — R05.9 COUGH: Primary | ICD-10-CM

## 2018-07-13 PROCEDURE — 99213 OFFICE O/P EST LOW 20 MIN: CPT | Performed by: INTERNAL MEDICINE

## 2018-07-13 RX ORDER — ALBUTEROL SULFATE 90 UG/1
2 AEROSOL, METERED RESPIRATORY (INHALATION) EVERY 6 HOURS PRN
Qty: 3 INHALER | Refills: 0 | Status: SHIPPED | OUTPATIENT
Start: 2018-07-13 | End: 2018-07-26 | Stop reason: SDUPTHER

## 2018-07-13 RX ORDER — ALBUTEROL SULFATE 90 UG/1
2 AEROSOL, METERED RESPIRATORY (INHALATION) EVERY 6 HOURS PRN
Qty: 1 INHALER | Refills: 0 | Status: SHIPPED | OUTPATIENT
Start: 2018-07-13 | End: 2020-07-15

## 2018-07-13 RX ORDER — CEFUROXIME AXETIL 250 MG/1
250 TABLET ORAL EVERY 12 HOURS SCHEDULED
Qty: 14 TABLET | Refills: 0 | Status: SHIPPED | OUTPATIENT
Start: 2018-07-13 | End: 2018-07-20

## 2018-07-13 RX ORDER — PROMETHAZINE HYDROCHLORIDE AND CODEINE PHOSPHATE 6.25; 1 MG/5ML; MG/5ML
5 SYRUP ORAL EVERY 4 HOURS PRN
Qty: 120 ML | Refills: 0 | Status: SHIPPED | OUTPATIENT
Start: 2018-07-13 | End: 2018-07-26 | Stop reason: HOSPADM

## 2018-07-13 NOTE — PROGRESS NOTES
Assessment/Plan:             Problem List Items Addressed This Visit     None            Subjective:      Patient ID: Efrain Delgado is a 78 y o  female    1  Started with sore throat 4 days ago -  was ill before this  Now increased cough  Had last episode in may- needed oral steroids- has appt with Pulmonary next month- Dr Fidel Cooper      Cough   Associated symptoms include postnasal drip  Pertinent negatives include no chills  The following portions of the patient's history were reviewed and updated as appropriate: allergies, current medications and problem list      Review of Systems   Constitutional: Negative for chills  Temp up slight;y today   HENT: Positive for postnasal drip and voice change  Negative for tinnitus  Respiratory: Positive for cough  Genitourinary: Negative for dysuria  All other systems reviewed and are negative  Objective:      Current Outpatient Prescriptions:     albuterol (2 5 mg/3 mL) 0 083 % nebulizer solution, Inhale, Disp: , Rfl:     albuterol (PROVENTIL HFA,VENTOLIN HFA) 90 mcg/act inhaler, Inhale 2 puffs every 6 (six) hours as needed for wheezing, Disp: 1 Inhaler, Rfl: 3    ergocalciferol (VITAMIN D2) 50,000 units, Take 1 capsule by mouth once a week, Disp: , Rfl:     fluticasone-salmeterol (ADVAIR) 250-50 mcg/dose inhaler, Inhale 1 puff every 12 (twelve) hours, Disp: , Rfl:     ipratropium-albuterol (DUONEB) 0 5-2 5 mg/3 mL, Take 3 mL by nebulization every 6 (six) hours as needed for wheezing, Disp: , Rfl:     loratadine (CLARITIN) 10 mg tablet, Take 10 mg by mouth daily  , Disp: , Rfl:     montelukast (SINGULAIR) 10 mg tablet, Take 1 tablet (10 mg total) by mouth daily at bedtime, Disp: 90 tablet, Rfl: 3    Multiple Vitamin (MULTI VITAMIN DAILY PO), Take 1 tablet by mouth daily, Disp: , Rfl:     Omeprazole 20 MG TBEC, Take 1 tablet (20 mg total) by mouth 2 (two) times a day, Disp: 180 tablet, Rfl: 1    promethazine-codeine (PHENERGAN WITH CODEINE) 6 25-10 mg/5 mL syrup, Take 5 mL by mouth every 4 (four) hours as needed for cough, Disp: 120 mL, Rfl: 0    Blood pressure 118/72, pulse 87, temperature 99 1 °F (37 3 °C), height 5' 6" (1 676 m), weight 91 2 kg (201 lb), SpO2 95 %  Physical Exam   Constitutional: She appears well-developed and well-nourished  HENT:   Right Ear: External ear normal    Mouth/Throat: Oropharynx is clear and moist    Moderate injection- no bulging   Eyes: EOM are normal  Pupils are equal, round, and reactive to light  Left eye exhibits no discharge  Neck: Normal range of motion  Neck supple  No JVD present  Cardiovascular: Normal rate and regular rhythm  No murmur heard  Pulmonary/Chest: Effort normal  She has wheezes  She has no rales  Some upper airway rhonchi   Abdominal: Soft  Bowel sounds are normal  She exhibits no distension  There is no tenderness  Musculoskeletal: She exhibits no edema  Nursing note and vitals reviewed

## 2018-07-16 ENCOUNTER — TELEPHONE (OUTPATIENT)
Dept: FAMILY MEDICINE CLINIC | Facility: HOSPITAL | Age: 68
End: 2018-07-16

## 2018-07-16 DIAGNOSIS — J40 BRONCHITIS: Primary | ICD-10-CM

## 2018-07-16 RX ORDER — METHYLPREDNISOLONE 4 MG/1
TABLET ORAL
Qty: 21 TABLET | Refills: 0 | OUTPATIENT
Start: 2018-07-16 | End: 2018-07-26 | Stop reason: HOSPADM

## 2018-07-26 ENCOUNTER — OFFICE VISIT (OUTPATIENT)
Dept: FAMILY MEDICINE CLINIC | Facility: HOSPITAL | Age: 68
End: 2018-07-26
Payer: MEDICARE

## 2018-07-26 VITALS
SYSTOLIC BLOOD PRESSURE: 122 MMHG | TEMPERATURE: 99.9 F | HEIGHT: 66 IN | DIASTOLIC BLOOD PRESSURE: 80 MMHG | HEART RATE: 77 BPM | BODY MASS INDEX: 32.3 KG/M2 | OXYGEN SATURATION: 98 % | WEIGHT: 201 LBS

## 2018-07-26 DIAGNOSIS — J44.1 COPD WITH ACUTE EXACERBATION (HCC): ICD-10-CM

## 2018-07-26 DIAGNOSIS — H92.02 LEFT EAR PAIN: ICD-10-CM

## 2018-07-26 DIAGNOSIS — J20.9 ACUTE BRONCHITIS, UNSPECIFIED ORGANISM: Primary | ICD-10-CM

## 2018-07-26 PROCEDURE — 99212 OFFICE O/P EST SF 10 MIN: CPT | Performed by: INTERNAL MEDICINE

## 2018-07-26 RX ORDER — LEVOFLOXACIN 500 MG/1
500 TABLET, FILM COATED ORAL DAILY
Qty: 10 TABLET | Refills: 0 | Status: SHIPPED | OUTPATIENT
Start: 2018-07-26 | End: 2018-08-02

## 2018-07-26 RX ORDER — PREDNISONE 10 MG/1
TABLET ORAL
Qty: 30 TABLET | Refills: 0 | Status: SHIPPED | OUTPATIENT
Start: 2018-07-26 | End: 2018-08-22 | Stop reason: ALTCHOICE

## 2018-07-26 NOTE — PROGRESS NOTES
Assessment/Plan:             Problem List Items Addressed This Visit        Respiratory    Acute bronchitis - Primary      Other Visit Diagnoses     Left ear pain                Subjective:      Patient ID: Antonio Bermuedz is a 80 y o  female    1  Cough-Was on ceftin for acute bronchitis then steroids- some recurrent cough but now with sore throat and left tongue feels swollen since last night- some barky cough again in late eveneing and when lying in bed  Has appt with Dr Morris Boykin next month  in hosptial inf eb then severe episode in may and again has difficulty clearing  2  Left ear pain -had some fluid on last exam there - now with pain  ? loss of hearing- misunderstood what her  said      Cough   Associated symptoms include ear pain and a sore throat  Pertinent negatives include no postnasal drip  The following portions of the patient's history were reviewed and updated as appropriate: allergies, current medications and problem list      Review of Systems   HENT: Positive for ear pain and sore throat  Negative for postnasal drip and sinus pressure  Respiratory: Positive for cough  All other systems reviewed and are negative  Objective:      Current Outpatient Prescriptions:     albuterol (2 5 mg/3 mL) 0 083 % nebulizer solution, Inhale, Disp: , Rfl:     albuterol (PROVENTIL HFA,VENTOLIN HFA) 90 mcg/act inhaler, Inhale 2 puffs every 6 (six) hours as needed for wheezing, Disp: 1 Inhaler, Rfl: 0    fluticasone-salmeterol (ADVAIR) 250-50 mcg/dose inhaler, Inhale 1 puff every 12 (twelve) hours, Disp: 3 Inhaler, Rfl: 3    ipratropium-albuterol (DUONEB) 0 5-2 5 mg/3 mL, Take 3 mL by nebulization every 6 (six) hours as needed for wheezing, Disp: , Rfl:     loratadine (CLARITIN) 10 mg tablet, Take 10 mg by mouth daily  , Disp: , Rfl:     montelukast (SINGULAIR) 10 mg tablet, Take 1 tablet (10 mg total) by mouth daily at bedtime, Disp: 90 tablet, Rfl: 3    Multiple Vitamin (MULTI VITAMIN DAILY PO), Take 1 tablet by mouth daily, Disp: , Rfl:     Omeprazole 20 MG TBEC, Take 1 tablet (20 mg total) by mouth 2 (two) times a day, Disp: 180 tablet, Rfl: 1    Blood pressure 122/80, pulse 77, temperature 99 9 °F (37 7 °C), height 5' 6" (1 676 m), weight 91 2 kg (201 lb), SpO2 98 %  Physical Exam   Constitutional: She appears well-developed and well-nourished  She appears distressed  Coughing episode   HENT:   Head: Normocephalic  Some lateral tongue swelling- no ulcers  Left ear with some redness- dull with no mobility   Eyes: Right eye exhibits no discharge  Left eye exhibits no discharge  Neck: No JVD present  Cardiovascular: Normal rate, regular rhythm and normal heart sounds  No murmur heard  Pulmonary/Chest: Effort normal and breath sounds normal    Upper airway rhonchi   Abdominal: Soft  Bowel sounds are normal  She exhibits no distension  There is no tenderness  Lymphadenopathy:     She has cervical adenopathy  Nursing note and vitals reviewed

## 2018-08-22 ENCOUNTER — OFFICE VISIT (OUTPATIENT)
Dept: PULMONOLOGY | Facility: HOSPITAL | Age: 68
End: 2018-08-22
Payer: MEDICARE

## 2018-08-22 VITALS
DIASTOLIC BLOOD PRESSURE: 94 MMHG | OXYGEN SATURATION: 95 % | WEIGHT: 206 LBS | SYSTOLIC BLOOD PRESSURE: 158 MMHG | HEIGHT: 65 IN | TEMPERATURE: 98.1 F | BODY MASS INDEX: 34.32 KG/M2 | HEART RATE: 78 BPM

## 2018-08-22 DIAGNOSIS — J45.40 ASTHMA, MODERATE PERSISTENT, WELL-CONTROLLED: Primary | ICD-10-CM

## 2018-08-22 DIAGNOSIS — J30.89 SEASONAL ALLERGIC RHINITIS DUE TO OTHER ALLERGIC TRIGGER: ICD-10-CM

## 2018-08-22 DIAGNOSIS — J44.9 CHRONIC OBSTRUCTIVE PULMONARY DISEASE, UNSPECIFIED COPD TYPE (HCC): ICD-10-CM

## 2018-08-22 DIAGNOSIS — R05.9 COUGH: ICD-10-CM

## 2018-08-22 DIAGNOSIS — J45.20 MILD INTERMITTENT ASTHMA WITHOUT COMPLICATION: ICD-10-CM

## 2018-08-22 PROBLEM — J20.9 ACUTE BRONCHITIS: Status: RESOLVED | Noted: 2018-05-27 | Resolved: 2018-08-22

## 2018-08-22 PROBLEM — J40 BRONCHITIS: Status: RESOLVED | Noted: 2018-01-26 | Resolved: 2018-08-22

## 2018-08-22 PROCEDURE — 99205 OFFICE O/P NEW HI 60 MIN: CPT | Performed by: INTERNAL MEDICINE

## 2018-08-22 RX ORDER — B-COMPLEX WITH VITAMIN C
TABLET ORAL
COMMUNITY
Start: 2008-10-22

## 2018-08-22 NOTE — ASSESSMENT & PLAN NOTE
Some of her cough at night may be related to postnasal drip  She has used Flonase in the past however I have asked her to simply start using nasal saline spray at night prior to going to bed to see if this helps her cough

## 2018-08-22 NOTE — PROGRESS NOTES
Assessment:    Intermittent asthma without complication  At this point the patient appears to have intermittent asthma  Her triggers seem to be allergies as well as upper respiratory tract infections  It does seem when she has about of asthma that it is quite severe even at times requiring hospitalization  I have asked her to get complete pulmonary function tests with methacholine to ensure this is the correct diagnosis  I have asked her to get allergy testing  She has had a chest x-ray which is normal   We will hold off on further imaging at this time  If there are any significant abnormalities on pulmonary function testing will consider CT scan of the chest at that time  I have asked to remain off Advair and till after PFTs  She can continue Singulair daily and Ventolin as needed  She should remain on omeprazole for heartburn    Allergic rhinitis  Some of her cough at night may be related to postnasal drip  She has used Flonase in the past however I have asked her to simply start using nasal saline spray at night prior to going to bed to see if this helps her cough  Plan:    Diagnoses and all orders for this visit:    Asthma, moderate persistent, well-controlled  -     [facility], Adult; Future  -     Methacholine challenge; Future    Cough  -     Ambulatory referral to Pulmonology    Chronic obstructive pulmonary disease, unspecified COPD type ([address] )  -     Ambulatory referral to Pulmonology    Mild intermittent asthma without complication    Seasonal allergic rhinitis due to other allergic trigger    Other orders  -     Calcium Carbonate-Vitamin D 600-200 MG-UNIT TABS; Take by mouth        Follow-up:  1 month      HPI:  The patient is a 25-year-old lifelong nonsmoker who has had increased bronchitis and 2 hospitalizations in the past 2 years  Dr Carina Ramsey referred her for further evaluation    She was first daignosed with lung disease 15-20 years ago - and has been told that she has both asthma and COPD    She has a chronic cough, throughout the day but increased at night, particularly when lying down  She has dyspnea with exercise or stairs  She has gained weight steadily over the past several years - at least 20 pounds in the past 10 years  She doesn't have wheezing very often - it ususally is only if she has signifciant exertion    Her last two hospitalizations she had the flu  She had some laryngeal spasms during that admissions  Fortunately she has not had any laryngeal spasm since that time  She uses Advair only when she is sick or with seasonal allergies, ventolin prn (only when sick)  She is on singulair daily    She has heartburn that is controlled on omeprazole    No Pets or birds    This summer she was sick in both May and July with upper respiratory tract infection/bronchitis as well as asthma exacerbations that required a course of antibiotics and 2 courses of prednisone each time  She states that she completed prednisone a couple weeks ago and has already noticed that the cough seems to be slowly creeping back in  On prednisone her cough resolves  Review of Systems   Constitutional: Negative for diaphoresis, fatigue, fever and unexpected weight change  HENT: Positive for postnasal drip  Negative for congestion, hearing loss, sore throat and voice change  Eyes: Negative for visual disturbance  Respiratory: Negative for apnea, cough, chest tightness, shortness of breath and wheezing  Cardiovascular: Positive for leg swelling  Negative for chest pain and palpitations  Gastrointestinal: Negative for abdominal distention, abdominal pain, blood in stool, constipation, diarrhea, nausea and vomiting  Endocrine: Negative for polyuria  Genitourinary: Negative for dysuria  Musculoskeletal: Negative for arthralgias  Skin: Negative for rash  Allergic/Immunologic: Negative for environmental allergies  Neurological: Negative for dizziness, tremors and numbness  Hematological: Does not bruise/bleed easily  Psychiatric/Behavioral: Negative for sleep disturbance  The patient is not nervous/anxious  Historical Information   Past Medical History:   Diagnosis Date    Asthma     Bronchiectasis ([address] )     Closed femur fracture ([facility] [address] )     and humerus resolved: 1/13/2017    COPD (chronic obstructive pulmonary disease) (HCC)     GERD (gastroesophageal reflux disease)     Influenza, pneumonia     resolved: 4/20/2017    Laryngeal spasm 2/22/2016    Leukocytosis 2/22/2016    Thyroid disorder     suppressed TSH    Traumatic arthropathy of left shoulder      Past Surgical History:   Procedure Laterality Date    CHOLECYSTECTOMY      COLON SURGERY      ERCP  06/2014    HYSTERECTOMY      JOINT REPLACEMENT  r hip replacement, fx r shoulder    ORIF HIP FRACTURE Right     VOLVULUS REDUCTION       Social History   History   Alcohol Use    Yes     Comment: occasional     History   Smoking Status    Never Smoker   Smokeless Tobacco    Never Used       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    Family History:   Family History   Problem Relation Age of Onset    Pulmonary embolism Daughter     Pancreatic cancer Mother     Heart disease Father     Heart attack Family     Substance Abuse Neg Hx         neg fam hx    Mental illness Neg Hx         neg fam hx       Medications: The patient's active and prehospital medications were reviewed  VITALS:  Vitals:    08/22/18 1443   BP: 158/94   BP Location: Left arm   Patient Position: Sitting   Pulse: 78   Temp: 98 1 °F (36 7 °C)   TempSrc: Tympanic   SpO2: 95%   Weight: 93 4 kg (206 lb)   Height: 5' 5" (1 651 m)       Physical Exam   Constitutional: She is oriented to person, place, and time  She appears well-developed and well-nourished  No distress  HENT:   Head: Normocephalic     Right Ear: External ear normal    Left Ear: External ear normal    Nose: Nose normal    Mouth/Throat: Oropharynx is clear and moist  No oropharyngeal exudate  Eyes: Conjunctivae are normal  Pupils are equal, round, and reactive to light  Right eye exhibits no discharge  Left eye exhibits no discharge  No scleral icterus  Neck: Normal range of motion  Neck supple  No JVD present  No tracheal deviation present  Cardiovascular: Normal rate, regular rhythm and normal heart sounds  Exam reveals no gallop and no friction rub  No murmur heard  Pulmonary/Chest: Effort normal and breath sounds normal  No respiratory distress  She has no wheezes  She has no rales  She exhibits no tenderness  Abdominal: Soft  Bowel sounds are normal  She exhibits no distension  There is no tenderness  There is no rebound and no guarding  Musculoskeletal: Normal range of motion  She exhibits no edema or deformity  Neurological: She is alert and oriented to person, place, and time  No cranial nerve deficit  Skin: Skin is warm and dry  No rash noted  She is not diaphoretic  No erythema  Psychiatric: She has a normal mood and affect  PFT results: Maybe several years ago - none on file    Diagnostic Data:  CBC:   Lab Results   Component Value Date    WBC 18 00 (H) 01/28/2018    HGB 11 8 01/28/2018    HCT 35 2 01/28/2018    MCV 94 01/28/2018     01/28/2018         CMP:   Lab Results   Component Value Date     01/28/2018    K 4 5 01/28/2018     01/28/2018    CO2 26 01/28/2018    ANIONGAP 7 01/28/2018    BUN 26 (H) 01/28/2018    CREATININE 0 73 01/28/2018    GLUCOSE 134 01/28/2018    GLUF 95 05/31/2017    CALCIUM 8 3 01/28/2018     (H) 01/27/2018     (H) 01/27/2018    ALKPHOS 88 01/27/2018    PROT 6 6 01/27/2018    BILITOT 0 30 01/27/2018    EGFR 86 01/28/2018     Imaging studies:  I have personally reviewed pertinent films in PACS  Chest x-ray 06/04/2018:  Mild left lower lobe atelectasis    No other significant infiltrates      Claudis Cooks, DO

## 2018-08-22 NOTE — ASSESSMENT & PLAN NOTE
At this point the patient appears to have intermittent asthma  Her triggers seem to be allergies as well as upper respiratory tract infections  It does seem when she has about of asthma that it is quite severe even at times requiring hospitalization  I have asked her to get complete pulmonary function tests with methacholine to ensure this is the correct diagnosis  I have asked her to get allergy testing  She has had a chest x-ray which is normal   We will hold off on further imaging at this time  If there are any significant abnormalities on pulmonary function testing will consider CT scan of the chest at that time  I have asked to remain off Advair and till after PFTs  She can continue Singulair daily and Ventolin as needed    She should remain on omeprazole for heartburn

## 2018-08-23 ENCOUNTER — HOSPITAL ENCOUNTER (OUTPATIENT)
Dept: MAMMOGRAPHY | Facility: CLINIC | Age: 68
Discharge: HOME | End: 2018-08-23
Payer: MEDICARE

## 2018-08-23 ENCOUNTER — APPOINTMENT (OUTPATIENT)
Dept: LAB | Facility: CLINIC | Age: 68
End: 2018-08-23
Payer: MEDICARE

## 2018-08-23 DIAGNOSIS — R63.5 WEIGHT GAIN: ICD-10-CM

## 2018-08-23 DIAGNOSIS — J45.40 ASTHMA, MODERATE PERSISTENT, WELL-CONTROLLED: ICD-10-CM

## 2018-08-23 DIAGNOSIS — R92.2 DENSE BREAST TISSUE ON MAMMOGRAM: ICD-10-CM

## 2018-08-23 LAB
ALBUMIN SERPL BCP-MCNC: 3.5 G/DL (ref 3.5–5)
ALP SERPL-CCNC: 109 U/L (ref 46–116)
ALT SERPL W P-5'-P-CCNC: 67 U/L (ref 12–78)
ANION GAP SERPL CALCULATED.3IONS-SCNC: 5 MMOL/L (ref 4–13)
AST SERPL W P-5'-P-CCNC: 34 U/L (ref 5–45)
BASOPHILS # BLD AUTO: 0.08 THOUSANDS/ΜL (ref 0–0.1)
BASOPHILS NFR BLD AUTO: 1 % (ref 0–1)
BILIRUB SERPL-MCNC: 0.89 MG/DL (ref 0.2–1)
BUN SERPL-MCNC: 24 MG/DL (ref 5–25)
CALCIUM SERPL-MCNC: 8.8 MG/DL (ref 8.3–10.1)
CHLORIDE SERPL-SCNC: 108 MMOL/L (ref 100–108)
CHOLEST SERPL-MCNC: 194 MG/DL (ref 50–200)
CO2 SERPL-SCNC: 28 MMOL/L (ref 21–32)
CREAT SERPL-MCNC: 0.91 MG/DL (ref 0.6–1.3)
EOSINOPHIL # BLD AUTO: 0.68 THOUSAND/ΜL (ref 0–0.61)
EOSINOPHIL NFR BLD AUTO: 10 % (ref 0–6)
ERYTHROCYTE [DISTWIDTH] IN BLOOD BY AUTOMATED COUNT: 12.8 % (ref 11.6–15.1)
GFR SERPL CREATININE-BSD FRML MDRD: 65 ML/MIN/1.73SQ M
GLUCOSE P FAST SERPL-MCNC: 86 MG/DL (ref 65–99)
HCT VFR BLD AUTO: 40.8 % (ref 34.8–46.1)
HDLC SERPL-MCNC: 51 MG/DL (ref 40–60)
HGB BLD-MCNC: 13.1 G/DL (ref 11.5–15.4)
IMM GRANULOCYTES # BLD AUTO: 0.03 THOUSAND/UL (ref 0–0.2)
IMM GRANULOCYTES NFR BLD AUTO: 0 % (ref 0–2)
LDLC SERPL CALC-MCNC: 128 MG/DL (ref 0–100)
LYMPHOCYTES # BLD AUTO: 1.48 THOUSANDS/ΜL (ref 0.6–4.47)
LYMPHOCYTES NFR BLD AUTO: 21 % (ref 14–44)
MCH RBC QN AUTO: 30.3 PG (ref 26.8–34.3)
MCHC RBC AUTO-ENTMCNC: 32.1 G/DL (ref 31.4–37.4)
MCV RBC AUTO: 94 FL (ref 82–98)
MONOCYTES # BLD AUTO: 0.61 THOUSAND/ΜL (ref 0.17–1.22)
MONOCYTES NFR BLD AUTO: 9 % (ref 4–12)
NEUTROPHILS # BLD AUTO: 4.07 THOUSANDS/ΜL (ref 1.85–7.62)
NEUTS SEG NFR BLD AUTO: 59 % (ref 43–75)
NRBC BLD AUTO-RTO: 0 /100 WBCS
PLATELET # BLD AUTO: 318 THOUSANDS/UL (ref 149–390)
PMV BLD AUTO: 9.7 FL (ref 8.9–12.7)
POTASSIUM SERPL-SCNC: 4.4 MMOL/L (ref 3.5–5.3)
PROT SERPL-MCNC: 7.1 G/DL (ref 6.4–8.2)
RBC # BLD AUTO: 4.32 MILLION/UL (ref 3.81–5.12)
SODIUM SERPL-SCNC: 141 MMOL/L (ref 136–145)
TRIGL SERPL-MCNC: 75 MG/DL
WBC # BLD AUTO: 6.95 THOUSAND/UL (ref 4.31–10.16)

## 2018-08-23 PROCEDURE — 80061 LIPID PANEL: CPT

## 2018-08-23 PROCEDURE — 77067 SCR MAMMO BI INCL CAD: CPT

## 2018-08-23 PROCEDURE — 80053 COMPREHEN METABOLIC PANEL: CPT

## 2018-08-23 PROCEDURE — 77063 BREAST TOMOSYNTHESIS BI: CPT

## 2018-08-23 PROCEDURE — 36415 COLL VENOUS BLD VENIPUNCTURE: CPT

## 2018-08-23 PROCEDURE — 86003 ALLG SPEC IGE CRUDE XTRC EA: CPT

## 2018-08-23 PROCEDURE — 85025 COMPLETE CBC W/AUTO DIFF WBC: CPT

## 2018-08-23 PROCEDURE — 82785 ASSAY OF IGE: CPT

## 2018-08-24 LAB
A ALTERNATA IGE QN: <0.1 KUA/I
A FUMIGATUS IGE QN: <0.1 KUA/I
ALLERGEN COMMENT: NORMAL
BERMUDA GRASS IGE QN: <0.1 KUA/I
BOXELDER IGE QN: <0.1 KUA/I
C HERBARUM IGE QN: <0.1 KUA/I
CAT DANDER IGE QN: <0.1 KUA/I
CMN PIGWEED IGE QN: <0.1 KUA/I
COMMON RAGWEED IGE QN: <0.1 KUA/I
COTTONWOOD IGE QN: <0.1 KUA/I
D FARINAE IGE QN: <0.1 KUA/I
D PTERONYSS IGE QN: <0.1 KUA/I
DOG DANDER IGE QN: <0.1 KUA/I
LONDON PLANE IGE QN: <0.1 KUA/I
MOUSE URINE PROT IGE QN: <0.1 KUA/I
MT JUNIPER IGE QN: <0.1 KUA/I
MUGWORT IGE QN: <0.1 KUA/I
P NOTATUM IGE QN: <0.1 KUA/I
ROACH IGE QN: <0.1 KUA/I
SHEEP SORREL IGE QN: <0.1 KUA/I
SILVER BIRCH IGE QN: <0.1 KUA/I
TIMOTHY IGE QN: <0.1 KUA/I
TOTAL IGE SMQN RAST: 6.37 KU/L (ref 0–113)
WALNUT IGE QN: <0.1 KUA/I
WHITE ASH IGE QN: <0.1 KUA/I
WHITE ELM IGE QN: <0.1 KUA/I
WHITE MULBERRY IGE QN: <0.1 KUA/I
WHITE OAK IGE QN: <0.1 KUA/I

## 2018-08-27 ENCOUNTER — HOSPITAL ENCOUNTER (OUTPATIENT)
Dept: PULMONOLOGY | Facility: HOSPITAL | Age: 68
Discharge: HOME | End: 2018-08-27
Attending: INTERNAL MEDICINE
Payer: MEDICARE

## 2018-08-27 ENCOUNTER — TELEPHONE (OUTPATIENT)
Dept: PULMONOLOGY | Facility: CLINIC | Age: 68
End: 2018-08-27

## 2018-08-27 DIAGNOSIS — J45.40 ASTHMA, MODERATE PERSISTENT, WELL-CONTROLLED: ICD-10-CM

## 2018-08-27 PROCEDURE — 94070 EVALUATION OF WHEEZING: CPT

## 2018-08-27 PROCEDURE — 94729 DIFFUSING CAPACITY: CPT

## 2018-08-27 PROCEDURE — 94726 PLETHYSMOGRAPHY LUNG VOLUMES: CPT

## 2018-08-27 PROCEDURE — 94761 N-INVAS EAR/PLS OXIMETRY MLT: CPT

## 2018-08-27 PROCEDURE — 94760 N-INVAS EAR/PLS OXIMETRY 1: CPT

## 2018-08-27 PROCEDURE — 94070 EVALUATION OF WHEEZING: CPT | Performed by: INTERNAL MEDICINE

## 2018-08-27 PROCEDURE — 94726 PLETHYSMOGRAPHY LUNG VOLUMES: CPT | Performed by: INTERNAL MEDICINE

## 2018-08-27 PROCEDURE — 94729 DIFFUSING CAPACITY: CPT | Performed by: INTERNAL MEDICINE

## 2018-08-27 RX ORDER — ALBUTEROL SULFATE 2.5 MG/3ML
2.5 SOLUTION RESPIRATORY (INHALATION) ONCE AS NEEDED
Status: COMPLETED | OUTPATIENT
Start: 2018-08-27 | End: 2018-08-27

## 2018-08-27 RX ORDER — SODIUM CHLORIDE FOR INHALATION 0.9 %
VIAL, NEBULIZER (ML) INHALATION
Status: DISPENSED
Start: 2018-08-27 | End: 2018-08-27

## 2018-08-27 RX ADMIN — ALBUTEROL SULFATE 2.5 MG: 2.5 SOLUTION RESPIRATORY (INHALATION) at 11:30

## 2018-08-27 NOTE — TELEPHONE ENCOUNTER
LM for pt to call back and schedule a f/u appt with Dr Anju Obrien [facility] on 9/25 or 9/26 in [facility]  This is ok with Dr Anju Obrien

## 2018-09-07 ENCOUNTER — HOSPITAL ENCOUNTER (OUTPATIENT)
Dept: MAMMOGRAPHY | Facility: CLINIC | Age: 68
Discharge: HOME | End: 2018-09-07
Payer: MEDICARE

## 2018-09-07 ENCOUNTER — HOSPITAL ENCOUNTER (OUTPATIENT)
Dept: ULTRASOUND IMAGING | Facility: CLINIC | Age: 68
Discharge: HOME | End: 2018-09-07
Payer: MEDICARE

## 2018-09-07 DIAGNOSIS — R92.8 ABNORMAL MAMMOGRAM: ICD-10-CM

## 2018-09-07 PROCEDURE — 76642 ULTRASOUND BREAST LIMITED: CPT

## 2018-09-07 PROCEDURE — 77065 DX MAMMO INCL CAD UNI: CPT

## 2018-09-07 PROCEDURE — G0279 TOMOSYNTHESIS, MAMMO: HCPCS

## 2018-09-26 ENCOUNTER — OFFICE VISIT (OUTPATIENT)
Dept: PULMONOLOGY | Facility: HOSPITAL | Age: 68
End: 2018-09-26
Payer: MEDICARE

## 2018-09-26 VITALS
TEMPERATURE: 98.5 F | WEIGHT: 203 LBS | DIASTOLIC BLOOD PRESSURE: 90 MMHG | OXYGEN SATURATION: 95 % | SYSTOLIC BLOOD PRESSURE: 150 MMHG | HEART RATE: 80 BPM | HEIGHT: 65 IN | BODY MASS INDEX: 33.82 KG/M2

## 2018-09-26 DIAGNOSIS — J30.89 SEASONAL ALLERGIC RHINITIS DUE TO OTHER ALLERGIC TRIGGER: ICD-10-CM

## 2018-09-26 DIAGNOSIS — J45.991 COUGH VARIANT ASTHMA: Primary | ICD-10-CM

## 2018-09-26 PROCEDURE — 90662 IIV NO PRSV INCREASED AG IM: CPT

## 2018-09-26 PROCEDURE — 99214 OFFICE O/P EST MOD 30 MIN: CPT | Performed by: INTERNAL MEDICINE

## 2018-09-26 PROCEDURE — G0008 ADMIN INFLUENZA VIRUS VAC: HCPCS

## 2018-09-26 RX ORDER — FLUTICASONE FUROATE AND VILANTEROL 200; 25 UG/1; UG/1
1 POWDER RESPIRATORY (INHALATION) DAILY
Qty: 1 INHALER | Refills: 6 | Status: SHIPPED | OUTPATIENT
Start: 2018-09-26 | End: 2019-01-29

## 2018-09-26 NOTE — PROGRESS NOTES
Assessment:    Cough variant asthma  Despite having a true positive methacholine challenge, I believe that the patient has a form of cough variant asthma  It seems to be triggered by allergens despite the fact that her IgE level is normal and she had a negative RAST panel  Interestingly she does have a mild increase in eosinophil count  Will get a CT scan of the chest to assess for her history of bronchiectasis   This will also assess for any infiltrates that may be related to elevated eosinophils  I have asked her to remain on Singulair daily and to add Breo 1 puff once daily  She should increase her albuterol use as this seems to improve her symptoms  She should continue treatment for reflux disease as well as allergic rhinitis  I have asked her to be evaluated by an allergist    We discussed that she has had a cough for several years and therefore there may be a neurogenic component  We will start with treatment of allergy/inflammation and then based off degree of cough do further workup treatment  Allergic rhinitis  Continue Singulair as well as p r n  nasal spray      Plan:    Diagnoses and all orders for this visit:    Cough variant asthma  -     Ambulatory referral to Allergy; Future  -     fluticasone-vilanterol (BREO ELLIPTA) 200-25 MCG/INH inhaler; Inhale 1 puff daily Rinse mouth after use  -     CT chest high resolution; Future  -     influenza vaccine, [id number], high-dose, PF 0 5 mL, for patients 65 yr+ (FLUZONE HIGH-DOSE)    Seasonal allergic rhinitis due to other allergic trigger  -     Ambulatory referral to Allergy; Future  -     fluticasone-vilanterol (BREO ELLIPTA) 200-25 MCG/INH inhaler; Inhale 1 puff daily Rinse mouth after use  -     CT chest high resolution; Future  -     influenza vaccine, [id number], high-dose, PF 0 5 mL, for patients 65 yr+ (FLUZONE HIGH-DOSE)        Follow-up:   3-4 months      HPI:  Overall she states she is doing about the same    Cough remains her main symptom and she coughs throughout the day and the night  It is a nonproductive cough  Certain things that trigger cough including eating bread  She does not feel that she has reflux  As long as she remains on Singulair she does not have significant nasal congestion or postnasal drip  She has remained off Advair since our last visit  Occasionally she will use an albuterol rescue inhaler if the cough becomes severe and resultant chest tightness  She states that the albuterol will help  In the past she has used Advair and Symbicort without significant improvements  She continues to have minimal PND      Review of Systems   Constitutional: Negative for activity change and unexpected weight change  HENT: Positive for congestion  Respiratory: Positive for cough  Skin: Negative for rash  Allergic/Immunologic: Negative for food allergies  Psychiatric/Behavioral: The patient is nervous/anxious  The following portions of the patient's history were reviewed and updated as appropriate: allergies, current medications, past family history, past medical history, past social history, past surgical history and problem list     VITALS:  Vitals:    09/26/18 1556   BP: 150/90   BP Location: Left arm   Patient Position: Sitting   Cuff Size: Standard   Pulse: 80   Temp: 98 5 °F (36 9 °C)   TempSrc: Tympanic   SpO2: 95%   Weight: 92 1 kg (203 lb)   Height: 5' 5" (1 651 m)       Physical Exam  General:  Patient is awake, alert, non-toxic and in no acute respiratory distress  Neck: No JVD  CV:  Regular, +S1 and S2, No murmurs, gallops or rubs appreciated  Lungs:   Clear to auscultation bilateral without wheeze, rales or rhonchi  Abdomen: Soft, +BS, Non-tender, non-distended  Extremities: No clubbing, cyanosis or edema  Neuro: No focal deficits        Diagnostic Testing:    PFTs:  8/27/18:  *Results:  Patient gave good effort and cooperation  The testing met ATS Standards for Acceptability and Repeatability  Baseline oxygen saturation was 97% on room air with a heart rate of 89      Spirometry:  FEV1/FVC Ratio is 83  FEV1 is 2  12L which is 83% predicted  FVC is 2 56L which is 76% predicted  There is no significant postbronchodilator improvement in FEV1 or FVC      Flow volume loop:   normal     Lung volumes: Total lung capacity is 5 35L which is 108% predicted  Residual volume is 128% predicted      Methacholine Challenge:   the patient was able to tolerate up to the maximum 16 milligram/milliliter dose  She had increasing difficulty with cough and reported tightness  At that dose, FEV1 decreased to 1 78 L a 16% decrease and FVC decreased to 2 07 L and 18% decrease      IMPRESSION:    1  No large airway obstruction or bronchodilator responsiveness   2  Decreased forced vital capacity with elevated total lung capacity and residual volume consistent with mild air trapping   3    Negative methacholine challenge with normal bronchial responsiveness    CBC:  Lab Results   Component Value Date    WBC 6 95 08/23/2018    HGB 13 1 08/23/2018    HCT 40 8 08/23/2018    MCV 94 08/23/2018     08/23/2018         BMP:   Lab Results   Component Value Date     08/23/2018    K 4 4 08/23/2018     08/23/2018    CO2 28 08/23/2018    ANIONGAP 8 10/10/2014    BUN 24 08/23/2018    CREATININE 0 91 08/23/2018    GLUCOSE 95 10/10/2014    GLUF 86 08/23/2018    CALCIUM 8 8 08/23/2018    AST 34 08/23/2018    ALT 67 08/23/2018    ALKPHOS 109 08/23/2018    PROT 6 7 10/10/2014    BILITOT 0 7 10/10/2014    EGFR 65 08/23/2018       Armen Drop, DO

## 2018-09-26 NOTE — ASSESSMENT & PLAN NOTE
Despite having a true positive methacholine challenge, I believe that the patient has a form of cough variant asthma  It seems to be triggered by allergens despite the fact that her IgE level is normal and she had a negative RAST panel  Interestingly she does have a mild increase in eosinophil count  Will get a CT scan of the chest to assess for her history of bronchiectasis   This will also assess for any infiltrates that may be related to elevated eosinophils  I have asked her to remain on Singulair daily and to add Breo 1 puff once daily  She should increase her albuterol use as this seems to improve her symptoms  She should continue treatment for reflux disease as well as allergic rhinitis  I have asked her to be evaluated by an allergist    We discussed that she has had a cough for several years and therefore there may be a neurogenic component  We will start with treatment of allergy/inflammation and then based off degree of cough do further workup treatment

## 2018-09-26 NOTE — PATIENT INSTRUCTIONS
Stop Advair   start Breo 1 puff once daily -rinse mouth out after use     increased albuterol use to at least once daily     continue treatment for reflux disease and rhinitis     get CT scan and see allergist

## 2018-10-03 ENCOUNTER — HOSPITAL ENCOUNTER (OUTPATIENT)
Dept: CT IMAGING | Facility: HOSPITAL | Age: 68
Discharge: HOME | End: 2018-10-03
Attending: INTERNAL MEDICINE
Payer: MEDICARE

## 2018-10-03 DIAGNOSIS — J45.991 COUGH VARIANT ASTHMA: ICD-10-CM

## 2018-10-03 DIAGNOSIS — J30.89 SEASONAL ALLERGIC RHINITIS DUE TO OTHER ALLERGIC TRIGGER: ICD-10-CM

## 2018-10-03 PROCEDURE — 71250 CT THORAX DX C-: CPT

## 2018-10-31 DIAGNOSIS — K21.9 GASTROESOPHAGEAL REFLUX DISEASE, ESOPHAGITIS PRESENCE NOT SPECIFIED: ICD-10-CM

## 2018-10-31 RX ORDER — OMEPRAZOLE 20 MG/1
CAPSULE, DELAYED RELEASE ORAL
Qty: 180 CAPSULE | Refills: 0 | Status: SHIPPED | OUTPATIENT
Start: 2018-10-31 | End: 2019-02-18 | Stop reason: SDUPTHER

## 2018-11-13 ENCOUNTER — OFFICE VISIT (OUTPATIENT)
Dept: FAMILY MEDICINE CLINIC | Facility: HOSPITAL | Age: 68
End: 2018-11-13
Payer: MEDICARE

## 2018-11-13 VITALS
WEIGHT: 198 LBS | BODY MASS INDEX: 32.99 KG/M2 | SYSTOLIC BLOOD PRESSURE: 128 MMHG | RESPIRATION RATE: 16 BRPM | HEIGHT: 65 IN | HEART RATE: 75 BPM | DIASTOLIC BLOOD PRESSURE: 74 MMHG

## 2018-11-13 DIAGNOSIS — J30.89 SEASONAL ALLERGIC RHINITIS DUE TO OTHER ALLERGIC TRIGGER: ICD-10-CM

## 2018-11-13 DIAGNOSIS — Z23 NEED FOR SHINGLES VACCINE: ICD-10-CM

## 2018-11-13 DIAGNOSIS — J45.991 COUGH VARIANT ASTHMA: Primary | ICD-10-CM

## 2018-11-13 DIAGNOSIS — E78.2 MIXED HYPERLIPIDEMIA: ICD-10-CM

## 2018-11-13 DIAGNOSIS — K21.9 GASTROESOPHAGEAL REFLUX DISEASE WITHOUT ESOPHAGITIS: ICD-10-CM

## 2018-11-13 DIAGNOSIS — IMO0002 ANTIBIOTIC PROPHYLAXIS FOR DENTAL PROCEDURE INDICATED DUE TO PRIOR JOINT REPLACEMENT: ICD-10-CM

## 2018-11-13 PROCEDURE — 99214 OFFICE O/P EST MOD 30 MIN: CPT | Performed by: INTERNAL MEDICINE

## 2018-11-13 RX ORDER — AMOXICILLIN 500 MG/1
CAPSULE ORAL
Qty: 12 CAPSULE | Refills: 1 | Status: SHIPPED | OUTPATIENT
Start: 2018-11-13 | End: 2019-08-09 | Stop reason: SDUPTHER

## 2018-11-13 NOTE — PROGRESS NOTES
Assessment/Plan:             Problem List Items Addressed This Visit        Digestive    GERD (gastroesophageal reflux disease)     On omeprazole- on for years- no symptoms  Discussed trying to cut down on medication- will try cutting down to once daily- may consider decreasing her to ranitidine  Respiratory    Cough variant asthma     Improving with Dr Tosha Steele         Allergic rhinitis     Has appt in [place] with Dr Dionne Willson allergy in [place] for evaluation           Other Visit Diagnoses     Need for shingles vaccine    -  Primary    Relevant Medications    Zoster Vac Recomb Adjuvanted (SHINGRIX) 50 MCG SUSR            Subjective:      Patient ID: Servando May is a 80 y o  female    See porch notes        The following portions of the patient's history were reviewed and updated as appropriate: allergies, current medications and problem list      Review of Systems   Respiratory: Positive for cough  Negative for shortness of breath and wheezing  All other systems reviewed and are negative  Objective:      Current Outpatient Prescriptions:     albuterol (2 5 mg/3 mL) 0 083 % nebulizer solution, Inhale, Disp: , Rfl:     albuterol (PROVENTIL HFA,VENTOLIN HFA) 90 mcg/act inhaler, Inhale 2 puffs every 6 (six) hours as needed for wheezing, Disp: 1 Inhaler, Rfl: 0    Calcium Carbonate-Vitamin D 600-200 MG-UNIT TABS, Take by mouth, Disp: , Rfl:     fluticasone-vilanterol (BREO ELLIPTA) 200-25 MCG/INH inhaler, Inhale 1 puff daily Rinse mouth after use , Disp: 1 Inhaler, Rfl: 6    ipratropium-albuterol (DUONEB) 0 5-2 5 mg/3 mL, Take 3 mL by nebulization every 6 (six) hours as needed for wheezing, Disp: , Rfl:     loratadine (CLARITIN) 10 mg tablet, Take 10 mg by mouth daily  , Disp: , Rfl:     montelukast (SINGULAIR) 10 mg tablet, Take 1 tablet (10 mg total) by mouth daily at bedtime, Disp: 90 tablet, Rfl: 3    Multiple Vitamin (MULTI VITAMIN DAILY PO), Take 1 tablet by mouth daily, Disp: , Rfl:     omeprazole (PriLOSEC) 20 mg delayed release capsule, Take 1 capsule by mouth twice a day, Disp: 180 capsule, Rfl: 0    Zoster Vac Recomb Adjuvanted (SHINGRIX) 50 MCG SUSR, Inject 1 Dose into a muscle every 6 (six) months for 2 doses, Disp: 1 each, Rfl: 0    Blood pressure 128/74, pulse 75, resp  rate 16, height 5' 5" (1 651 m), weight 89 8 kg (198 lb)  Physical Exam   Constitutional: She is oriented to person, place, and time  She appears well-developed and well-nourished  No distress  Coughing episode   HENT:   Head: Normocephalic  Some lateral tongue swelling- no ulcers  Left ear with some redness- dull with no mobility   Eyes: Right eye exhibits no discharge  Left eye exhibits no discharge  Neck: No JVD present  No thyromegaly present  Cardiovascular: Normal rate, regular rhythm and normal heart sounds  No murmur heard  Pulmonary/Chest: Effort normal and breath sounds normal    Upper airway rhonchi   Abdominal: Soft  Bowel sounds are normal  She exhibits no distension  There is no tenderness  Musculoskeletal: She exhibits no edema, tenderness or deformity  Lymphadenopathy:     She has no cervical adenopathy  Neurological: She is alert and oriented to person, place, and time  No cranial nerve deficit  Coordination normal    Skin: Skin is warm and dry  No rash noted  No erythema  Psychiatric: She has a normal mood and affect  Her behavior is normal    Nursing note and vitals reviewed

## 2018-11-13 NOTE — ASSESSMENT & PLAN NOTE
On omeprazole- on for years- no symptoms  Discussed trying to cut down on medication- will try cutting down to once daily- may consider decreasing her to ranitidine

## 2018-12-21 ENCOUNTER — TRANSCRIBE ORDERS (OUTPATIENT)
Dept: REGISTRATION | Age: 68
End: 2018-12-21

## 2018-12-21 ENCOUNTER — HOSPITAL ENCOUNTER (OUTPATIENT)
Dept: RADIOLOGY | Age: 68
Discharge: HOME | End: 2018-12-21
Attending: ORTHOPAEDIC SURGERY
Payer: MEDICARE

## 2018-12-21 DIAGNOSIS — M25.552 PAIN IN LEFT HIP: ICD-10-CM

## 2018-12-21 DIAGNOSIS — M25.552 PAIN IN LEFT HIP: Primary | ICD-10-CM

## 2018-12-21 PROCEDURE — 73502 X-RAY EXAM HIP UNI 2-3 VIEWS: CPT | Mod: LT

## 2019-01-29 ENCOUNTER — OFFICE VISIT (OUTPATIENT)
Dept: PULMONOLOGY | Facility: HOSPITAL | Age: 69
End: 2019-01-29
Payer: MEDICARE

## 2019-01-29 ENCOUNTER — TELEPHONE (OUTPATIENT)
Dept: PULMONOLOGY | Facility: HOSPITAL | Age: 69
End: 2019-01-29

## 2019-01-29 VITALS
WEIGHT: 198 LBS | DIASTOLIC BLOOD PRESSURE: 90 MMHG | TEMPERATURE: 97.8 F | HEIGHT: 65 IN | BODY MASS INDEX: 32.99 KG/M2 | SYSTOLIC BLOOD PRESSURE: 140 MMHG | OXYGEN SATURATION: 96 % | HEART RATE: 68 BPM

## 2019-01-29 DIAGNOSIS — J30.89 SEASONAL ALLERGIC RHINITIS DUE TO OTHER ALLERGIC TRIGGER: ICD-10-CM

## 2019-01-29 DIAGNOSIS — J45.991 COUGH VARIANT ASTHMA: Primary | ICD-10-CM

## 2019-01-29 DIAGNOSIS — K21.9 GASTROESOPHAGEAL REFLUX DISEASE WITHOUT ESOPHAGITIS: ICD-10-CM

## 2019-01-29 PROCEDURE — 99214 OFFICE O/P EST MOD 30 MIN: CPT | Performed by: INTERNAL MEDICINE

## 2019-01-29 RX ORDER — FLUTICASONE FUROATE AND VILANTEROL 100; 25 UG/1; UG/1
1 POWDER RESPIRATORY (INHALATION) DAILY
Qty: 1 INHALER | Refills: 6 | Status: SHIPPED | OUTPATIENT
Start: 2019-01-29 | End: 2020-02-24 | Stop reason: ALTCHOICE

## 2019-01-29 NOTE — PATIENT INSTRUCTIONS
At next prescription, decreased Breo to 100 mcg dose  After 4-6 weeks if cough remains controlled, stop Breo  If at any point cough increases, resume Breo

## 2019-01-29 NOTE — ASSESSMENT & PLAN NOTE
Continue Singulair daily  In the past she has tried to stop this medication but had resurgence of rhinitis symptoms

## 2019-01-29 NOTE — ASSESSMENT & PLAN NOTE
She is clearly doing better overall with cough  This is likely because she has had better control of her reflux disease  We will attempt to wean off of Breo  I have asked her to decrease to the 100 mcg daily dose on her next refill  If she remains under good control for 4-6 weeks, will then stop Breo  If she has any recurrence of symptoms she will go back on the [place]  She can continue to use albuterol p r n  [place] She is up-to-date on influenza and pneumonia immunizations  Will need repeat Pneumovax after May 2019

## 2019-01-29 NOTE — PROGRESS NOTES
Assessment:    GERD (gastroesophageal reflux disease)  She will continue with current diet/medications as recommended by allergist   Her cough seems to have improved however she has some intermittent breakthrough symptoms of reflux  Allergic rhinitis  Continue Singulair daily  In the past she has tried to stop this medication but had resurgence of rhinitis symptoms  Cough variant asthma  She is clearly doing better overall with cough  This is likely because she has had better control of her reflux disease  We will attempt to wean off of Breo  I have asked her to decrease to the 100 mcg daily dose on her next refill  If she remains under good control for 4-6 weeks, will then stop Breo  If she has any recurrence of symptoms she will go back on the [place]  She can continue to use albuterol filemon Wagner She is up-to-date on influenza and pneumonia immunizations  Will need repeat Pneumovax after May 2019  Plan:    Diagnoses and all orders for this visit:    Cough variant asthma  -     fluticasone-vilanterol (BREO ELLIPTA) 100-25 mcg/inh inhaler; Inhale 1 puff daily    Gastroesophageal reflux disease without esophagitis    Seasonal allergic rhinitis due to other allergic trigger        Follow-up:   6 months      HPI:  She was seen by allergy and thought it was LPR  She is following a diet for low acid  Her cough has improved - particularly at night    She is using a new pillow with an incline and she is having less snoring per her  however it is bothering her hips  Still using Breo once daily  She isn't using albuterol    She does not have any significant shortness of breath at rest or with exertion  She continues to have intermittent cough  She continues to have intermittent bouts of reflux  Review of Systems   Constitutional: Negative for activity change  HENT: Negative for congestion and postnasal drip  Respiratory: Positive for cough  Negative for shortness of breath  Cardiovascular: Negative for leg swelling  Musculoskeletal: Positive for arthralgias  Negative for gait problem  Skin: Negative for rash         The following portions of the patient's history were reviewed and updated as appropriate: allergies, current medications, past family history, past medical history, past social history, past surgical history and problem list     VITALS:  Vitals:    01/29/19 1229   BP: 140/90   BP Location: Left arm   Patient Position: Sitting   Cuff Size: Standard   Pulse: 68   Temp: 97 8 °F (36 6 °C)   TempSrc: Tympanic   SpO2: 96%   Weight: 89 8 kg (198 lb)   Height: 5' 5" (1 651 m)       Physical Exam  General:  Patient is awake, alert, non-toxic and in no acute respiratory distress  Neck: No JVD  CV:  Regular, +S1 and S2, No murmurs, gallops or rubs appreciated  Lungs:   Clear to auscultation bilateral without wheeze, rales or rhonchi  Abdomen: Soft, +BS, Non-tender, non-distended  Extremities: No clubbing, cyanosis or edema  Neuro: No focal deficits        Diagnostic Testing:    CBC:  Lab Results   Component Value Date    WBC 6 95 08/23/2018    HGB 13 1 08/23/2018    HCT 40 8 08/23/2018    MCV 94 08/23/2018     08/23/2018         BMP:   Lab Results   Component Value Date     10/10/2014    K 4 4 08/23/2018     08/23/2018    CO2 28 08/23/2018    ANIONGAP 8 10/10/2014    BUN 24 08/23/2018    CREATININE 0 91 08/23/2018    GLUCOSE 95 10/10/2014    GLUF 86 08/23/2018    CALCIUM 8 8 08/23/2018    AST 34 08/23/2018    ALT 67 08/23/2018    ALKPHOS 109 08/23/2018    PROT 6 7 10/10/2014    BILITOT 0 7 10/10/2014    EGFR 65 08/23/2018         Imaging studies:  I have personally reviewed pertinent films in PACS  CT chest October 2018:  Scarring left lower lobe, mild bronchiectasis particularly right lower lobe, no interstitial lung disease      Eh Kate DO

## 2019-01-29 NOTE — ASSESSMENT & PLAN NOTE
She will continue with current diet/medications as recommended by allergist   Her cough seems to have improved however she has some intermittent breakthrough symptoms of reflux

## 2019-02-06 DIAGNOSIS — J45.21 MILD INTERMITTENT ASTHMA WITH ACUTE EXACERBATION: ICD-10-CM

## 2019-02-06 RX ORDER — MONTELUKAST SODIUM 10 MG/1
TABLET ORAL
Qty: 90 TABLET | Refills: 3 | Status: SHIPPED | OUTPATIENT
Start: 2019-02-06 | End: 2020-01-28 | Stop reason: SDUPTHER

## 2019-02-18 DIAGNOSIS — K21.9 GASTROESOPHAGEAL REFLUX DISEASE, ESOPHAGITIS PRESENCE NOT SPECIFIED: ICD-10-CM

## 2019-02-19 RX ORDER — OMEPRAZOLE 20 MG/1
CAPSULE, DELAYED RELEASE ORAL
Qty: 180 CAPSULE | Refills: 1 | Status: SHIPPED | OUTPATIENT
Start: 2019-02-19 | End: 2019-08-09 | Stop reason: SDUPTHER

## 2019-04-23 ENCOUNTER — APPOINTMENT (OUTPATIENT)
Dept: LAB | Facility: HOSPITAL | Age: 69
End: 2019-04-23
Attending: INTERNAL MEDICINE
Payer: MEDICARE

## 2019-04-23 DIAGNOSIS — E78.2 MIXED HYPERLIPIDEMIA: ICD-10-CM

## 2019-04-23 LAB
CHOLEST SERPL-MCNC: 194 MG/DL (ref 50–200)
HDLC SERPL-MCNC: 51 MG/DL (ref 40–60)
LDLC SERPL CALC-MCNC: 130 MG/DL (ref 0–100)
TRIGL SERPL-MCNC: 67 MG/DL

## 2019-04-23 PROCEDURE — 80061 LIPID PANEL: CPT

## 2019-04-23 PROCEDURE — 36415 COLL VENOUS BLD VENIPUNCTURE: CPT

## 2019-04-29 ENCOUNTER — OFFICE VISIT (OUTPATIENT)
Dept: FAMILY MEDICINE CLINIC | Facility: HOSPITAL | Age: 69
End: 2019-04-29
Payer: MEDICARE

## 2019-04-29 VITALS
WEIGHT: 197 LBS | RESPIRATION RATE: 16 BRPM | DIASTOLIC BLOOD PRESSURE: 86 MMHG | HEIGHT: 65 IN | HEART RATE: 72 BPM | TEMPERATURE: 99 F | SYSTOLIC BLOOD PRESSURE: 140 MMHG | BODY MASS INDEX: 32.82 KG/M2

## 2019-04-29 DIAGNOSIS — J45.909 ASTHMA: ICD-10-CM

## 2019-04-29 DIAGNOSIS — J32.9 SINUSITIS, UNSPECIFIED CHRONICITY, UNSPECIFIED LOCATION: ICD-10-CM

## 2019-04-29 DIAGNOSIS — J44.9 CHRONIC OBSTRUCTIVE PULMONARY DISEASE, UNSPECIFIED COPD TYPE (HCC): ICD-10-CM

## 2019-04-29 DIAGNOSIS — R05.9 COUGH: ICD-10-CM

## 2019-04-29 PROCEDURE — 99213 OFFICE O/P EST LOW 20 MIN: CPT | Performed by: PHYSICIAN ASSISTANT

## 2019-04-29 RX ORDER — CEFUROXIME AXETIL 250 MG/1
250 TABLET ORAL EVERY 12 HOURS SCHEDULED
Qty: 20 TABLET | Refills: 0 | Status: SHIPPED | OUTPATIENT
Start: 2019-04-29 | End: 2019-05-09

## 2019-04-29 RX ORDER — PREDNISONE 10 MG/1
TABLET ORAL
Qty: 14 TABLET | Refills: 3 | Status: SHIPPED | OUTPATIENT
Start: 2019-04-29 | End: 2019-05-01

## 2019-05-01 ENCOUNTER — OFFICE VISIT (OUTPATIENT)
Dept: FAMILY MEDICINE CLINIC | Facility: HOSPITAL | Age: 69
End: 2019-05-01
Payer: MEDICARE

## 2019-05-01 VITALS
BODY MASS INDEX: 32.82 KG/M2 | OXYGEN SATURATION: 97 % | SYSTOLIC BLOOD PRESSURE: 138 MMHG | DIASTOLIC BLOOD PRESSURE: 80 MMHG | HEIGHT: 65 IN | HEART RATE: 86 BPM | WEIGHT: 197 LBS | TEMPERATURE: 99.7 F

## 2019-05-01 DIAGNOSIS — J45.991 COUGH VARIANT ASTHMA: Primary | ICD-10-CM

## 2019-05-01 DIAGNOSIS — J01.41 ACUTE RECURRENT PANSINUSITIS: ICD-10-CM

## 2019-05-01 DIAGNOSIS — J40 TRACHEOBRONCHITIS: ICD-10-CM

## 2019-05-01 PROCEDURE — 99213 OFFICE O/P EST LOW 20 MIN: CPT | Performed by: INTERNAL MEDICINE

## 2019-05-01 RX ORDER — FLUTICASONE PROPIONATE 50 MCG
2 SPRAY, SUSPENSION (ML) NASAL DAILY
Qty: 1 BOTTLE | Refills: 5 | Status: SHIPPED | OUTPATIENT
Start: 2019-05-01 | End: 2019-11-01 | Stop reason: ALTCHOICE

## 2019-05-01 RX ORDER — PREDNISONE 10 MG/1
TABLET ORAL
Qty: 30 TABLET | Refills: 0 | Status: SHIPPED | OUTPATIENT
Start: 2019-05-01 | End: 2019-11-01 | Stop reason: ALTCHOICE

## 2019-05-01 RX ORDER — HYDROCODONE POLISTIREX AND CHLORPHENIRAMINE POLISTIREX 10; 8 MG/5ML; MG/5ML
5 SUSPENSION, EXTENDED RELEASE ORAL EVERY 12 HOURS PRN
Qty: 120 ML | Refills: 0 | Status: SHIPPED | OUTPATIENT
Start: 2019-05-01 | End: 2019-11-01 | Stop reason: ALTCHOICE

## 2019-08-09 DIAGNOSIS — Z96.649 HISTORY OF HIP REPLACEMENT, UNSPECIFIED LATERALITY: ICD-10-CM

## 2019-08-09 DIAGNOSIS — K21.9 GASTROESOPHAGEAL REFLUX DISEASE, ESOPHAGITIS PRESENCE NOT SPECIFIED: ICD-10-CM

## 2019-08-09 RX ORDER — AMOXICILLIN 500 MG/1
CAPSULE ORAL
Qty: 12 CAPSULE | Refills: 1 | Status: SHIPPED | OUTPATIENT
Start: 2019-08-09 | End: 2020-02-24 | Stop reason: ALTCHOICE

## 2019-08-09 RX ORDER — OMEPRAZOLE 20 MG/1
20 CAPSULE, DELAYED RELEASE ORAL 2 TIMES DAILY
Qty: 180 CAPSULE | Refills: 1 | Status: SHIPPED | OUTPATIENT
Start: 2019-08-09 | End: 2020-02-21

## 2019-09-09 ENCOUNTER — TRANSCRIBE ORDERS (OUTPATIENT)
Dept: PHYSICAL THERAPY | Facility: CLINIC | Age: 69
End: 2019-09-09

## 2019-09-09 ENCOUNTER — EVALUATION (OUTPATIENT)
Dept: PHYSICAL THERAPY | Facility: CLINIC | Age: 69
End: 2019-09-09
Payer: MEDICARE

## 2019-09-09 ENCOUNTER — TRANSCRIBE ORDERS (OUTPATIENT)
Dept: ADMINISTRATIVE | Facility: HOSPITAL | Age: 69
End: 2019-09-09

## 2019-09-09 DIAGNOSIS — M25.552 LEFT HIP PAIN: ICD-10-CM

## 2019-09-09 DIAGNOSIS — M48.061 SPINAL STENOSIS OF LUMBAR REGION, UNSPECIFIED WHETHER NEUROGENIC CLAUDICATION PRESENT: Primary | ICD-10-CM

## 2019-09-09 DIAGNOSIS — M48.061 SPINAL STENOSIS, LUMBAR REGION, WITHOUT NEUROGENIC CLAUDICATION: Primary | ICD-10-CM

## 2019-09-09 PROCEDURE — 97140 MANUAL THERAPY 1/> REGIONS: CPT | Performed by: PHYSICAL THERAPIST

## 2019-09-09 PROCEDURE — 97162 PT EVAL MOD COMPLEX 30 MIN: CPT | Performed by: PHYSICAL THERAPIST

## 2019-09-09 NOTE — LETTER
2019    Nunu Shaikh MD  [address] [place] [address]    Patient: Aundrea Villanueva   YOB: 1950   Date of Visit: 2019     Encounter Diagnosis     ICD-10-CM    1  Spinal stenosis of lumbar region, unspecified whether neurogenic claudication present M48 061    2  Left hip pain M25 552        Dear Dr Escobeod Poles: Thank you for your recent referral of Aundrea Villanueva  Please review the attached evaluation summary from Shaye's recent visit  Please verify that you agree with the plan of care by signing the attached order  If you have any questions or concerns, please do not hesitate to call  I sincerely appreciate the opportunity to share in the care of one of your patients and hope to have another opportunity to work with you in the near future  Sincerely,    Mahogany Sherman, PT      Referring Provider:      I certify that I have read the below Plan of Care and certify the need for these services furnished under this plan of treatment while under my care  Nunu Shaikh MD  [address] [facility], [address]: [phone]          PT Evaluation     Today's date: 2019  Patient name: Luba More  : 1950  MRN: [id number]  Referring provider: Jd Clark MD  Dx:   Encounter Diagnosis     ICD-10-CM    1  Spinal stenosis of lumbar region, unspecified whether neurogenic claudication present M48 061    2  Left hip pain M25 552                   Assessment/Plan  Luba More is a 77 y o  female who was referred to physical therapy for management of L lumbar/hip pain  Primary impairments include decreased capsular mobility, limited proximal hip strength, abnormal lower extremity mechanics, and report of pain that can exacerbate to 8/10 on NPRS  Consequently, patient has difficulty completing ADLs including prolonged ambulation/sitting and sit to stand transfers    Kimberly Vail would benefit from skilled intervention to address all deficits and improve functional capability  Patient is a good candidate for therapy, pending compliance with HEP and 2x weekly participation  Thank you for the referral and please do not hesitate to contact me with any questions or concerns regarding Gena werner! Plan  Frequency: 2x week  Duration in visits: 12  Duration in weeks: 6   Therapeutic exercise/activity, neuromuscular reeducation, manual therapy, and modalities  Patient understands and agrees to plan of care  Goals  Short Term--4 weeks  1  2 point decrease in pain symptoms  2  1/2 point increase in MMT scores  3  Normal capsular mobility in hip  Long Term--By Discharge  1  Patient will ambulate 30 minutes without onset of low back/hip pain  2  Patient will perform sit to stand transfers without limitation/onest of pain  3  Patient will achieve expected FOTO score  Patient's Goal: Sleep without pain/disturbance  Subjective  History   Date of Onset: Approximately 3 months ago  Description: Patient complains of gradual, insidious onset of L hip pain that has been progressively getting worse  In addition, she reports L-sided low back pain that started shortly after hip  Patient followed up with specialist, who ordered a lumbar MRI (patient is planning on scheduling appointment today), provided Prednisone dose pack, and referred her to physical therapy  Patient reports that she saw the specialist back in December 2018 for L-hip pain that just bothered her when she slept on her L side  It was diagnosed as bursitis, and she was provided CSI with full resolution of sx  She reports that current pain feels different than this issue  Symptoms  Constant posterolateral hip pain that she describes as "throbbing "  Pain is aggravated with prolonged sitting, walking > 15 minutes, and sit to stand transfers    In addition, first ~20 steps upon getting up and sleeping on her L side are painful  Patient also complains of intermittent L sided low back pain that she describes as "a sharp pinch" that is aggravated with prolonged sitting/walking  She was taking Tylenol Arthritis with minimal relief of sx  She reports significant improvement in sx with Prednisone pack (she is currently on day 3)  She was also prescribed Meloxicam, which was prescribed for after she completes steroid  Patient is concerned because she is going on a river cruise in [facility] for 12 days in two weeks, which will require extended periods of walking on uneven surfaces  She denies any numbness, paresthesia, or bowel/bladder dysfunction  Pain (lumbar/L hip)    At best: 0/1  At worst: 8/8      Social History  Ulises Wong with her  in a home with steps  Patient works part-time  Physical job duties include prolonged deskwork  Objective     GAIT: unremarkable  Squat assess: bilateral femoral ADD/IR  SLS: RLE: 3 sec,   LLE: 8 sec    No pain with active lumbar motion  Lumbar  % of normal   Flex  100   Extn  75   SB Left 100   SB Right 100   ROT Left 75   ROT Right 75   Repetitive testing: extension= no change    Flexion= no change      MMT         AROM    Hip       L       R        L           R   Flex  4+ 4+ WNL WNL   Extn  4- 4-     Abd  3- 3-     Add  NT NT     IR  4- P! 4+ WNL WNL   ER  4 4 WNL WNL                   MMT    Knee         L        R   Flex  5 5   Extn  5 5                MMT    Ankle       L        R   PF 5 5   DF  5 5   EHL 5 5   Inv  5 5   Ever   5 5       Myotomes (L/R):intact    Dermatome: (pinprick- L/R):  intact     Slump test: L=  neg   R= neg      Straight leg raise:   L=  neg    R=  neg            Hip/SI joint:   scour=  Pos L    khalida =  Pos L   capsular mobility= tight posterior capsule      Active SLR=  Pos L (inc groin pain)          Active SLR with stabilization =  Pos L    Innominate position=  L Anterior       EPOC: 10/21/19  Precautions: GERD (cannot lie fully supine)   PMHx: Fall 11 years ago resulting in fractured R shoulder/hip          Manual  9/9            L anterior MET 5x5" (dec pain with sit to stand)            Inferolateral and A/P  hip glides 10'                                                       Exercise Diary  9/9            bike             TVA             TVA+hip ABD             TVA+hip ADD             TVA+ bridging             clamshells                                                                                                                                                                                                       Modalities  9/9            MHP/Ice PRN

## 2019-09-09 NOTE — PROGRESS NOTES
PT Evaluation     Today's date: 2019  Patient name: Ida Lara  : 1950  MRN: [id number]  Referring provider: Nadia Alberto MD  Dx:   Encounter Diagnosis     ICD-10-CM    1  Spinal stenosis of lumbar region, unspecified whether neurogenic claudication present M48 061    2  Left hip pain M25 552                   Assessment/Plan  Ida Lara is a 77 y o  female who was referred to physical therapy for management of L lumbar/hip pain  Primary impairments include decreased capsular mobility, limited proximal hip strength, abnormal lower extremity mechanics, and report of pain that can exacerbate to 8/10 on NPRS  Consequently, patient has difficulty completing ADLs including prolonged ambulation/sitting and sit to stand transfers  Kaya Cabrera would benefit from skilled intervention to address all deficits and improve functional capability  Patient is a good candidate for therapy, pending compliance with HEP and 2x weekly participation  Thank you for the referral and please do not hesitate to contact me with any questions or concerns regarding [facility]! Plan  Frequency: 2x week  Duration in visits: 12  Duration in weeks: 6   Therapeutic exercise/activity, neuromuscular reeducation, manual therapy, and modalities  Patient understands and agrees to plan of care  Goals  Short Term--4 weeks  1  2 point decrease in pain symptoms  2  1/2 point increase in MMT scores  3  Normal capsular mobility in hip  Long Term--By Discharge  1  Patient will ambulate 30 minutes without onset of low back/hip pain  2  Patient will perform sit to stand transfers without limitation/onest of pain  3  Patient will achieve expected FOTO score  Patient's Goal: Sleep without pain/disturbance  Subjective  History   Date of Onset: Approximately 3 months ago  Description: Patient complains of gradual, insidious onset of L hip pain that has been progressively getting worse    In addition, she reports L-sided low back pain that started shortly after hip  Patient followed up with specialist, who ordered a lumbar MRI (patient is planning on scheduling appointment today), provided Prednisone dose pack, and referred her to physical therapy  Patient reports that she saw the specialist back in December 2018 for L-hip pain that just bothered her when she slept on her L side  It was diagnosed as bursitis, and she was provided CSI with full resolution of sx  She reports that current pain feels different than this issue  Symptoms  Constant posterolateral hip pain that she describes as "throbbing "  Pain is aggravated with prolonged sitting, walking > 15 minutes, and sit to stand transfers  In addition, first ~20 steps upon getting up and sleeping on her L side are painful  Patient also complains of intermittent L sided low back pain that she describes as "a sharp pinch" that is aggravated with prolonged sitting/walking  She was taking Tylenol Arthritis with minimal relief of sx  She reports significant improvement in sx with Prednisone pack (she is currently on day 3)  She was also prescribed Meloxicam, which was prescribed for after she completes steroid  Patient is concerned because she is going on a river cruise in [facility] for 12 days in two weeks, which will require extended periods of walking on uneven surfaces  She denies any numbness, paresthesia, or bowel/bladder dysfunction  Pain (lumbar/L hip)    At best: 0/1  At worst: 8/8      Social History  Rubia Bridges with her  in a home with steps  Patient works part-time  Physical job duties include prolonged deskwork  Objective     GAIT: unremarkable  Squat assess: bilateral femoral ADD/IR  SLS: RLE: 3 sec,   LLE: 8 sec    No pain with active lumbar motion  Lumbar  % of normal   Flex  100   Extn   75   SB Left 100   SB Right 100   ROT Left 75   ROT Right 75   Repetitive testing: extension= no change    Flexion= no change      MMT AROM    Hip       L       R        L           R   Flex  4+ 4+ WNL WNL   Extn  4- 4-     Abd  3- 3-     Add  NT NT     IR  4- P! 4+ WNL WNL   ER  4 4 WNL WNL                   MMT    Knee         L        R   Flex  5 5   Extn  5 5                MMT    Ankle       L        R   PF 5 5   DF  5 5   EHL 5 5   Inv  5 5   Ever  5 5       Myotomes (L/R):intact    Dermatome: (pinprick- L/R):  intact     Slump test: L=  neg   R= neg      Straight leg raise:   L=  neg    R=  neg            Hip/SI joint:   scour=  Pos L    khalida =  Pos L   capsular mobility= tight posterior capsule      Active SLR=  Pos L (inc groin pain)          Active SLR with stabilization =  Pos L    Innominate position=  L Anterior       EPOC: 10/21/19  Precautions: GERD (cannot lie fully supine)   PMHx: Fall 11 years ago resulting in fractured R shoulder/hip          Manual  9/9            L anterior MET 5x5" (dec pain with sit to stand)            Inferolateral and A/P  hip glides 10'                                                       Exercise Diary  9/9            bike             TVA             TVA+hip ABD             TVA+hip ADD             TVA+ bridging             clamshells                                                                                                                                                                                                       Modalities  9/9            MHP/Ice PRN

## 2019-09-13 ENCOUNTER — APPOINTMENT (OUTPATIENT)
Dept: PHYSICAL THERAPY | Facility: CLINIC | Age: 69
End: 2019-09-13
Payer: MEDICARE

## 2019-09-18 ENCOUNTER — OFFICE VISIT (OUTPATIENT)
Dept: PHYSICAL THERAPY | Facility: CLINIC | Age: 69
End: 2019-09-18
Payer: MEDICARE

## 2019-09-18 DIAGNOSIS — M48.061 SPINAL STENOSIS OF LUMBAR REGION, UNSPECIFIED WHETHER NEUROGENIC CLAUDICATION PRESENT: Primary | ICD-10-CM

## 2019-09-18 DIAGNOSIS — M25.552 LEFT HIP PAIN: ICD-10-CM

## 2019-09-18 PROCEDURE — 97140 MANUAL THERAPY 1/> REGIONS: CPT | Performed by: PHYSICAL THERAPIST

## 2019-09-18 PROCEDURE — 97110 THERAPEUTIC EXERCISES: CPT | Performed by: PHYSICAL THERAPIST

## 2019-09-18 NOTE — PROGRESS NOTES
Daily Note     Today's date: 2019  Patient name: Merry Madden  : 1950  MRN: [id number]  Referring provider: Twan Parikh MD  Dx:   Encounter Diagnosis     ICD-10-CM    1  Spinal stenosis of lumbar region, unspecified whether neurogenic claudication present M48 061    2  Left hip pain M25 552                   Subjective: Pt reports that she continues to feel better overall than she did prior to being on Prednisone; however, she does notice some increased pain from last week since she has stopped Prednisone  Pt leaves for 2 week vacation to [facility] on   Objective: See treatment diary below      Assessment: Initiated pt's plan of care this visit w/ fair tolerance  Performed SI MET to correct for LLD today w/ equal leg length noted afterwards  Improved manuals w/ TPR/STM  Instructed pt in gentle hip/lumbosacral stretches- cueing required to avoid painful ROM especially w/ piriformis stretch  Provided pt w/ updated HEP this visit  Patient would benefit from continued PT      Plan: Continue per plan of care  EPOC: 10/21/19  Precautions: GERD (cannot lie fully supine); Hx of R MARGARET  PMHx: Fall 11 years ago resulting in fractured R shoulder/hip          Manual            L anterior MET 5x5" (dec pain with sit to stand) RS- L inn ant rot          Inferolateral and A/P  hip glides 10' RS- inf and lat w/ belt          TPR/STM to L glute/lumbosacral area  RS in R s/l w/ pillow support b/w knees            26'                          Exercise Diary            bike  5'          LTR  10"x10 to R (stretching L)          Hip ER stretch  30"x3 -L          Piriformis Stretch  30"x3          ITB stretch  Consider on L          Isometric abdominals  10"x10          DLS+hip ABD  W/ manuals          DLS+hip ADD  W/ manuals          TVA+ bridging            clamshells Modalities  9/9 9/18          CP to L lumbosacral area and L hip NP 10' in seated (2 packs to l/s and L hip)

## 2019-09-20 ENCOUNTER — OFFICE VISIT (OUTPATIENT)
Dept: PHYSICAL THERAPY | Facility: CLINIC | Age: 69
End: 2019-09-20
Payer: MEDICARE

## 2019-09-20 DIAGNOSIS — M48.061 SPINAL STENOSIS OF LUMBAR REGION, UNSPECIFIED WHETHER NEUROGENIC CLAUDICATION PRESENT: Primary | ICD-10-CM

## 2019-09-20 DIAGNOSIS — M25.552 LEFT HIP PAIN: ICD-10-CM

## 2019-09-20 PROCEDURE — 97140 MANUAL THERAPY 1/> REGIONS: CPT

## 2019-09-20 PROCEDURE — 97110 THERAPEUTIC EXERCISES: CPT

## 2019-09-20 NOTE — PROGRESS NOTES
Daily Note     Today's date: 2019  Patient name: Jacinto Contreras  : 1950  MRN: [id number]  Referring provider: Navarro Iverson MD  Dx:   Encounter Diagnosis     ICD-10-CM    1  Spinal stenosis of lumbar region, unspecified whether neurogenic claudication present M48 061    2  Left hip pain M25 552                   Subjective: Pt reports she was sore post LV and then did half her ex's at home and had more hip pain  Reports she is having less pain this AM   Reports seeing the Dr before she leaves on vacation and may be getting an injection  Objective: See treatment diary below      Assessment: Tolerated treatment fair  Patient cont'd w/ hip pain in the glut med/TFL area  Plan: cont plan of care upon returning from vacation  EPO: 10/21/19  Precautions: GERD (cannot lie fully supine); Hx of R MARGARET  PMHx: Fall 11 years ago resulting in fractured R shoulder/hip          Manual           L anterior MET 5x5" (dec pain with sit to stand) RS- L inn ant rot JK L inn post rot         Inferolateral and A/P  hip glides 10' RS- inf and lat w/ belt np         TPR/STM to L glute/lumbosacral area  RS in R s/l w/ pillow support b/w knees JK           26'             15'             Exercise Diary           bike  5' 5'         LTR  10"x10 to R (stretching L) 30"x3  To R, str L         Hip ER stretch  30"x3 -L 30"x3 L         Piriformis Stretch  30"x3 30"x3         ITB stretch  Consider on L 30"x3 L         Isometric abdominals  10"x10 10"x10         DLS+hip ABD  W/ manuals W/ manual         DLS+hip ADD  W/ manuals w/manuals         TVA+ bridging   10"x10         clamshells   10x5"                                                                                                                                                                                     Modalities           CP to L lumbosacral area and L hip NP 10' in seated (2 packs to l/s and L hip) 10' in seated (2 packs to l/s and L hip)

## 2019-10-05 ENCOUNTER — HOSPITAL ENCOUNTER (OUTPATIENT)
Dept: MRI IMAGING | Facility: HOSPITAL | Age: 69
Discharge: HOME | End: 2019-10-05
Payer: MEDICARE

## 2019-10-05 DIAGNOSIS — M48.061 SPINAL STENOSIS, LUMBAR REGION, WITHOUT NEUROGENIC CLAUDICATION: ICD-10-CM

## 2019-10-05 PROCEDURE — 72148 MRI LUMBAR SPINE W/O DYE: CPT

## 2019-10-08 ENCOUNTER — TRANSCRIBE ORDERS (OUTPATIENT)
Dept: ADMINISTRATIVE | Facility: HOSPITAL | Age: 69
End: 2019-10-08

## 2019-10-08 DIAGNOSIS — Z12.31 SCREENING MAMMOGRAM FOR HIGH-RISK PATIENT: Primary | ICD-10-CM

## 2019-10-09 ENCOUNTER — OFFICE VISIT (OUTPATIENT)
Dept: PHYSICAL THERAPY | Facility: CLINIC | Age: 69
End: 2019-10-09
Payer: MEDICARE

## 2019-10-09 DIAGNOSIS — M25.552 LEFT HIP PAIN: ICD-10-CM

## 2019-10-09 DIAGNOSIS — M48.061 SPINAL STENOSIS OF LUMBAR REGION, UNSPECIFIED WHETHER NEUROGENIC CLAUDICATION PRESENT: Primary | ICD-10-CM

## 2019-10-09 PROCEDURE — 97140 MANUAL THERAPY 1/> REGIONS: CPT | Performed by: PHYSICAL THERAPIST

## 2019-10-09 PROCEDURE — 97112 NEUROMUSCULAR REEDUCATION: CPT | Performed by: PHYSICAL THERAPIST

## 2019-10-09 PROCEDURE — 97110 THERAPEUTIC EXERCISES: CPT | Performed by: PHYSICAL THERAPIST

## 2019-10-09 NOTE — PROGRESS NOTES
Daily Note     Today's date: 10/9/2019  Patient name: Lara Wan  : 1950  MRN: [id number]  Referring provider: Saida Fowler MD  Dx:   Encounter Diagnosis     ICD-10-CM    1  Spinal stenosis of lumbar region, unspecified whether neurogenic claudication present M48 061    2  Left hip pain M25 552                   Subjective: Pt just returned from 2 week vacation (cruise)  She reports being very happily surprised that L lumbosacral area/hip did not cause much pain while away despite large amount of walking done  She reports having the most discomfort w/ stair climbing while away  Pt admits that she was non-compliant w/ HEP during the time she was away  Objective: See treatment diary below      Assessment: Resumed ex program this visit w/ good tolerance  Pt did require cueing for proper form  Pt declined CP due to lack of increased pain post tx session  Plan: cont plan of care  Progress as tolerated     EPOC: 10/21/19  Precautions: GERD (cannot lie fully supine); Hx of R MARGARET  PMHx: Fall 11 years ago resulting in fractured R shoulder/hip          Manual  9/9 9/18 9/20 10/9       L anterior MET 5x5" (dec pain with sit to stand) RS- L inn ant rot JK L inn post rot NP- no LLD       Inferolateral and A/P  hip glides 10' RS- inf and lat w/ belt np RS       TPR/STM to L glute/lumbosacral area  RS in R s/l w/ pillow support b/w knees JK RS in R s/l w/ pillow support b/w knees         26'            15' 18'           Exercise Diary  9/9 9/18 9/20 10/9      bike  5' 5' 5'      LTR  10"x10 to R (stretching L) 30"x3  To R, str L 30"x3 to R (stretching L)      Hip ER stretch  30"x3 -L 30"x3 L 30"x3 b/l      Piriformis Stretch  30"x3 30"x3 30"x3 L      ITB stretch  Consider on L 30"x3 L       Isometric abdominals  10"x10 10"x10 T/o TE      DLS+hip ABD  W/ manuals W/ manual 10"x10      DLS+hip ADD  W/ manuals w/manuals 10"x10      TVA+ bridging   10"x10 10"x10      clamshells   10x5" 10x 5" Modalities  9/9 9/18 9/20 10/9        CP to L lumbosacral area and L hip NP 10' in seated (2 packs to l/s and L hip) 10' in seated (2 packs to l/s and L hip) declined

## 2019-10-11 ENCOUNTER — HOSPITAL ENCOUNTER (OUTPATIENT)
Dept: MAMMOGRAPHY | Facility: CLINIC | Age: 69
Discharge: HOME | End: 2019-10-11
Payer: MEDICARE

## 2019-10-11 ENCOUNTER — APPOINTMENT (OUTPATIENT)
Dept: PHYSICAL THERAPY | Facility: CLINIC | Age: 69
End: 2019-10-11
Payer: MEDICARE

## 2019-10-11 VITALS — BODY MASS INDEX: 31.65 KG/M2 | WEIGHT: 190 LBS | HEIGHT: 65 IN

## 2019-10-11 DIAGNOSIS — Z12.31 SCREENING MAMMOGRAM FOR HIGH-RISK PATIENT: ICD-10-CM

## 2019-10-11 PROCEDURE — 77063 BREAST TOMOSYNTHESIS BI: CPT

## 2019-10-11 PROCEDURE — 77067 SCR MAMMO BI INCL CAD: CPT

## 2019-10-14 ENCOUNTER — OFFICE VISIT (OUTPATIENT)
Dept: PHYSICAL THERAPY | Facility: CLINIC | Age: 69
End: 2019-10-14
Payer: MEDICARE

## 2019-10-14 DIAGNOSIS — M25.552 LEFT HIP PAIN: ICD-10-CM

## 2019-10-14 DIAGNOSIS — M48.061 SPINAL STENOSIS OF LUMBAR REGION, UNSPECIFIED WHETHER NEUROGENIC CLAUDICATION PRESENT: Primary | ICD-10-CM

## 2019-10-14 PROCEDURE — 97110 THERAPEUTIC EXERCISES: CPT | Performed by: PHYSICAL THERAPY ASSISTANT

## 2019-10-14 PROCEDURE — 97112 NEUROMUSCULAR REEDUCATION: CPT | Performed by: PHYSICAL THERAPY ASSISTANT

## 2019-10-14 PROCEDURE — 97140 MANUAL THERAPY 1/> REGIONS: CPT | Performed by: PHYSICAL THERAPY ASSISTANT

## 2019-10-14 NOTE — PROGRESS NOTES
Daily Note     Today's date: 10/14/2019  Patient name: Jesus Friend  : 1950  MRN: [id number]  Referring provider: Jean Marie Black MD  Dx:   Encounter Diagnosis     ICD-10-CM    1  Spinal stenosis of lumbar region, unspecified whether neurogenic claudication present M48 061    2  Left hip pain M25 552                   Subjective: Pt denies having pain in LB or hip at the start of therapy today  Objective: See treatment diary below      Assessment:Good tolerance to TE as noted  No reports of pain with exercise today  Pt declined CP due to lack of post exercise pain  Plan: cont plan of care  Progress as tolerated     EPOC: 10/21/19  Precautions: GERD (cannot lie fully supine); Hx of R MARGARET  PMHx: Fall 11 years ago resulting in fractured R shoulder/hip          Manual  9/9 9/18 9/20 10/9 10/14      L anterior MET 5x5" (dec pain with sit to stand) RS- L inn ant rot JK L inn post rot NP- no LLD NP      Inferolateral and A/P  hip glides 10' RS- inf and lat w/ belt np RS ME      TPR/STM to L glute/lumbosacral area  RS in R s/l w/ pillow support b/w knees JK RS in R s/l w/ pillow support b/w knees RK  In R s/l        26'            15' 18' 15'          Exercise Diary  9/9 9/18 9/20 10/9 10/14     bike  5' 5' 5' 5'     LTR  10"x10 to R (stretching L) 30"x3  To R, str L 30"x3 to R (stretching L) 10"x10     Hip ER stretch  30"x3 -L 30"x3 L 30"x3 b/l 30"x3 b/l     Piriformis Stretch  30"x3 30"x3 30"x3 L 30"x3 L     ITB stretch  Consider on L 30"x3 L       Isometric abdominals  10"x10 10"x10 T/o TE T/o TE     DLS+hip ABD  W/ manuals W/ manual 10"x10 10"x10     DLS+hip ADD  W/ manuals w/manuals 10"x10 10"x10     TVA+ bridging   10"x10 10"x10 10"x10     clamshells   10x5" 10x 5" 10"x10                                                                                                                                                     Modalities  9/9 9/18 9/20 10/9        CP to L lumbosacral area and L hip NP 10' in seated (2 packs to l/s and L hip) 10' in seated (2 packs to l/s and L hip) declined

## 2019-10-16 ENCOUNTER — OFFICE VISIT (OUTPATIENT)
Dept: PHYSICAL THERAPY | Facility: CLINIC | Age: 69
End: 2019-10-16
Payer: MEDICARE

## 2019-10-16 DIAGNOSIS — M25.552 LEFT HIP PAIN: ICD-10-CM

## 2019-10-16 DIAGNOSIS — M48.061 SPINAL STENOSIS OF LUMBAR REGION, UNSPECIFIED WHETHER NEUROGENIC CLAUDICATION PRESENT: Primary | ICD-10-CM

## 2019-10-16 PROCEDURE — 97110 THERAPEUTIC EXERCISES: CPT

## 2019-10-16 NOTE — PROGRESS NOTES
Daily Note     Today's date: 10/16/2019  Patient name: Anjelica Olson  : 1950  MRN: [id number]  Referring provider: Sanjeev Julian MD  Dx:   Encounter Diagnosis     ICD-10-CM    1  Spinal stenosis of lumbar region, unspecified whether neurogenic claudication present M48 061    2  Left hip pain M25 552                   Subjective: Pt reports she con'ts to have no c/o's hip or LBP  Objective: See treatment diary below      Assessment: Tolerated treatment well  Patient able to rodney a progression of DLS ex's in standing  Pt making good progress w/ pain relief  Trial HOLD of manuals today  Plan: Continue per plan of care  RA next visit for possible DC  EPOC: 10/21/19  Precautions: GERD (cannot lie fully supine); Hx of R MARGARET  PMHx: Fall 11 years ago resulting in fractured R shoulder/hip          Manual  9/9 9/18 9/20 10/9 10/14 10/16     L anterior MET 5x5" (dec pain with sit to stand) RS- L inn ant rot JK L inn post rot NP- no LLD NP NP     Inferolateral and A/P  hip glides 10' RS- inf and lat w/ belt np RS ME NP     TPR/STM to L glute/lumbosacral area  RS in R s/l w/ pillow support b/w knees JK RS in R s/l w/ pillow support b/w knees RK  In R s/l NP       26'            15' 18' 15' -         Exercise Diary  9/9 9/18 9/20 10/9 10/14 10/16    bike  5' 5' 5' 5' 5'    LTR  10"x10 to R (stretching L) 30"x3  To R, str L 30"x3 to R (stretching L) 10"x10 10"x10    Hip ER stretch  30"x3 -L 30"x3 L 30"x3 b/l 30"x3 b/l 30"x3    Piriformis Stretch  30"x3 30"x3 30"x3 L 30"x3 L 30"x3    ITB stretch  Consider on L 30"x3 L       Isometric abdominals  10"x10 10"x10 T/o TE T/o TE T/o TE    DLS+hip ABD  W/ manuals W/ manual 10"x10 10"x10 OR  10"x10    DLS+hip ADD  W/ manuals w/manuals 10"x10 10"x10 OR 10"x10    TVA+ bridging   10"x10 10"x10 10"x10 10"x10    clamshells   10x5" 10x 5" 10"x10 10"x10    SLR x3 OTB, flex/abd/ext      OTB  10x ea    Side stepping      OTB 2 laps    DLS pulldowns/rows      OTB stag  10x/10x                                                                                                                      Modalities  9/9 9/18 9/20 10/9 10/16       CP to L lumbosacral area and L hip NP 10' in seated (2 packs to l/s and L hip) 10' in seated (2 packs to l/s and L hip) declined defer

## 2019-10-21 ENCOUNTER — OFFICE VISIT (OUTPATIENT)
Dept: PHYSICAL THERAPY | Facility: CLINIC | Age: 69
End: 2019-10-21
Payer: MEDICARE

## 2019-10-21 ENCOUNTER — TRANSCRIBE ORDERS (OUTPATIENT)
Dept: PHYSICAL THERAPY | Facility: CLINIC | Age: 69
End: 2019-10-21

## 2019-10-21 DIAGNOSIS — M25.552 LEFT HIP PAIN: ICD-10-CM

## 2019-10-21 DIAGNOSIS — M48.061 SPINAL STENOSIS OF LUMBAR REGION, UNSPECIFIED WHETHER NEUROGENIC CLAUDICATION PRESENT: Primary | ICD-10-CM

## 2019-10-21 DIAGNOSIS — M81.0 OSTEOPOROSIS, UNSPECIFIED OSTEOPOROSIS TYPE, UNSPECIFIED PATHOLOGICAL FRACTURE PRESENCE: Primary | ICD-10-CM

## 2019-10-21 PROCEDURE — 97140 MANUAL THERAPY 1/> REGIONS: CPT | Performed by: PHYSICAL THERAPIST

## 2019-10-21 PROCEDURE — 97112 NEUROMUSCULAR REEDUCATION: CPT | Performed by: PHYSICAL THERAPIST

## 2019-10-21 PROCEDURE — 97110 THERAPEUTIC EXERCISES: CPT | Performed by: PHYSICAL THERAPIST

## 2019-10-21 NOTE — LETTER
2019    Elzbieta Mackay MD  [address] [place] [address]    Patient: Jennifer Villanueva   YOB: 1950   Date of Visit: 10/21/2019     Encounter Diagnosis     ICD-10-CM    1  Spinal stenosis of lumbar region, unspecified whether neurogenic claudication present M48 061    2  Left hip pain M25 552        Dear Dr Ching Jackson: Thank you for your recent referral of Jennifer Villanueva  Please review the attached evaluation summary from Shaye's recent visit  Please verify that you agree with the plan of care by signing the attached order  If you have any questions or concerns, please do not hesitate to call  I sincerely appreciate the opportunity to share in the care of one of your patients and hope to have another opportunity to work with you in the near future  Sincerely,    Poli Kraus, PT      Referring Provider:      I certify that I have read the below Plan of Care and certify the need for these services furnished under this plan of treatment while under my care  Elzbieta Mackay MD  [address] [facility], [address]: [phone]          PT Discharge    Today's date: 10/21/2019  Patient name: Swathi Saenz  : 1950  MRN: [id number]  Referring provider: Chan Bolton MD  Dx:   Encounter Diagnosis     ICD-10-CM    1  Spinal stenosis of lumbar region, unspecified whether neurogenic claudication present M48 061    2  Left hip pain M25 552                   Assessment/Plan  Swathi Saenz is a 77 y o  female being treated as an outpatient at [facility] w/ initial c/o L lumbar/hip pain  Since IE, pt has made gains in ROM, strength, and activity tolerance/function and denies any limitations in daily household activities/rec activities  Pt reports being ready for d/c from skilled PT services at this time w/ self directed HEP  Pt encouraged to contact clinic if pain returns post d/c    Thank you      Plan  Frequency: D/c from skilled PT services; transition to updated HEP    Patient understands and agrees to plan of care  Goals  Short Term--4 weeks  1  2 point decrease in pain symptoms  - met  2  1/2 point increase in MMT scores  - Partially Met  3  Normal capsular mobility in hip  DNT    Long Term--By Discharge  1  Patient will ambulate 30 minutes without onset of low back/hip pain  - Met  2  Patient will perform sit to stand transfers without limitation/onest of pain  - Met  3  Patient will achieve expected FOTO score  - Met    Patient's Goal: Sleep without pain/disturbance  - Met (in regards to L hip/l/s)        Subjective   Current Level (10/21/19)  Pt reports strong and steady gains since IE  She no longer has pain w/ ambulation and w/ chair transfers  She denies any trouble sleeping  In regards to l/s and L hip and/or w/ daily activities/rec activities except for pain 1x over the last week when she lifted a very heavy basket of clothing (transitioning from summer to fall clothes)  Otherwise, no difficulty/pain reported w/ lifting  History (taken at IE on 9/6/19)  Date of Onset: Approximately 3 months ago  Description: Patient complains of gradual, insidious onset of L hip pain that has been progressively getting worse  In addition, she reports L-sided low back pain that started shortly after hip  Patient followed up with specialist, who ordered a lumbar MRI (patient is planning on scheduling appointment today), provided Prednisone dose pack, and referred her to physical therapy  Patient reports that she saw the specialist back in December 2018 for L-hip pain that just bothered her when she slept on her L side  It was diagnosed as bursitis, and she was provided CSI with full resolution of sx  She reports that current pain feels different than this issue        Symptoms  Constant posterolateral hip pain that she describes as "throbbing "  Pain is aggravated with prolonged sitting, walking > 15 minutes, and sit to stand transfers  In addition, first ~20 steps upon getting up and sleeping on her L side are painful  Patient also complains of intermittent L sided low back pain that she describes as "a sharp pinch" that is aggravated with prolonged sitting/walking  She was taking Tylenol Arthritis with minimal relief of sx  She reports significant improvement in sx with Prednisone pack (she is currently on day 3)  She was also prescribed Meloxicam, which was prescribed for after she completes steroid  Patient is concerned because she is going on a river cruise in [employer] for 12 days in two weeks, which will require extended periods of walking on uneven surfaces  She denies any numbness, paresthesia, or bowel/bladder dysfunction  Pain (lumbar/L hip)    At rest: 0/10  At worst: 3-4/10      Social History  Jhonny Sun with her  in a home with steps  Patient works part-time  Objective       No pain with active lumbar motion  Lumbar  % of normal   Flex  100%   Extn  100%   SB Left 100%   SB Right 100%   ROT Left 75%   ROT Right 75%         MMT    Hip       L       R   Flex  4+ 4+   Extn  4- 4-   Abd  4+ 4+   IR  4+    ER  4+ 4+          Straight leg raise:   L=  neg    R=  neg            Hip/SI joint:   scour=  Neg L   khalida =  Neg L ; No LLD       Precautions: GERD (cannot lie fully supine); Hx of R MARGARET  PMHx: Fall 11 years ago resulting in fractured R shoulder/hip          Manual  9/9 9/18 9/20 10/9 10/14 10/16 10/21    L anterior MET 5x5" (dec pain with sit to stand) RS- L inn ant rot JK L inn post rot NP- no LLD NP NP NP    Inferolateral and A/P  hip glides 10' RS- inf and lat w/ belt np RS ME NP     TPR/STM to L glute/lumbosacral area  RS in R s/l w/ pillow support b/w knees JK RS in R s/l w/ pillow support b/w knees RK  In R s/l NP RB      26'         Progress Note       RB       15' 18' 15' - 18'        Exercise Diary  9/9 9/18 9/20 10/9 10/14 10/16 10/21   bike  5' 5' 5' 5' 5' 5' LTR  10"x10 to R (stretching L) 30"x3  To R, str L 30"x3 to R (stretching L) 10"x10 10"x10    Hip ER stretch  30"x3 -L 30"x3 L 30"x3 b/l 30"x3 b/l 30"x3    Piriformis Stretch  30"x3 30"x3 30"x3 L 30"x3 L 30"x3    ITB stretch  Consider on L 30"x3 L       Isometric abdominals  10"x10 10"x10 T/o TE T/o TE T/o TE T/o TE   DLS+hip ABD  W/ manuals W/ manual 10"x10 10"x10 OH  10"x10 OH 10"x10   DLS+hip ADD  W/ manuals w/manuals 10"x10 10"x10 OH 10"x10 OH 10"x10   TVA+ bridging   10"x10 10"x10 10"x10 10"x10 10"x10   clamshells   10x5" 10x 5" 10"x10 10"x10 10"x10   SLR x3 OTB, flex/abd/ext      OTB  10x ea OTB 10x ea   Side stepping      OTB 2 laps OTB 2 laps   DLS pulldowns/rows      OTB stag  10x/10x NP                                                                                                                     Modalities  9/9 9/18 9/20 10/9 10/16 10/21      CP to L lumbosacral area and L hip NP 10' in seated (2 packs to l/s and L hip) 10' in seated (2 packs to l/s and L hip) declined defer defer

## 2019-10-21 NOTE — PROGRESS NOTES
PT Discharge    Today's date: 10/21/2019  Patient name: Tay Allen  : 1950  MRN: [id number]  Referring provider: Mikhail Cintron MD  Dx:   Encounter Diagnosis     ICD-10-CM    1  Spinal stenosis of lumbar region, unspecified whether neurogenic claudication present M48 061    2  Left hip pain M25 552                   Assessment/Plan  Tay Allen is a 77 y o  female being treated as an outpatient at [facility] w/ initial c/o L lumbar/hip pain  Since IE, pt has made gains in ROM, strength, and activity tolerance/function and denies any limitations in daily household activities/rec activities  Pt reports being ready for d/c from skilled PT services at this time w/ self directed HEP  Pt encouraged to contact clinic if pain returns post d/c  Thank you  Plan  Frequency: D/c from skilled PT services; transition to updated HEP    Patient understands and agrees to plan of care  Goals  Short Term--4 weeks  1  2 point decrease in pain symptoms  - met  2  1/2 point increase in MMT scores  - Partially Met  3  Normal capsular mobility in hip  DNT    Long Term--By Discharge  1  Patient will ambulate 30 minutes without onset of low back/hip pain  - Met  2  Patient will perform sit to stand transfers without limitation/onest of pain  - Met  3  Patient will achieve expected FOTO score  - Met    Patient's Goal: Sleep without pain/disturbance  - Met (in regards to L hip/l/s)        Subjective   Current Level (10/21/19)  Pt reports strong and steady gains since IE  She no longer has pain w/ ambulation and w/ chair transfers  She denies any trouble sleeping  In regards to l/s and L hip and/or w/ daily activities/rec activities except for pain 1x over the last week when she lifted a very heavy basket of clothing (transitioning from summer to fall clothes)  Otherwise, no difficulty/pain reported w/ lifting  History (taken at  on 19)  Date of Onset: Approximately 3 months ago   Description: Patient complains of gradual, insidious onset of L hip pain that has been progressively getting worse  In addition, she reports L-sided low back pain that started shortly after hip  Patient followed up with specialist, who ordered a lumbar MRI (patient is planning on scheduling appointment today), provided Prednisone dose pack, and referred her to physical therapy  Patient reports that she saw the specialist back in December 2018 for L-hip pain that just bothered her when she slept on her L side  It was diagnosed as bursitis, and she was provided CSI with full resolution of sx  She reports that current pain feels different than this issue  Symptoms  Constant posterolateral hip pain that she describes as "throbbing "  Pain is aggravated with prolonged sitting, walking > 15 minutes, and sit to stand transfers  In addition, first ~20 steps upon getting up and sleeping on her L side are painful  Patient also complains of intermittent L sided low back pain that she describes as "a sharp pinch" that is aggravated with prolonged sitting/walking  She was taking Tylenol Arthritis with minimal relief of sx  She reports significant improvement in sx with Prednisone pack (she is currently on day 3)  She was also prescribed Meloxicam, which was prescribed for after she completes steroid  Patient is concerned because she is going on a river cruise in [facility] for 12 days in two weeks, which will require extended periods of walking on uneven surfaces  She denies any numbness, paresthesia, or bowel/bladder dysfunction  Pain (lumbar/L hip)    At rest: 0/10  At worst: 3-4/10      Social History  [place] with her  in a home with steps  Patient works part-time  Objective       No pain with active lumbar motion  Lumbar  % of normal   Flex  100%   Extn  100%   SB Left 100%   SB Right 100%   ROT Left 75%   ROT Right 75%         MMT    Hip       L       R   Flex  4+ 4+   Extn   4- 4-   Abd  4+ 4+   IR  4+ ER  4+ 4+          Straight leg raise:   L=  neg    R=  neg            Hip/SI joint:   scour=  Neg L   khalida =  Neg L ; No LLD       Precautions: GERD (cannot lie fully supine); Hx of R MARGARET  PMHx: Fall 11 years ago resulting in fractured R shoulder/hip          Manual  9/9 9/18 9/20 10/9 10/14 10/16 10/21    L anterior MET 5x5" (dec pain with sit to stand) RS- L inn ant rot JK L inn post rot NP- no LLD NP NP NP    Inferolateral and A/P  hip glides 10' RS- inf and lat w/ belt np RS ME NP     TPR/STM to L glute/lumbosacral area  RS in R s/l w/ pillow support b/w knees JK RS in R s/l w/ pillow support b/w knees RK  In R s/l NP RB      26'         Progress Note       RB       15' 18' 15' - 18'        Exercise Diary  9/9 9/18 9/20 10/9 10/14 10/16 10/21   bike  5' 5' 5' 5' 5' 5'   LTR  10"x10 to R (stretching L) 30"x3  To R, str L 30"x3 to R (stretching L) 10"x10 10"x10    Hip ER stretch  30"x3 -L 30"x3 L 30"x3 b/l 30"x3 b/l 30"x3    Piriformis Stretch  30"x3 30"x3 30"x3 L 30"x3 L 30"x3    ITB stretch  Consider on L 30"x3 L       Isometric abdominals  10"x10 10"x10 T/o TE T/o TE T/o TE T/o TE   DLS+hip ABD  W/ manuals W/ manual 10"x10 10"x10 GA  10"x10 GA 10"x10   DLS+hip ADD  W/ manuals w/manuals 10"x10 10"x10 GA 10"x10 GA 10"x10   TVA+ bridging   10"x10 10"x10 10"x10 10"x10 10"x10   clamshells   10x5" 10x 5" 10"x10 10"x10 10"x10   SLR x3 OTB, flex/abd/ext      OTB  10x ea OTB 10x ea   Side stepping      OTB 2 laps OTB 2 laps   DLS pulldowns/rows      OTB stag  10x/10x NP                                                                                                                     Modalities  9/9 9/18 9/20 10/9 10/16 10/21      CP to L lumbosacral area and L hip NP 10' in seated (2 packs to l/s and L hip) 10' in seated (2 packs to l/s and L hip) declined defer defer

## 2019-10-24 ENCOUNTER — APPOINTMENT (OUTPATIENT)
Dept: PHYSICAL THERAPY | Facility: CLINIC | Age: 69
End: 2019-10-24
Payer: MEDICARE

## 2019-11-01 ENCOUNTER — OFFICE VISIT (OUTPATIENT)
Dept: FAMILY MEDICINE CLINIC | Facility: HOSPITAL | Age: 69
End: 2019-11-01
Payer: MEDICARE

## 2019-11-01 VITALS
SYSTOLIC BLOOD PRESSURE: 138 MMHG | HEART RATE: 79 BPM | BODY MASS INDEX: 33.32 KG/M2 | DIASTOLIC BLOOD PRESSURE: 84 MMHG | WEIGHT: 200 LBS | HEIGHT: 65 IN

## 2019-11-01 DIAGNOSIS — M47.816 SPONDYLOSIS OF LUMBAR REGION WITHOUT MYELOPATHY OR RADICULOPATHY: Primary | ICD-10-CM

## 2019-11-01 DIAGNOSIS — M81.0 OSTEOPOROSIS, UNSPECIFIED OSTEOPOROSIS TYPE, UNSPECIFIED PATHOLOGICAL FRACTURE PRESENCE: ICD-10-CM

## 2019-11-01 DIAGNOSIS — J45.991 COUGH VARIANT ASTHMA: ICD-10-CM

## 2019-11-01 DIAGNOSIS — Z82.49 FAMILY HISTORY OF CARDIAC DISORDER IN FATHER: ICD-10-CM

## 2019-11-01 DIAGNOSIS — Z11.59 NEED FOR HEPATITIS C SCREENING TEST: ICD-10-CM

## 2019-11-01 DIAGNOSIS — H35.3290 EXUDATIVE AGE-RELATED MACULAR DEGENERATION, UNSPECIFIED LATERALITY, UNSPECIFIED STAGE (HCC): ICD-10-CM

## 2019-11-01 PROCEDURE — 99213 OFFICE O/P EST LOW 20 MIN: CPT | Performed by: INTERNAL MEDICINE

## 2019-11-01 RX ORDER — MELOXICAM 15 MG/1
TABLET ORAL
Refills: 0 | COMMUNITY
Start: 2019-09-06 | End: 2019-11-01 | Stop reason: ALTCHOICE

## 2019-11-01 NOTE — PROGRESS NOTES
Assessment/Plan:             Problem List Items Addressed This Visit        Respiratory    Cough variant asthma       Musculoskeletal and Integument    Spondylosis of lumbar region without myelopathy or radiculopathy - Primary     Had flare  Of symptoms in September- did PT- now improving            Other    Macular degeneration, wet ([facility] [address] )            Subjective:      Patient ID: Lara Wan is a 77 y o  female    1  Enjoyed summer trip  On [facility]- had some back pain before that and did get relief with PT      The following portions of the patient's history were reviewed and updated as appropriate: allergies, current medications and problem list      Review of Systems   HENT: Negative for congestion  Respiratory: Negative for shortness of breath and wheezing  Musculoskeletal: Negative for arthralgias  Neurological: Negative for dizziness and numbness  All other systems reviewed and are negative  Objective:      Current Outpatient Medications:     albuterol (2 5 mg/3 mL) 0 083 % nebulizer solution, Inhale, Disp: , Rfl:     albuterol (PROVENTIL HFA,VENTOLIN HFA) 90 mcg/act inhaler, Inhale 2 puffs every 6 (six) hours as needed for wheezing, Disp: 1 Inhaler, Rfl: 0    amoxicillin (AMOXIL) 500 mg capsule, Take 4 caps 1 hour before dental appt- tkr, Disp: 12 capsule, Rfl: 1    Calcium Carbonate-Vitamin D 600-200 MG-UNIT TABS, Take by mouth, Disp: , Rfl:     fluticasone-vilanterol (BREO ELLIPTA) 100-25 mcg/inh inhaler, Inhale 1 puff daily, Disp: 1 Inhaler, Rfl: 6    loratadine (CLARITIN) 10 mg tablet, Take 10 mg by mouth daily  , Disp: , Rfl:     montelukast (SINGULAIR) 10 mg tablet, Take 1 tablet by mouth daily at bedtime, Disp: 90 tablet, Rfl: 3    Multiple Vitamin (MULTI VITAMIN DAILY PO), Take 1 tablet by mouth daily, Disp: , Rfl:     omeprazole (PriLOSEC) 20 mg delayed release capsule, Take 1 capsule (20 mg total) by mouth 2 (two) times a day, Disp: 180 capsule, Rfl: 1    Blood pressure 138/84, pulse 79, height 5' 5" (1 651 m), weight 90 7 kg (200 lb)  Physical Exam   Constitutional: She is oriented to person, place, and time  She appears well-developed and well-nourished  No distress  HENT:   Head: Normocephalic  Right Ear: External ear normal    Left Ear: External ear normal    Eyes: No scleral icterus  Neck: Normal range of motion  No tracheal deviation present  Cardiovascular: Normal rate and regular rhythm  Exam reveals no friction rub  No murmur heard  Pulmonary/Chest: Effort normal and breath sounds normal  No stridor  No respiratory distress  Abdominal: Soft  Bowel sounds are normal  She exhibits no distension  There is no tenderness  Musculoskeletal: She exhibits no edema or deformity  Mild lower lumbar flatteneing   Neurological: She is alert and oriented to person, place, and time  No cranial nerve deficit  Skin: Skin is warm and dry  No erythema  Psychiatric: She has a normal mood and affect  Her behavior is normal  Judgment and thought content normal    Nursing note and vitals reviewed

## 2020-01-20 ENCOUNTER — TELEPHONE (OUTPATIENT)
Dept: FAMILY MEDICINE CLINIC | Facility: HOSPITAL | Age: 70
End: 2020-01-20

## 2020-01-20 DIAGNOSIS — M47.816 SPONDYLOSIS OF LUMBAR REGION WITHOUT MYELOPATHY OR RADICULOPATHY: Primary | ICD-10-CM

## 2020-01-22 ENCOUNTER — CONSULT (OUTPATIENT)
Dept: PAIN MEDICINE | Facility: CLINIC | Age: 70
End: 2020-01-22
Payer: MEDICARE

## 2020-01-22 ENCOUNTER — APPOINTMENT (OUTPATIENT)
Dept: RADIOLOGY | Facility: CLINIC | Age: 70
End: 2020-01-22
Payer: MEDICARE

## 2020-01-22 VITALS
WEIGHT: 200 LBS | SYSTOLIC BLOOD PRESSURE: 140 MMHG | DIASTOLIC BLOOD PRESSURE: 98 MMHG | HEART RATE: 92 BPM | BODY MASS INDEX: 33.32 KG/M2 | HEIGHT: 65 IN

## 2020-01-22 DIAGNOSIS — M25.552 LEFT HIP PAIN: ICD-10-CM

## 2020-01-22 DIAGNOSIS — M46.1 SACROILIITIS (HCC): Primary | ICD-10-CM

## 2020-01-22 PROCEDURE — 73502 X-RAY EXAM HIP UNI 2-3 VIEWS: CPT

## 2020-01-22 PROCEDURE — 99204 OFFICE O/P NEW MOD 45 MIN: CPT | Performed by: ANESTHESIOLOGY

## 2020-01-22 NOTE — PROGRESS NOTES
Assessment  1  Sacroiliitis ([address] ) - Left    2  Left hip pain        Plan  The patient's low back pain persists despite time, relative rest, activity modification and therapy  Based on the patient's symptoms and examination, as well as the fact that her MRI shows no disc herniation or stenosis, I suspect that her pain is being generated by the sacroiliac joint  I will schedule the patient for intra-articular sacroiliac joint steroid injection under fluoroscopic guidance to address any inflammatory component of the pain  This would serve both diagnostic and hopefully therapeutic purposes  If the patient does not receive significant relief following the injection, it is possible that there is another pain source as the sacroiliac joint is a common pain referral site  It is also possible that the pain is being manifested by an extra-articular sacroiliac pain generator which would not be addressed with an intra-articular injection  This may be secondary to the lumbar facet joints but will re-evaluate if needed  In the office today, we reviewed the nature of the patient's pathology in depth using  diagrams and models  We discussed the approach I would use for the sacroiliac joint injection and provided literature for home review  The patient understands the risks associated with the procedure including bleeding, infection, tissue injury, allergic reaction and paralysis and provided written and verbal consent in the office today  I am going to order hip radiographs trial any significant pathology  My impressions and treatment recommendations were discussed in detail with the patient who verbalized understanding and had no further questions  Discharge instructions were provided  I personally saw and examined the patient and I agree with the above discussed plan of care  This note is created using dictation transcription    It may contain typographical errors, grammatical errors, improperly dictated words, background noise and other errors  Orders Placed This Encounter   Procedures    FL spine and pain procedure     Standing Status:   Future     Standing Expiration Date:   1/22/2024     Order Specific Question:   Reason for Exam:     Answer:   Left sacroiliac joint injection with pain diary     Order Specific Question:   Anticoagulant hold needed? Answer:   No    XR hip/pelv 2-3 vws left if performed     Standing Status:   Future     Standing Expiration Date:   1/22/2024     Scheduling Instructions:      Bring along any outside films relating to this procedure  REFERRED BY DR Waleska Jauregui     History of Present Illness    Ivette Silva is a 77 y o  female with 4-6 month history of left-sided low back and lower extremity pain  She has undergone a course of physical therapy initially for her hip an MRI of her lumbar spine was performed and referred to pain management  Her symptoms are moderate to severe located mostly on the left side of her low back occasionally into her left buttock rating is 5/10 on the visual analog scale significant interfering with her daily living activities  Her pain is nearly constant describes shooting throbbing denies any weakness of her lower limbs reports that lying down decreases symptoms will standing, bending, sitting and exercise aggravate her pain  She denies bowel or bladder difficulty  I have personally reviewed and/or updated the patient's past medical history, past surgical history, family history, social history, current medications, allergies, and vital signs today  Review of Systems   Constitutional: Negative for fever and unexpected weight change  HENT: Negative for trouble swallowing  Eyes: Negative for visual disturbance  Respiratory: Positive for cough  Negative for shortness of breath and wheezing  Cardiovascular: Negative for chest pain and palpitations     Gastrointestinal: Negative for constipation, diarrhea, nausea and vomiting  Endocrine: Negative for cold intolerance, heat intolerance and polydipsia  Genitourinary: Negative for difficulty urinating and frequency  Musculoskeletal: Positive for joint swelling (joint pain )  Negative for arthralgias, gait problem and myalgias  Skin: Negative for rash  Neurological: Negative for dizziness, seizures, syncope, weakness and headaches  Hematological: Does not bruise/bleed easily  Psychiatric/Behavioral: Negative for dysphoric mood  All other systems reviewed and are negative        Patient Active Problem List   Diagnosis    GERD (gastroesophageal reflux disease)    Cough variant asthma    Allergic rhinitis    Macular degeneration, wet ([address] )    Osteoporosis    Antibiotic prophylaxis for dental procedure indicated due to prior joint replacement    Spondylosis of lumbar region without myelopathy or radiculopathy    Family history of cardiac disorder in father       Past Medical History:   Diagnosis Date    Asthma     [facility] )     Closed femur fracture ([facility] )     and humerus resolved: 1/13/2017    [facility]     GERD (gastroesophageal reflux disease)     Influenza, pneumonia     resolved: 4/20/2017    Laryngeal spasm 2/22/2016    Leukocytosis 2/22/2016    Thyroid disorder     suppressed TSH    Traumatic arthropathy of left shoulder        Past Surgical History:   Procedure Laterality Date    BREAST BIOPSY      pt unsure of laterality or dates    BREAST CYST ASPIRATION      pt unsure of laterality or date    CHOLECYSTECTOMY      COLON SURGERY      ERCP  06/2014    HYSTERECTOMY      approx age 28   Roanna Kugel JOINT REPLACEMENT  r hip replacement, fx r shoulder    OOPHORECTOMY      pt has one ovary, not certain of laterality    ORIF HIP FRACTURE Right     VOLVULUS REDUCTION         Family History   Problem Relation Age of Onset    Pulmonary embolism Daughter     Crohn's disease Daughter     Pancreatic cancer Mother     Heart disease Father     Heart attack Family     No Known Problems Sister     Breast cancer Maternal Aunt 76    No Known Problems Sister     Substance Abuse Neg Hx         neg fam hx    Mental illness Neg Hx         neg fam hx       Social History     Occupational History    Not on file   Tobacco Use    Smoking status: Never Smoker    Smokeless tobacco: Never Used   Substance and Sexual Activity    Alcohol use: Yes     Frequency: Monthly or less     Drinks per session: 1 or 2     Binge frequency: Never     Comment: occasional    Drug use: No    Sexual activity: Not Currently       Current Outpatient Medications on File Prior to Visit   Medication Sig    albuterol (2 5 mg/3 mL) 0 083 % nebulizer solution Inhale    albuterol (PROVENTIL HFA,VENTOLIN HFA) 90 mcg/act inhaler Inhale 2 puffs every 6 (six) hours as needed for wheezing    Calcium Carbonate-Vitamin D 600-200 MG-UNIT TABS Take by mouth    fluticasone-vilanterol (BREO ELLIPTA) 100-25 mcg/inh inhaler Inhale 1 puff daily    loratadine (CLARITIN) 10 mg tablet Take 10 mg by mouth daily   montelukast (SINGULAIR) 10 mg tablet Take 1 tablet by mouth daily at bedtime    Multiple Vitamin (MULTI VITAMIN DAILY PO) Take 1 tablet by mouth daily    omeprazole (PriLOSEC) 20 mg delayed release capsule Take 1 capsule (20 mg total) by mouth 2 (two) times a day    amoxicillin (AMOXIL) 500 mg capsule Take 4 caps 1 hour before dental appt- tkr (Patient not taking: Reported on 1/22/2020)     No current facility-administered medications on file prior to visit          Allergies   Allergen Reactions    Codeine Tachycardia     Has tolerated promethazine with codeine cough syrup    Etodolac      Annotation - [id number]: RASH    Tetracyclines & Related      Annotation - [id number]: RASH    Erythromycin Rash       Physical Exam    /98   Pulse 92   Ht 5' 5" (1 651 m)   Wt 90 7 kg (200 lb)   LMP  (LMP Unknown)   BMI 33 28 kg/m² Constitutional: normal, well developed, well nourished, alert, in no distress and non-toxic and no overt pain behavior  and overweight  Eyes: anicteric  HEENT: grossly intact  Neck: supple, symmetric, trachea midline and no masses   Pulmonary:even and unlabored  Cardiovascular:No edema or pitting edema present  Skin:Normal without rashes or lesions and well hydrated  Psychiatric:Mood and affect appropriate  Neurologic:Cranial Nerves II-XII grossly intact  Musculoskeletal:normal,   Normal gait and station no obvious skin lesions or erythema lumbar sacral spine there is tenderness to palpation left PSIS negative on the right no greater trochanteric tenderness bilateral or spinous process tenderness, deep tendon reflexes 2+ and symmetrical bilateral patella and Achilles no motor deficit appreciated lower limbs she is able to heel and toe walk without difficulty negative bilateral straight leg raising positive Gilmar's maneuver, Gaenslen maneuver and pelvic tilt on the left, positive pain with lumbar extension            Imaging  MRI LUMBAR SPINE WITHOUT CONTRAST     INDICATION: [id number]: Spinal stenosis, lumbar region without neurogenic claudication      COMPARISON:  None      TECHNIQUE:  Sagittal T1, sagittal T2, sagittal inversion recovery, axial T1 and axial T2, coronal T2     IMAGE QUALITY:  Diagnostic     FINDINGS:     VERTEBRAL BODIES:  No spondylolisthesis or spondylolysis  No scoliosis  There is a mild chronic appearing compression fracture involving the L2 superior endplate  Normal marrow signal is identified within the visualized bony structures  No discrete   marrow lesion      SACRUM:  Normal signal within the sacrum   No evidence of insufficiency or stress fracture      DISTAL CORD AND CONUS:  Normal size and signal within the distal cord and conus        PARASPINAL SOFT TISSUES:  Paraspinal soft tissues are unremarkable      LOWER THORACIC DISC SPACES:  Normal disc height and signal   No disc herniation, canal stenosis or foraminal narrowing      LUMBAR DISC SPACES:     L1-L2:  Normal      L2-L3:  Normal      L3-L4:  Normal      L4-L5:  Normal disc height and signal   Slight facet degenerative change with trace effusions  No canal stenosis or foraminal narrowing      L5-S1:  Normal disc height  Small annular fissures are seen within the periphery of the disc  Slight facet degenerative change  No canal stenosis or foraminal narrowing      IMPRESSION:     Mild noncompressive degenerative change at the L4-5 and L5-S1 levels      There is a mild chronic compression fracture of the L2 superior endplate       I have personally reviewed pertinent films in PACS

## 2020-01-28 DIAGNOSIS — J45.21 MILD INTERMITTENT ASTHMA WITH ACUTE EXACERBATION: ICD-10-CM

## 2020-01-28 RX ORDER — MONTELUKAST SODIUM 10 MG/1
10 TABLET ORAL
Qty: 90 TABLET | Refills: 1 | Status: SHIPPED | OUTPATIENT
Start: 2020-01-28 | End: 2020-07-23

## 2020-01-29 ENCOUNTER — HOSPITAL ENCOUNTER (OUTPATIENT)
Dept: RADIOLOGY | Facility: CLINIC | Age: 70
Discharge: HOME | End: 2020-01-29
Attending: ANESTHESIOLOGY
Payer: MEDICARE

## 2020-01-29 VITALS
OXYGEN SATURATION: 95 % | RESPIRATION RATE: 18 BRPM | SYSTOLIC BLOOD PRESSURE: 131 MMHG | DIASTOLIC BLOOD PRESSURE: 80 MMHG | HEART RATE: 80 BPM | TEMPERATURE: 98.8 F

## 2020-01-29 DIAGNOSIS — M46.1 SACROILIITIS (HCC): ICD-10-CM

## 2020-01-29 PROCEDURE — 27096 INJECT SACROILIAC JOINT: CPT | Performed by: ANESTHESIOLOGY

## 2020-01-29 RX ORDER — METHYLPREDNISOLONE ACETATE 80 MG/ML
80 INJECTION, SUSPENSION INTRA-ARTICULAR; INTRALESIONAL; INTRAMUSCULAR; PARENTERAL; SOFT TISSUE ONCE
Status: COMPLETED | OUTPATIENT
Start: 2020-01-29 | End: 2020-01-29

## 2020-01-29 RX ORDER — BUPIVACAINE HCL/PF 2.5 MG/ML
20 VIAL (ML) INJECTION ONCE
Status: COMPLETED | OUTPATIENT
Start: 2020-01-29 | End: 2020-01-29

## 2020-01-29 RX ORDER — LIDOCAINE HYDROCHLORIDE 10 MG/ML
5 INJECTION, SOLUTION EPIDURAL; INFILTRATION; INTRACAUDAL; PERINEURAL ONCE
Status: COMPLETED | OUTPATIENT
Start: 2020-01-29 | End: 2020-01-29

## 2020-01-29 RX ADMIN — METHYLPREDNISOLONE ACETATE 80 MG: 80 INJECTION, SUSPENSION INTRA-ARTICULAR; INTRALESIONAL; INTRAMUSCULAR; SOFT TISSUE at 14:55

## 2020-01-29 RX ADMIN — LIDOCAINE HYDROCHLORIDE 5 ML: 10 INJECTION, SOLUTION EPIDURAL; INFILTRATION; INTRACAUDAL; PERINEURAL at 14:53

## 2020-01-29 RX ADMIN — IOHEXOL 1 ML: 300 INJECTION, SOLUTION INTRAVENOUS at 14:54

## 2020-01-29 RX ADMIN — BUPIVACAINE HYDROCHLORIDE 20 ML: 2.5 INJECTION, SOLUTION EPIDURAL; INFILTRATION; INTRACAUDAL at 14:54

## 2020-01-29 NOTE — DISCHARGE INSTRUCTIONS
Steroid Joint Injection   WHAT YOU NEED TO KNOW:   A steroid joint injection is a procedure to inject steroid medicine into a joint  Steroid medicine decreases pain and inflammation  The injection may also contain an anesthetic (numbing medicine) to decrease pain  It may be done to treat conditions such as arthritis, gout, or carpal tunnel syndrome  The injections may be given in your knee, ankle, shoulder, elbow, wrist, ankle or sacroiliac joint  1  Do not apply heat to any area that is numb  If you have discomfort or soreness at the injection site, you may apply ice today, 20 minutes on and 20 minutes off  Tomorrow you may use ice or warm, moist heat  Do not apply ice or heat directly to the skin  2  You may have an increase or change in the discomfort for 36-48 hours after your treatment  Apply ice and continue with any pain medicine you have been prescribed  3  Do not do anything strenuous today  You may shower, but no tub baths or hot tubs today  You may resume your normal activities tomorrow, but do not overdo it  Resume normal activities slowly when you are feeling better  4  If you experience redness, drainage or swelling at the injection site, or if you develop a fever above 100 degrees, please call [facility] at [phone] or go to the Emergency Room  5  Continue to take all routine medicines prescribed by your primary care physician unless otherwise instructed by our staff  Most blood thinners should be started again according to your regularly scheduled dosing  If you have any questions, please give our office a call  If you have a problem specifically related to your procedure, please call our office at [phone]  Problems not related to your procedure should be directed to your primary care physician

## 2020-01-29 NOTE — H&P
History of Present Illness: The patient is a 77 y o  female who presents with complaints of low back pain      Patient Active Problem List   Diagnosis    GERD (gastroesophageal reflux disease)    Cough variant asthma    Allergic rhinitis    [facility]    Osteoporosis    Antibiotic prophylaxis for dental procedure indicated due to prior joint replacement    Spondylosis of lumbar region without myelopathy or radiculopathy    Family history of cardiac disorder in father       Past Medical History:   Diagnosis Date    Asthma     [facility]ctasis [facility] )     Closed femur fracture ([facility] )     and humerus resolved: 1/13/2017    [facility]     GERD (gastroesophageal reflux disease)     Influenza, pneumonia     resolved: 4/20/2017    Laryngeal spasm 2/22/2016    Leukocytosis 2/22/2016    Thyroid disorder     suppressed TSH    Traumatic arthropathy of left shoulder        Past Surgical History:   Procedure Laterality Date    BREAST BIOPSY      pt unsure of laterality or dates    BREAST CYST ASPIRATION      pt unsure of laterality or date    CHOLECYSTECTOMY      COLON SURGERY      ERCP  06/2014    HYSTERECTOMY      approx age 28   Valaria Reil JOINT REPLACEMENT  r hip replacement, fx r shoulder    OOPHORECTOMY      pt has one ovary, not certain of laterality    ORIF HIP FRACTURE Right     VOLVULUS REDUCTION           Current Outpatient Medications:     albuterol (2 5 mg/3 mL) 0 083 % nebulizer solution, Inhale, Disp: , Rfl:     albuterol (PROVENTIL HFA,VENTOLIN HFA) 90 mcg/act inhaler, Inhale 2 puffs every 6 (six) hours as needed for wheezing, Disp: 1 Inhaler, Rfl: 0    amoxicillin (AMOXIL) 500 mg capsule, Take 4 caps 1 hour before dental appt- tkr (Patient not taking: Reported on 1/22/2020), Disp: 12 capsule, Rfl: 1    Calcium Carbonate-Vitamin D 600-200 MG-UNIT TABS, Take by mouth, Disp: , Rfl:     fluticasone-vilanterol (BREO ELLIPTA) 100-25 mcg/inh inhaler, Inhale 1 puff daily, Disp: 1 Inhaler, Rfl: 6    loratadine (CLARITIN) 10 mg tablet, Take 10 mg by mouth daily  , Disp: , Rfl:     montelukast (SINGULAIR) 10 mg tablet, Take 1 tablet (10 mg total) by mouth daily at bedtime, Disp: 90 tablet, Rfl: 1    Multiple Vitamin (MULTI VITAMIN DAILY PO), Take 1 tablet by mouth daily, Disp: , Rfl:     omeprazole (PriLOSEC) 20 mg delayed release capsule, Take 1 capsule (20 mg total) by mouth 2 (two) times a day, Disp: 180 capsule, Rfl: 1    Current Facility-Administered Medications:     bupivacaine (PF) (MARCAINE) 0 25 % injection 20 mL, 20 mL, Intra-articular, Once, Jeremy Regan DO    iohexol (OMNIPAQUE) 300 mg/mL injection 50 mL, 50 mL, Intra-articular, Once, Jeremy Regan DO    lidocaine (PF) (XYLOCAINE-MPF) 1 % injection 5 mL, 5 mL, Other, Once, Jeremy Regan DO    methylPREDNISolone acetate (DEPO-MEDROL) injection 80 mg, 80 mg, Intra-articular, Once, Jeremy Regan DO    Allergies   Allergen Reactions    Codeine Tachycardia     Has tolerated promethazine with codeine cough syrup    Etodolac      Annotation - [id number]: RASH    Tetracyclines & Related      Annotation - [id number]: RASH    Erythromycin Rash       Physical Exam:   General: Awake, Alert, Oriented x 3, Mood and affect appropriate  Respiratory: Respirations even and unlabored  Cardiovascular: Peripheral pulses intact; no edema  Musculoskeletal Exam:  Decreased range of motion lumbar spine    ASA Score: II         Assessment:   1   Sacroiliitis ([facility] ) - Left        Plan: Left sacroiliac joint injection with pain diary

## 2020-01-30 ENCOUNTER — TELEPHONE (OUTPATIENT)
Dept: PAIN MEDICINE | Facility: CLINIC | Age: 70
End: 2020-01-30

## 2020-01-30 NOTE — TELEPHONE ENCOUNTER
----- Message from Elverna Plant, DO sent at 1/30/2020 11:51 AM EST -----  X-ray hip:Unremarkable left hip exam     Continue with sacroiliac joint injection as planned

## 2020-01-30 NOTE — TELEPHONE ENCOUNTER
S/w pt, advised of above  Will assess relief s/p SIJ inj of 1/29 at her fu phone call next week  Pt verbalized understanding and appreciation  Will cb if questions / concerns arise

## 2020-02-05 ENCOUNTER — TELEPHONE (OUTPATIENT)
Dept: PAIN MEDICINE | Facility: CLINIC | Age: 70
End: 2020-02-05

## 2020-02-07 NOTE — TELEPHONE ENCOUNTER
Spoke with pt who states that she has 90% relief and 0-1 pain level at times  Pt states that her pain has now moved more to the left side and would like to know if there is something that she can do  Would like to know if there are any SIJ exercises that she can do or if she should hold off? Should she start PT or hold off on that?

## 2020-02-07 NOTE — TELEPHONE ENCOUNTER
Left a detailed message on machine per medical communication consent on file advising of above  prvided cb number and office hours

## 2020-02-20 DIAGNOSIS — K21.9 GASTROESOPHAGEAL REFLUX DISEASE, ESOPHAGITIS PRESENCE NOT SPECIFIED: ICD-10-CM

## 2020-02-21 RX ORDER — OMEPRAZOLE 20 MG/1
CAPSULE, DELAYED RELEASE ORAL
Qty: 180 CAPSULE | Refills: 3 | Status: SHIPPED | OUTPATIENT
Start: 2020-02-21 | End: 2021-03-23 | Stop reason: SDUPTHER

## 2020-02-24 ENCOUNTER — OFFICE VISIT (OUTPATIENT)
Dept: FAMILY MEDICINE CLINIC | Facility: HOSPITAL | Age: 70
End: 2020-02-24
Payer: MEDICARE

## 2020-02-24 VITALS
WEIGHT: 194 LBS | RESPIRATION RATE: 16 BRPM | DIASTOLIC BLOOD PRESSURE: 88 MMHG | HEART RATE: 88 BPM | TEMPERATURE: 99 F | HEIGHT: 65 IN | BODY MASS INDEX: 32.32 KG/M2 | SYSTOLIC BLOOD PRESSURE: 122 MMHG

## 2020-02-24 DIAGNOSIS — J44.9 CHRONIC OBSTRUCTIVE PULMONARY DISEASE, UNSPECIFIED COPD TYPE (HCC): ICD-10-CM

## 2020-02-24 DIAGNOSIS — H35.3290 EXUDATIVE AGE-RELATED MACULAR DEGENERATION, UNSPECIFIED LATERALITY, UNSPECIFIED STAGE (HCC): ICD-10-CM

## 2020-02-24 DIAGNOSIS — J32.9 SINUSITIS, UNSPECIFIED CHRONICITY, UNSPECIFIED LOCATION: Primary | ICD-10-CM

## 2020-02-24 PROCEDURE — 1036F TOBACCO NON-USER: CPT | Performed by: PHYSICIAN ASSISTANT

## 2020-02-24 PROCEDURE — 3008F BODY MASS INDEX DOCD: CPT | Performed by: PHYSICIAN ASSISTANT

## 2020-02-24 PROCEDURE — 99213 OFFICE O/P EST LOW 20 MIN: CPT | Performed by: PHYSICIAN ASSISTANT

## 2020-02-24 PROCEDURE — 1160F RVW MEDS BY RX/DR IN RCRD: CPT | Performed by: PHYSICIAN ASSISTANT

## 2020-02-24 PROCEDURE — 4040F PNEUMOC VAC/ADMIN/RCVD: CPT | Performed by: PHYSICIAN ASSISTANT

## 2020-02-24 RX ORDER — CEFUROXIME AXETIL 250 MG/1
250 TABLET ORAL EVERY 12 HOURS SCHEDULED
Qty: 20 TABLET | Refills: 0 | Status: SHIPPED | OUTPATIENT
Start: 2020-02-24 | End: 2020-03-05

## 2020-02-24 NOTE — PROGRESS NOTES
Assessment/Plan:    No problem-specific Assessment & Plan notes found for this encounter  Diagnoses and all orders for this visit:    Sinusitis, unspecified chronicity, unspecified location  -     cefuroxime (CEFTIN) 250 mg tablet; Take 1 tablet (250 mg total) by mouth every 12 (twelve) hours for 10 days        Chronic obstructive pulmonary disease, unspecified COPD type ([facility] )- continue inhalers  Subjective:      Patient ID: Serge Caraballo is a 77 y o  female  77year old white female c/o sore throat, cough and congestion since last week, Thursday, 2/20/2020  Cough productive of yellow phlegm  Also has runny nose, and sneezing  Symptoms got worse past weekend, and had headache  Review of Systems   Constitutional: Positive for chills, diaphoresis, fatigue and fever  HENT: Positive for congestion, postnasal drip, rhinorrhea, sinus pressure, sinus pain and sore throat  Negative for ear pain  Respiratory: Positive for cough  Negative for chest tightness and shortness of breath  Neurological: Positive for dizziness, light-headedness and headaches  Objective:      /88   Pulse 88   Temp 99 °F (37 2 °C) (Tympanic)   Resp 16   Ht 5' 5" (1 651 m)   Wt 88 kg (194 lb)   LMP  (LMP Unknown)   BMI 32 28 kg/m²          Physical Exam   Constitutional: She is oriented to person, place, and time  She appears well-developed and well-nourished  No distress  HENT:   Head: Normocephalic and atraumatic  Nose: Nose normal    Mouth/Throat: Oropharynx is clear and moist  No oropharyngeal exudate  TM bulging and erythematous  Pulmonary/Chest: Effort normal and breath sounds normal  No stridor  No respiratory distress  She has no wheezes  Neurological: She is alert and oriented to person, place, and time  Skin: She is not diaphoretic  BMI Counseling: Body mass index is 32 28 kg/m²   The BMI is above normal  Nutrition recommendations include 3-5 servings of fruits/vegetables daily

## 2020-02-24 NOTE — PATIENT INSTRUCTIONS
Obesity   AMBULATORY CARE:   Obesity  is when your body mass index (BMI) is greater than 30  Your healthcare provider will use your height and weight to measure your BMI  The risks of obesity include  many health problems, such as injuries or physical disability  You may need tests to check for the following:  · Diabetes     · High blood pressure or high cholesterol     · Heart disease     · Gallbladder or liver disease     · Cancer of the colon, breast, prostate, liver, or kidney     · Sleep apnea     · Arthritis or gout  Seek care immediately if:   · You have a severe headache, confusion, or difficulty speaking  · You have weakness on one side of your body  · You have chest pain, sweating, or shortness of breath  Contact your healthcare provider if:   · You have symptoms of gallbladder or liver disease, such as pain in your upper abdomen  · You have knee or hip pain and discomfort while walking  · You have symptoms of diabetes, such as intense hunger and thirst, and frequent urination  · You have symptoms of sleep apnea, such as snoring or daytime sleepiness  · You have questions or concerns about your condition or care  Treatment for obesity  focuses on helping you lose weight to improve your health  Even a small decrease in BMI can reduce the risk for many health problems  Your healthcare provider will help you set a weight-loss goal   · Lifestyle changes  are the first step in treating obesity  These include making healthy food choices and getting regular physical activity  Your healthcare provider may suggest a weight-loss program that involves coaching, education, and therapy  · Medicine  may help you lose weight when it is used with a healthy diet and physical activity  · Surgery  can help you lose weight if you are very obese and have other health problems  There are several types of weight-loss surgery  Ask your healthcare provider for more information    Be successful losing weight:   · Set small, realistic goals  An example of a small goal is to walk for 20 minutes 5 days a week  Anther goal is to lose 5% of your body weight  · Tell friends, family members, and coworkers about your goals  and ask for their support  Ask a friend to lose weight with you, or join a weight-loss support group  · Identify foods or triggers that may cause you to overeat , and find ways to avoid them  Remove tempting high-calorie foods from your home and workplace  Place a bowl of fresh fruit on your kitchen counter  If stress causes you to eat, then find other ways to cope with stress  · Keep a diary to track what you eat and drink  Also write down how many minutes of physical activity you do each day  Weigh yourself once a week and record it in your diary  Eating changes: You will need to eat 500 to 1,000 fewer calories each day than you currently eat to lose 1 to 2 pounds a week  The following changes will help you cut calories:  · Eat smaller portions  Use small plates, no larger than 9 inches in diameter  Fill your plate half full of fruits and vegetables  Measure your food using measuring cups until you know what a serving size looks like  · Eat 3 meals and 1 or 2 snacks each day  Plan your meals in advance  Nehemiah Rey and eat at home most of the time  Eat slowly  · Eat fruits and vegetables at every meal   They are low in calories and high in fiber, which makes you feel full  Do not add butter, margarine, or cream sauce to vegetables  Use herbs to season steamed vegetables  · Eat less fat and fewer fried foods  Eat more baked or grilled chicken and fish  These protein sources are lower in calories and fat than red meat  Limit fast food  Dress your salads with olive oil and vinegar instead of bottled dressing  · Limit the amount of sugar you eat  Do not drink sugary beverages  Limit alcohol  Activity changes:  Physical activity is good for your body in many ways   It helps you burn calories and build strong muscles  It decreases stress and depression, and improves your mood  It can also help you sleep better  Talk to your healthcare provider before you begin an exercise program   · Exercise for at least 30 minutes 5 days a week  Start slowly  Set aside time each day for physical activity that you enjoy and that is convenient for you  It is best to do both weight training and an activity that increases your heart rate, such as walking, bicycling, or swimming  · Find ways to be more active  Do yard work and housecleaning  Walk up the stairs instead of using elevators  Spend your leisure time going to events that require walking, such as outdoor festivals or fairs  This extra physical activity can help you lose weight and keep it off  Follow up with your healthcare provider as directed: You may need to meet with a dietitian  Write down your questions so you remember to ask them during your visits  © 2017 [address] [employer] Information is for End User's use only and may not be sold, redistributed or otherwise used for commercial purposes  All illustrations and images included in CareNotes® are the copyrighted property of [employer] , [employer]  or Lorenzo Page  The above information is an  only  It is not intended as medical advice for individual conditions or treatments  Talk to your doctor, nurse or pharmacist before following any medical regimen to see if it is safe and effective for you  Weight Management   AMBULATORY CARE:   Why it is important to manage your weight:  Being overweight increases your risk of health conditions such as heart disease, high blood pressure, type 2 diabetes, and certain types of cancer  It can also increase your risk for osteoarthritis, sleep apnea, and other respiratory problems  Aim for a slow, steady weight loss  Even a small amount of weight loss can lower your risk of health problems    How to lose weight safely:  A safe and healthy way to lose weight is to eat fewer calories and get regular exercise  You can lose up about 1 pound a week by decreasing the number of calories you eat by 500 calories each day  You can decrease calories by eating smaller portion sizes or by cutting out high-calorie foods  Read labels to find out how many calories are in the foods you eat  You can also burn calories with exercise such as walking, swimming, or biking  You will be more likely to keep weight off if you make these changes part of your lifestyle  Healthy meal plan for weight management:  A healthy meal plan includes a variety of foods, contains fewer calories, and helps you stay healthy  A healthy meal plan includes the following:  · Eat whole-grain foods more often  A healthy meal plan should contain fiber  Fiber is the part of grains, fruits, and vegetables that is not broken down by your body  Whole-grain foods are healthy and provide extra fiber in your diet  Some examples of whole-grain foods are whole-wheat breads and pastas, oatmeal, brown rice, and bulgur  · Eat a variety of vegetables every day  Include dark, leafy greens such as spinach, kale, jessica greens, and mustard greens  Eat yellow and orange vegetables such as carrots, sweet potatoes, and winter squash  · Eat a variety of fruits every day  Choose fresh or canned fruit (canned in its own juice or light syrup) instead of juice  Fruit juice has very little or no fiber  · Eat low-fat dairy foods  Drink fat-free (skim) milk or 1% milk  Eat fat-free yogurt and low-fat cottage cheese  Try low-fat cheeses such as mozzarella and other reduced-fat cheeses  · Choose meat and other protein foods that are low in fat  Choose beans or other legumes such as split peas or lentils  Choose fish, skinless poultry (chicken or turkey), or lean cuts of red meat (beef or pork)  Before you cook meat or poultry, cut off any visible fat  · Use less fat and oil  Try baking foods instead of frying them  Add less fat, such as margarine, sour cream, regular salad dressing and mayonnaise to foods  Eat fewer high-fat foods  Some examples of high-fat foods include french fries, doughnuts, ice cream, and cakes  · Eat fewer sweets  Limit foods and drinks that are high in sugar  This includes candy, cookies, regular soda, and sweetened drinks  Ways to decrease calories:   · Eat smaller portions  ¨ Use a small plate with smaller servings  ¨ Do not eat second helpings  ¨ When you eat at a restaurant, ask for a box and place half of your meal in the box before you eat  ¨ Share an entrée with someone else  · Replace high-calorie snacks with healthy, low-calorie snacks  ¨ Choose fresh fruit, vegetables, fat-free rice cakes, or air-popped popcorn instead of potato chips, nuts, or chocolate  ¨ Choose water or calorie-free drinks instead of soda or sweetened drinks  · Eat regular meals  Skipping meals can lead to overeating later in the day  Eat a healthy snack in place of a meal if you do not have time to eat a regular meal      · Do not shop for groceries when you are hungry  You may be more likely to make unhealthy food choices  Take a grocery list of healthy foods and shop after you have eaten  Exercise:  Exercise at least 30 minutes per day on most days of the week  Some examples of exercise include walking, biking, dancing, and swimming  You can also fit in more physical activity by taking the stairs instead of the elevator or parking farther away from stores  Ask your healthcare provider about the best exercise plan for you  Other things to consider as you try to lose weight:   · Be aware of situations that may give you the urge to overeat, such as eating while watching television  Find ways to avoid these situations  For example, read a book, go for a walk, or do crafts      · Meet with a weight loss support group or friends who are also trying to lose weight  This may help you stay motivated to continue working on your weight loss goals  © 2017 [address] [employer]  Information is for End User's use only and may not be sold, redistributed or otherwise used for commercial purposes  All illustrations and images included in CareNotes® are the copyrighted property of [employer]  or Lorenzo Page  The above information is an  only  It is not intended as medical advice for individual conditions or treatments  Talk to your doctor, nurse or pharmacist before following any medical regimen to see if it is safe and effective for you  Start Ceftin 250 mg  Bid for 10 days, and saline nasal flushes  Use inhalers as needed

## 2020-03-06 ENCOUNTER — OFFICE VISIT (OUTPATIENT)
Dept: PAIN MEDICINE | Facility: CLINIC | Age: 70
End: 2020-03-06
Payer: MEDICARE

## 2020-03-06 VITALS
BODY MASS INDEX: 33.15 KG/M2 | SYSTOLIC BLOOD PRESSURE: 122 MMHG | HEIGHT: 65 IN | WEIGHT: 199 LBS | HEART RATE: 84 BPM | DIASTOLIC BLOOD PRESSURE: 82 MMHG

## 2020-03-06 DIAGNOSIS — M54.50 CHRONIC LEFT-SIDED LOW BACK PAIN WITHOUT SCIATICA: Primary | ICD-10-CM

## 2020-03-06 DIAGNOSIS — M46.1 SACROILIITIS (HCC): ICD-10-CM

## 2020-03-06 DIAGNOSIS — M53.3 SACROILIAC JOINT DYSFUNCTION OF LEFT SIDE: ICD-10-CM

## 2020-03-06 DIAGNOSIS — M47.816 SPONDYLOSIS OF LUMBAR REGION WITHOUT MYELOPATHY OR RADICULOPATHY: ICD-10-CM

## 2020-03-06 DIAGNOSIS — G89.29 CHRONIC LEFT-SIDED LOW BACK PAIN WITHOUT SCIATICA: Primary | ICD-10-CM

## 2020-03-06 PROCEDURE — 1036F TOBACCO NON-USER: CPT | Performed by: NURSE PRACTITIONER

## 2020-03-06 PROCEDURE — 3008F BODY MASS INDEX DOCD: CPT | Performed by: NURSE PRACTITIONER

## 2020-03-06 PROCEDURE — 1160F RVW MEDS BY RX/DR IN RCRD: CPT | Performed by: NURSE PRACTITIONER

## 2020-03-06 PROCEDURE — 99213 OFFICE O/P EST LOW 20 MIN: CPT | Performed by: NURSE PRACTITIONER

## 2020-03-06 PROCEDURE — 4040F PNEUMOC VAC/ADMIN/RCVD: CPT | Performed by: NURSE PRACTITIONER

## 2020-03-06 NOTE — PROGRESS NOTES
Assessment:  1  Chronic left-sided low back pain without sciatica    2  Sacroiliitis ([address] )    3  Spondylosis of lumbar region without myelopathy or radiculopathy    4  Sacroiliac joint dysfunction of left side        Plan:  While the patient was in the office today, I did discuss with the patient at this point time since she is noting significant and stable relief as a result of the left sacroiliac joint injection, our goal would be to try to hold off any repeat injections for at least 3-4 months, if not longer  I explained the patient that we would have to wait at the very earliest early May and hopefully later to consider repeat left SI joint injection of her pain symptoms should start to return  The patient was agreeable and verbalized an understanding  I did encourage the patient to try to be more diligent about a home exercise and stretching program and explained her that she should sit with 20 minutes of low heat on her back before she does her home exercise and stretching program as warm muscle stretch better than cold muscles  I did give the patient a list of home exercises and advised her to slowly and steadily increase her activity, as tolerated, allowing pain be her guide  The patient was agreeable and verbalized an understanding  I discussed with the patient that at this point time she can followup with our office on an as-needed basis  I did review the patient that if her pain symptoms should change, worsen, and/or if she would experience any new symptoms as she would like to be evaluated for, she should give our office a call  The patient was agreeable and verbalized an understanding  History of Present Illness: The patient is a 77 y o  female last seen on 1/29/2020 who presents for a follow up office visit in regards to chronic left-sided low back and SI joint pain secondary to sacroiliac joint dysfunction and spondylosis    The patient currently reports that since her last office visit as she is status post a left sacroiliac joint injection on January 29, 2020 with Dr Neeru Lopez, she is currently noting at least 85% overall improvement in her pain symptoms as a result of the injection and although she does have certain activities such as vacuuming that do seem to irritate the pain, she is able to, down with some rest, ice, and heat  I have personally reviewed and/or updated the patient's past medical history, past surgical history, family history, social history, current medications, allergies, and vital signs today  Review of Systems:    Review of Systems   Respiratory: Negative for shortness of breath  Cardiovascular: Negative for chest pain  Gastrointestinal: Negative for constipation, diarrhea, nausea and vomiting  Musculoskeletal: Negative for arthralgias, gait problem, joint swelling (joint stiffness) and myalgias  Skin: Negative for rash  Neurological: Negative for dizziness, seizures and weakness  All other systems reviewed and are negative          Past Medical History:   Diagnosis Date    Asthma     [facility] [address] [address]     Closed femur fracture [address] )     and humerus resolved: 1/13/2017    [facility]     GERD (gastroesophageal reflux disease)     Influenza, pneumonia     resolved: 4/20/2017    Laryngeal spasm 2/22/2016    Leukocytosis 2/22/2016    Thyroid disorder     suppressed TSH    Traumatic arthropathy of left shoulder        Past Surgical History:   Procedure Laterality Date    BREAST BIOPSY      pt unsure of laterality or dates    BREAST CYST ASPIRATION      pt unsure of laterality or date    CHOLECYSTECTOMY      COLON SURGERY      ERCP  06/2014    HYSTERECTOMY      approx age 28   [place] [address]  r hip replacement, fx r shoulder    OOPHORECTOMY      pt has one ovary, not certain of laterality    ORIF HIP FRACTURE Right     VOLVULUS REDUCTION         Family History   Problem Relation Age of Onset    Pulmonary embolism Daughter     Crohn's disease Daughter     Pancreatic cancer Mother     Heart disease Father     Heart attack Family     No Known Problems Sister     Breast cancer Maternal Aunt 76    No Known Problems Sister     Substance Abuse Neg Hx         neg fam hx    Mental illness Neg Hx         neg fam hx       Social History     Occupational History    Not on file   Tobacco Use    Smoking status: Never Smoker    Smokeless tobacco: Never Used   Substance and Sexual Activity    Alcohol use: Yes     Frequency: Monthly or less     Drinks per session: 1 or 2     Binge frequency: Never     Comment: occasional    Drug use: No    Sexual activity: Not Currently         Current Outpatient Medications:     albuterol (2 5 mg/3 mL) 0 083 % nebulizer solution, Inhale, Disp: , Rfl:     albuterol (PROVENTIL HFA,VENTOLIN HFA) 90 mcg/act inhaler, Inhale 2 puffs every 6 (six) hours as needed for wheezing, Disp: 1 Inhaler, Rfl: 0    Calcium Carbonate-Vitamin D 600-200 MG-UNIT TABS, Take by mouth 1 daily, Disp: , Rfl:     loratadine (CLARITIN) 10 mg tablet, Take 10 mg by mouth daily  , Disp: , Rfl:     montelukast (SINGULAIR) 10 mg tablet, Take 1 tablet (10 mg total) by mouth daily at bedtime, Disp: 90 tablet, Rfl: 1    Multiple Vitamin (MULTI VITAMIN DAILY PO), Take 1 tablet by mouth daily, Disp: , Rfl:     omeprazole (PriLOSEC) 20 mg delayed release capsule, Take 1 capsule by mouth twice a day, Disp: 180 capsule, Rfl: 3    Allergies   Allergen Reactions    Codeine Tachycardia     Has tolerated promethazine with codeine cough syrup    Etodolac      Annotation - [id number]: RASH    Tetracyclines & Related      Annotation - [id number]: RASH    Erythromycin Rash       Physical Exam:    /82 (BP Location: Left arm, Patient Position: Sitting, Cuff Size: Standard)   Pulse 84   Ht 5' 5" (1 651 m)   Wt 90 3 kg (199 lb)   LMP  (LMP Unknown)   BMI 33 12 kg/m²     Constitutional:normal, well developed, well nourished, alert, in no distress and non-toxic and no overt pain behavior  and overweight  Eyes:anicteric  HEENT:grossly intact  Neck:supple, symmetric, trachea midline and no masses   Pulmonary:even and unlabored  Cardiovascular:No edema or pitting edema present  Skin:Normal without rashes or lesions and well hydrated  Psychiatric:Mood and affect appropriate  Neurologic:Cranial Nerves II-XII grossly intact  Musculoskeletal:normal      Imaging  No orders to display         No orders of the defined types were placed in this encounter

## 2020-05-20 ENCOUNTER — TELEPHONE (OUTPATIENT)
Dept: PAIN MEDICINE | Facility: MEDICAL CENTER | Age: 70
End: 2020-05-20

## 2020-05-20 DIAGNOSIS — M53.3 SACROILIAC JOINT DYSFUNCTION OF LEFT SIDE: ICD-10-CM

## 2020-05-20 DIAGNOSIS — M46.1 SACROILIITIS (HCC): Primary | ICD-10-CM

## 2020-05-21 ENCOUNTER — TELEPHONE (OUTPATIENT)
Dept: PAIN MEDICINE | Facility: CLINIC | Age: 70
End: 2020-05-21

## 2020-06-03 ENCOUNTER — OFFICE VISIT (OUTPATIENT)
Dept: OBGYN CLINIC | Facility: CLINIC | Age: 70
End: 2020-06-03
Payer: MEDICARE

## 2020-06-03 ENCOUNTER — APPOINTMENT (OUTPATIENT)
Dept: RADIOLOGY | Facility: CLINIC | Age: 70
End: 2020-06-03
Payer: MEDICARE

## 2020-06-03 VITALS
SYSTOLIC BLOOD PRESSURE: 141 MMHG | DIASTOLIC BLOOD PRESSURE: 75 MMHG | WEIGHT: 203.8 LBS | BODY MASS INDEX: 33.95 KG/M2 | HEIGHT: 65 IN | TEMPERATURE: 99.4 F

## 2020-06-03 DIAGNOSIS — M23.91 INTERNAL DERANGEMENT OF KNEE JOINT, RIGHT: Primary | ICD-10-CM

## 2020-06-03 DIAGNOSIS — M25.561 RIGHT KNEE PAIN, UNSPECIFIED CHRONICITY: ICD-10-CM

## 2020-06-03 PROCEDURE — 4040F PNEUMOC VAC/ADMIN/RCVD: CPT | Performed by: ORTHOPAEDIC SURGERY

## 2020-06-03 PROCEDURE — 1036F TOBACCO NON-USER: CPT | Performed by: ORTHOPAEDIC SURGERY

## 2020-06-03 PROCEDURE — 3008F BODY MASS INDEX DOCD: CPT | Performed by: ORTHOPAEDIC SURGERY

## 2020-06-03 PROCEDURE — 99203 OFFICE O/P NEW LOW 30 MIN: CPT | Performed by: ORTHOPAEDIC SURGERY

## 2020-06-03 PROCEDURE — 73564 X-RAY EXAM KNEE 4 OR MORE: CPT

## 2020-06-03 PROCEDURE — 1160F RVW MEDS BY RX/DR IN RCRD: CPT | Performed by: ORTHOPAEDIC SURGERY

## 2020-06-05 ENCOUNTER — HOSPITAL ENCOUNTER (OUTPATIENT)
Dept: RADIOLOGY | Facility: CLINIC | Age: 70
Discharge: HOME | End: 2020-06-05
Attending: ANESTHESIOLOGY | Admitting: ANESTHESIOLOGY
Payer: MEDICARE

## 2020-06-05 VITALS
SYSTOLIC BLOOD PRESSURE: 143 MMHG | TEMPERATURE: 97.8 F | DIASTOLIC BLOOD PRESSURE: 77 MMHG | RESPIRATION RATE: 18 BRPM | OXYGEN SATURATION: 97 % | HEART RATE: 84 BPM

## 2020-06-05 DIAGNOSIS — M53.3 SACROILIAC JOINT DYSFUNCTION OF LEFT SIDE: ICD-10-CM

## 2020-06-05 DIAGNOSIS — M46.1 SACROILIITIS (HCC): ICD-10-CM

## 2020-06-05 PROCEDURE — 27096 INJECT SACROILIAC JOINT: CPT | Performed by: ANESTHESIOLOGY

## 2020-06-05 RX ORDER — METHYLPREDNISOLONE ACETATE 80 MG/ML
80 INJECTION, SUSPENSION INTRA-ARTICULAR; INTRALESIONAL; INTRAMUSCULAR; PARENTERAL; SOFT TISSUE ONCE
Status: COMPLETED | OUTPATIENT
Start: 2020-06-05 | End: 2020-06-05

## 2020-06-05 RX ORDER — LIDOCAINE HYDROCHLORIDE 10 MG/ML
5 INJECTION, SOLUTION EPIDURAL; INFILTRATION; INTRACAUDAL; PERINEURAL ONCE
Status: COMPLETED | OUTPATIENT
Start: 2020-06-05 | End: 2020-06-05

## 2020-06-05 RX ADMIN — METHYLPREDNISOLONE ACETATE 80 MG: 80 INJECTION, SUSPENSION INTRA-ARTICULAR; INTRALESIONAL; INTRAMUSCULAR; SOFT TISSUE at 10:05

## 2020-06-05 RX ADMIN — LIDOCAINE HYDROCHLORIDE 3 ML: 10 INJECTION, SOLUTION EPIDURAL; INFILTRATION; INTRACAUDAL; PERINEURAL at 10:02

## 2020-06-05 RX ADMIN — LIDOCAINE HYDROCHLORIDE 3 ML: 20 INJECTION, SOLUTION EPIDURAL; INFILTRATION; INTRACAUDAL at 10:04

## 2020-06-05 RX ADMIN — IOHEXOL 1 ML: 300 INJECTION, SOLUTION INTRAVENOUS at 10:02

## 2020-06-09 ENCOUNTER — HOSPITAL ENCOUNTER (OUTPATIENT)
Dept: MRI IMAGING | Facility: HOSPITAL | Age: 70
Discharge: HOME | End: 2020-06-09
Attending: ORTHOPAEDIC SURGERY
Payer: MEDICARE

## 2020-06-09 DIAGNOSIS — M23.91 INTERNAL DERANGEMENT OF KNEE JOINT, RIGHT: ICD-10-CM

## 2020-06-09 PROCEDURE — 73721 MRI JNT OF LWR EXTRE W/O DYE: CPT

## 2020-06-10 ENCOUNTER — OFFICE VISIT (OUTPATIENT)
Dept: OBGYN CLINIC | Facility: CLINIC | Age: 70
End: 2020-06-10
Payer: MEDICARE

## 2020-06-10 VITALS — BODY MASS INDEX: 32.89 KG/M2 | HEIGHT: 66 IN | TEMPERATURE: 98.7 F

## 2020-06-10 DIAGNOSIS — S82.191A OTHER CLOSED FRACTURE OF PROXIMAL END OF RIGHT TIBIA, INITIAL ENCOUNTER: Primary | ICD-10-CM

## 2020-06-10 PROBLEM — S82.101A CLOSED FRACTURE OF RIGHT PROXIMAL TIBIA: Status: ACTIVE | Noted: 2020-06-10

## 2020-06-10 PROCEDURE — 1160F RVW MEDS BY RX/DR IN RCRD: CPT | Performed by: ORTHOPAEDIC SURGERY

## 2020-06-10 PROCEDURE — 99213 OFFICE O/P EST LOW 20 MIN: CPT | Performed by: ORTHOPAEDIC SURGERY

## 2020-06-10 PROCEDURE — 27530 TREAT KNEE FRACTURE: CPT | Performed by: ORTHOPAEDIC SURGERY

## 2020-06-10 PROCEDURE — 4040F PNEUMOC VAC/ADMIN/RCVD: CPT | Performed by: ORTHOPAEDIC SURGERY

## 2020-06-10 PROCEDURE — 1036F TOBACCO NON-USER: CPT | Performed by: ORTHOPAEDIC SURGERY

## 2020-06-12 ENCOUNTER — TELEPHONE (OUTPATIENT)
Dept: PAIN MEDICINE | Facility: CLINIC | Age: 70
End: 2020-06-12

## 2020-06-12 NOTE — TELEPHONE ENCOUNTER
Pt reports 90% improvement post inj   She said her pain normally gets worse later in the day   Pain level towards the evening goes up to 4/10   She currently has no pain   Aware takes up to 2wks for full effect       S/p Lt SIJ INJ 6/5 F/u 7/15

## 2020-07-01 ENCOUNTER — APPOINTMENT (OUTPATIENT)
Dept: LAB | Facility: HOSPITAL | Age: 70
End: 2020-07-01
Attending: INTERNAL MEDICINE
Payer: MEDICARE

## 2020-07-01 ENCOUNTER — OFFICE VISIT (OUTPATIENT)
Dept: OBGYN CLINIC | Facility: CLINIC | Age: 70
End: 2020-07-01

## 2020-07-01 ENCOUNTER — APPOINTMENT (OUTPATIENT)
Dept: RADIOLOGY | Facility: CLINIC | Age: 70
End: 2020-07-01
Payer: MEDICARE

## 2020-07-01 VITALS — SYSTOLIC BLOOD PRESSURE: 116 MMHG | DIASTOLIC BLOOD PRESSURE: 72 MMHG | TEMPERATURE: 98 F

## 2020-07-01 DIAGNOSIS — J45.991 COUGH VARIANT ASTHMA: ICD-10-CM

## 2020-07-01 DIAGNOSIS — S82.191A OTHER CLOSED FRACTURE OF PROXIMAL END OF RIGHT TIBIA, INITIAL ENCOUNTER: ICD-10-CM

## 2020-07-01 DIAGNOSIS — Z82.49 FAMILY HISTORY OF CARDIAC DISORDER IN FATHER: ICD-10-CM

## 2020-07-01 DIAGNOSIS — Z11.59 NEED FOR HEPATITIS C SCREENING TEST: ICD-10-CM

## 2020-07-01 DIAGNOSIS — S82.191A OTHER CLOSED FRACTURE OF PROXIMAL END OF RIGHT TIBIA, INITIAL ENCOUNTER: Primary | ICD-10-CM

## 2020-07-01 LAB
ALBUMIN SERPL BCP-MCNC: 4.1 G/DL (ref 3.5–5)
ALP SERPL-CCNC: 102 U/L (ref 46–116)
ALT SERPL W P-5'-P-CCNC: 31 U/L (ref 12–78)
ANION GAP SERPL CALCULATED.3IONS-SCNC: 8 MMOL/L (ref 4–13)
AST SERPL W P-5'-P-CCNC: 22 U/L (ref 5–45)
BASOPHILS # BLD AUTO: 0.07 THOUSANDS/ΜL (ref 0–0.1)
BASOPHILS NFR BLD AUTO: 1 % (ref 0–1)
BILIRUB SERPL-MCNC: 0.7 MG/DL (ref 0.2–1)
BUN SERPL-MCNC: 21 MG/DL (ref 5–25)
CALCIUM SERPL-MCNC: 9.6 MG/DL (ref 8.3–10.1)
CHLORIDE SERPL-SCNC: 108 MMOL/L (ref 100–108)
CHOLEST SERPL-MCNC: 220 MG/DL (ref 50–200)
CO2 SERPL-SCNC: 23 MMOL/L (ref 21–32)
CREAT SERPL-MCNC: 0.72 MG/DL (ref 0.6–1.3)
EOSINOPHIL # BLD AUTO: 0.23 THOUSAND/ΜL (ref 0–0.61)
EOSINOPHIL NFR BLD AUTO: 3 % (ref 0–6)
ERYTHROCYTE [DISTWIDTH] IN BLOOD BY AUTOMATED COUNT: 12.3 % (ref 11.6–15.1)
GFR SERPL CREATININE-BSD FRML MDRD: 86 ML/MIN/1.73SQ M
GLUCOSE P FAST SERPL-MCNC: 92 MG/DL (ref 65–99)
HCT VFR BLD AUTO: 42.1 % (ref 34.8–46.1)
HCV AB SER QL: NORMAL
HDLC SERPL-MCNC: 55 MG/DL
HGB BLD-MCNC: 14 G/DL (ref 11.5–15.4)
IMM GRANULOCYTES # BLD AUTO: 0.03 THOUSAND/UL (ref 0–0.2)
IMM GRANULOCYTES NFR BLD AUTO: 0 % (ref 0–2)
LDLC SERPL CALC-MCNC: 151 MG/DL (ref 0–100)
LYMPHOCYTES # BLD AUTO: 1.73 THOUSANDS/ΜL (ref 0.6–4.47)
LYMPHOCYTES NFR BLD AUTO: 25 % (ref 14–44)
MCH RBC QN AUTO: 30.8 PG (ref 26.8–34.3)
MCHC RBC AUTO-ENTMCNC: 33.3 G/DL (ref 31.4–37.4)
MCV RBC AUTO: 93 FL (ref 82–98)
MONOCYTES # BLD AUTO: 0.53 THOUSAND/ΜL (ref 0.17–1.22)
MONOCYTES NFR BLD AUTO: 8 % (ref 4–12)
NEUTROPHILS # BLD AUTO: 4.48 THOUSANDS/ΜL (ref 1.85–7.62)
NEUTS SEG NFR BLD AUTO: 63 % (ref 43–75)
NRBC BLD AUTO-RTO: 0 /100 WBCS
PLATELET # BLD AUTO: 322 THOUSANDS/UL (ref 149–390)
PMV BLD AUTO: 9.5 FL (ref 8.9–12.7)
POTASSIUM SERPL-SCNC: 4.2 MMOL/L (ref 3.5–5.3)
PROT SERPL-MCNC: 7.6 G/DL (ref 6.4–8.2)
RBC # BLD AUTO: 4.54 MILLION/UL (ref 3.81–5.12)
SODIUM SERPL-SCNC: 139 MMOL/L (ref 136–145)
TRIGL SERPL-MCNC: 70 MG/DL
WBC # BLD AUTO: 7.07 THOUSAND/UL (ref 4.31–10.16)

## 2020-07-01 PROCEDURE — 1160F RVW MEDS BY RX/DR IN RCRD: CPT | Performed by: ORTHOPAEDIC SURGERY

## 2020-07-01 PROCEDURE — 85025 COMPLETE CBC W/AUTO DIFF WBC: CPT

## 2020-07-01 PROCEDURE — 80053 COMPREHEN METABOLIC PANEL: CPT

## 2020-07-01 PROCEDURE — 80061 LIPID PANEL: CPT

## 2020-07-01 PROCEDURE — 73562 X-RAY EXAM OF KNEE 3: CPT

## 2020-07-01 PROCEDURE — 36415 COLL VENOUS BLD VENIPUNCTURE: CPT

## 2020-07-01 PROCEDURE — 4040F PNEUMOC VAC/ADMIN/RCVD: CPT | Performed by: ORTHOPAEDIC SURGERY

## 2020-07-01 PROCEDURE — 99024 POSTOP FOLLOW-UP VISIT: CPT | Performed by: ORTHOPAEDIC SURGERY

## 2020-07-01 PROCEDURE — 86803 HEPATITIS C AB TEST: CPT

## 2020-07-01 NOTE — ASSESSMENT & PLAN NOTE
Patient has healing proximal right tibial plateau fracture  Patient may start weight-bearing as tolerated right lower extremity in T ROM brace on locked  Patient may start transitioning to a short hinged knee brace when comfortable  Prescription was giving out for short hinged knee brace for the right knee  Patient may exercise: may do range-of-motion exercises of the right knee and use a stationary bike  Follow-up in 4 weeks  All patient's questions were answered to her satisfaction  This note is created using dictation transcription  It may contain typographical errors, grammatical errors, improperly dictated words, background noise and other errors

## 2020-07-01 NOTE — PROGRESS NOTES
Assessment:     1  Other closed fracture of proximal end of right tibia, initial encounter          Plan:     Problem List Items Addressed This Visit        Musculoskeletal and Integument    Closed fracture of right proximal tibia - Primary     Patient has healing proximal right tibial plateau fracture  Patient may start weight-bearing as tolerated right lower extremity in T ROM brace on locked  Patient may start transitioning to a short hinged knee brace when comfortable  Prescription was giving out for short hinged knee brace for the right knee  Patient may exercise: may do range-of-motion exercises of the right knee and use a stationary bike  Follow-up in 4 weeks  All patient's questions were answered to her satisfaction  This note is created using dictation transcription  It may contain typographical errors, grammatical errors, improperly dictated words, background noise and other errors  Relevant Orders    XR knee 3 vw right non injury    Brace         Patient ID: Dang Fox is a 77 y o  female  Chief Complaint:  Right knee pain     Subjective:  43-year-old female here today follow-up for a right proximal  tibial plateau fracture approximately 6 weeks ago  Patient has been nonweightbearing right lower extremity in a T ROM brace  Patient is present in a wheelchair today  Patient states she is doing well  She denies having any pain or discomfort in the right knee  She has been elevating when needed  She is taking Tylenol 1000 mg p r n  For pain  Denies any numbness or tingling  Allergy:  Allergies   Allergen Reactions    Codeine Tachycardia     Has tolerated promethazine with codeine cough syrup    Etodolac      Annotation - [id number]: RASH    Tetracyclines & Related      Annotation - [id number]: RASH    Erythromycin Rash       Medications:  all current active meds have been reviewed and current meds:       ROS:  Review of Systems   Constitutional: Negative    Negative for chills, fever and unexpected weight change  HENT: Negative  Negative for hearing loss, nosebleeds and postnasal drip  Eyes: Negative  Negative for pain, redness and visual disturbance  Respiratory: Negative  Negative for cough, shortness of breath and wheezing  Cardiovascular: Negative  Negative for chest pain, palpitations and leg swelling  Gastrointestinal: Negative  Negative for abdominal pain, nausea and vomiting  Endocrine: Negative for polydipsia and polyuria  Genitourinary: Negative for dysuria  Musculoskeletal: Positive for gait problem (Not weight-bearing on the right leg)  Negative for arthralgias and joint swelling  Skin: Negative for rash and wound  Neurological: Negative for dizziness, numbness and headaches  Hematological: Negative  Psychiatric/Behavioral: Negative for decreased concentration and suicidal ideas  The patient is not nervous/anxious  Objective:  BP Readings from Last 1 Encounters:   07/01/20 116/72      Wt Readings from Last 1 Encounters:   06/03/20 92 4 kg (203 lb 12 8 oz)        Exam:   Physical Exam   Constitutional: She is oriented to person, place, and time  She appears well-developed and well-nourished  HENT:   Head: Normocephalic and atraumatic  Right Ear: External ear normal    Left Ear: External ear normal    Eyes: Conjunctivae and EOM are normal    Neck: Neck supple  Pulmonary/Chest: Effort normal    Musculoskeletal:        Right knee: She exhibits no effusion  Neurological: She is alert and oriented to person, place, and time  Skin: Skin is warm and dry  Psychiatric: She has a normal mood and affect  Her behavior is normal  Thought content normal      Right Knee Exam     Tenderness   The patient is experiencing no tenderness  Range of Motion   The patient has normal right knee ROM      Tests   Varus: negative Valgus: negative  Patellar apprehension: negative    Other   Erythema: absent  Scars: absent  Sensation: normal  Pulse: present  Swelling: none  Effusion: no effusion present            Radiographs:  I have personally reviewed pertinent films in PACS and my interpretation is X-ray right knee demonstrates callus formation over proximal tibial fracture, fracture alignment maintained       Scribe Attestation    I,:   Qi Diaz am acting as a scribe while in the presence of the attending physician :        I,:   Nesha Gtz MD personally performed the services described in this documentation    as scribed in my presence :

## 2020-07-09 ENCOUNTER — OFFICE VISIT (OUTPATIENT)
Dept: FAMILY MEDICINE CLINIC | Facility: HOSPITAL | Age: 70
End: 2020-07-09
Payer: MEDICARE

## 2020-07-09 VITALS
DIASTOLIC BLOOD PRESSURE: 80 MMHG | BODY MASS INDEX: 32.14 KG/M2 | SYSTOLIC BLOOD PRESSURE: 136 MMHG | TEMPERATURE: 99.4 F | WEIGHT: 200 LBS | RESPIRATION RATE: 16 BRPM | HEART RATE: 80 BPM | HEIGHT: 66 IN

## 2020-07-09 DIAGNOSIS — E78.00 PURE HYPERCHOLESTEROLEMIA: ICD-10-CM

## 2020-07-09 DIAGNOSIS — J44.9 CHRONIC OBSTRUCTIVE PULMONARY DISEASE, UNSPECIFIED COPD TYPE (HCC): ICD-10-CM

## 2020-07-09 DIAGNOSIS — E66.09 CLASS 1 OBESITY DUE TO EXCESS CALORIES WITHOUT SERIOUS COMORBIDITY WITH BODY MASS INDEX (BMI) OF 30.0 TO 30.9 IN ADULT: ICD-10-CM

## 2020-07-09 DIAGNOSIS — Z00.00 MEDICARE ANNUAL WELLNESS VISIT, SUBSEQUENT: Primary | ICD-10-CM

## 2020-07-09 PROBLEM — E66.9 CLASS 1 OBESITY WITHOUT SERIOUS COMORBIDITY IN ADULT: Status: ACTIVE | Noted: 2020-07-09

## 2020-07-09 PROCEDURE — 1036F TOBACCO NON-USER: CPT | Performed by: INTERNAL MEDICINE

## 2020-07-09 PROCEDURE — 1125F AMNT PAIN NOTED PAIN PRSNT: CPT | Performed by: INTERNAL MEDICINE

## 2020-07-09 PROCEDURE — 1123F ACP DISCUSS/DSCN MKR DOCD: CPT | Performed by: INTERNAL MEDICINE

## 2020-07-09 PROCEDURE — 1170F FXNL STATUS ASSESSED: CPT | Performed by: INTERNAL MEDICINE

## 2020-07-09 PROCEDURE — G0438 PPPS, INITIAL VISIT: HCPCS | Performed by: INTERNAL MEDICINE

## 2020-07-09 PROCEDURE — 4040F PNEUMOC VAC/ADMIN/RCVD: CPT | Performed by: INTERNAL MEDICINE

## 2020-07-09 PROCEDURE — 1160F RVW MEDS BY RX/DR IN RCRD: CPT | Performed by: INTERNAL MEDICINE

## 2020-07-09 NOTE — PATIENT INSTRUCTIONS
Medicare Preventive Visit Patient Instructions  Thank you for completing your Welcome to Medicare Visit or Medicare Annual Wellness Visit today  Your next wellness visit will be due in one year (7/9/2021)  The screening/preventive services that you may require over the next 5-10 years are detailed below  Some tests may not apply to you based off risk factors and/or age  Screening tests ordered at today's visit but not completed yet may show as past due  Also, please note that scanned in results may not display below  Preventive Screenings:  Service Recommendations Previous Testing/Comments   Colorectal Cancer Screening  * Colonoscopy    * Fecal Occult Blood Test (FOBT)/Fecal Immunochemical Test (FIT)  * Fecal DNA/Cologuard Test  * Flexible Sigmoidoscopy Age: 54-65 years old   Colonoscopy: every 10 years (may be performed more frequently if at higher risk)  OR  FOBT/FIT: every 1 year  OR  Cologuard: every 3 years  OR  Sigmoidoscopy: every 5 years  Screening may be recommended earlier than age 48 if at higher risk for colorectal cancer  Also, an individualized decision between you and your healthcare provider will decide whether screening between the ages of 74-80 would be appropriate  Colonoscopy: 11/23/2015  FOBT/FIT: Not on file  Cologuard: Not on file  Sigmoidoscopy: Not on file    Screening Current     Breast Cancer Screening Age: 36 years old  Frequency: every 1-2 years  Not required if history of left and right mastectomy Mammogram: 10/11/2019    Screening Current   Cervical Cancer Screening Between the ages of 21-29, pap smear recommended once every 3 years  Between the ages of 33-67, can perform pap smear with HPV co-testing every 5 years     Recommendations may differ for women with a history of total hysterectomy, cervical cancer, or abnormal pap smears in past  Pap Smear: Not on file    Screening Not Indicated   Hepatitis C Screening Once for adults born between 1945 and 1965  More frequently in patients at high risk for Hepatitis C Hep C Antibody: 07/01/2020    Screening Current   Diabetes Screening 1-2 times per year if you're at risk for diabetes or have pre-diabetes Fasting glucose: 92 mg/dL   A1C: No results in last 5 years    Screening Current   Cholesterol Screening Once every 5 years if you don't have a lipid disorder  May order more often based on risk factors  Lipid panel: 07/01/2020    Screening Current     Other Preventive Screenings Covered by Medicare:  1  Abdominal Aortic Aneurysm (AAA) Screening: covered once if your at risk  You're considered to be at risk if you have a family history of AAA  2  Lung Cancer Screening: covers low dose CT scan once per year if you meet all of the following conditions: (1) Age 50-69; (2) No signs or symptoms of lung cancer; (3) Current smoker or have quit smoking within the last 15 years; (4) You have a tobacco smoking history of at least 30 pack years (packs per day multiplied by number of years you smoked); (5) You get a written order from a healthcare provider  3  Glaucoma Screening: covered annually if you're considered high risk: (1) You have diabetes OR (2) Family history of glaucoma OR (3)  aged 48 and older OR (3)  American aged 72 and older  3  Osteoporosis Screening: covered every 2 years if you meet one of the following conditions: (1) You're estrogen deficient and at risk for osteoporosis based off medical history and other findings; (2) Have a vertebral abnormality; (3) On glucocorticoid therapy for more than 3 months; (4) Have primary hyperparathyroidism; (5) On osteoporosis medications and need to assess response to drug therapy  · Last bone density test (DXA Scan): 06/28/2017  5  HIV Screening: covered annually if you're between the age of 12-76  Also covered annually if you are younger than 13 and older than 72 with risk factors for HIV infection   For pregnant patients, it is covered up to 3 times per pregnancy  Immunizations:  Immunization Recommendations   Influenza Vaccine Annual influenza vaccination during flu season is recommended for all persons aged >= 6 months who do not have contraindications   Pneumococcal Vaccine (Prevnar and Pneumovax)  * Prevnar = PCV13  * Pneumovax = PPSV23   Adults 25-60 years old: 1-3 doses may be recommended based on certain risk factors  Adults 72 years old: Prevnar (PCV13) vaccine recommended followed by Pneumovax (PPSV23) vaccine  If already received PPSV23 since turning 65, then PCV13 recommended at least one year after PPSV23 dose  Hepatitis B Vaccine 3 dose series if at intermediate or high risk (ex: diabetes, end stage renal disease, liver disease)   Tetanus (Td) Vaccine - COST NOT COVERED BY MEDICARE PART B Following completion of primary series, a booster dose should be given every 10 years to maintain immunity against tetanus  Td may also be given as tetanus wound prophylaxis  Tdap Vaccine - COST NOT COVERED BY MEDICARE PART B Recommended at least once for all adults  For pregnant patients, recommended with each pregnancy  Shingles Vaccine (Shingrix) - COST NOT COVERED BY MEDICARE PART B  2 shot series recommended in those aged 48 and above     Health Maintenance Due:      Topic Date Due    CRC Screening: Colonoscopy  11/23/2020    Hepatitis C Screening  Completed     Immunizations Due:      Topic Date Due    Pneumococcal Vaccine: 65+ Years (2 of 2 - PPSV23) 05/07/2019    Influenza Vaccine  07/01/2020     Advance Directives   What are advance directives? Advance directives are legal documents that state your wishes and plans for medical care  These plans are made ahead of time in case you lose your ability to make decisions for yourself  Advance directives can apply to any medical decision, such as the treatments you want, and if you want to donate organs  What are the types of advance directives?   There are many types of advance directives, and each state has rules about how to use them  You may choose a combination of any of the following:  · Living will: This is a written record of the treatment you want  You can also choose which treatments you do not want, which to limit, and which to stop at a certain time  This includes surgery, medicine, IV fluid, and tube feedings  · Durable power of  for healthcare [facility]): This is a written record that states who you want to make healthcare choices for you when you are unable to make them for yourself  This person, called a proxy, is usually a family member or a friend  You may choose more than 1 proxy  · Do not resuscitate (DNR) order:  A DNR order is used in case your heart stops beating or you stop breathing  It is a request not to have certain forms of treatment, such as CPR  A DNR order may be included in other types of advance directives  · Medical directive: This covers the care that you want if you are in a coma, near death, or unable to make decisions for yourself  You can list the treatments you want for each condition  Treatment may include pain medicine, surgery, blood transfusions, dialysis, IV or tube feedings, and a ventilator (breathing machine)  · Values history: This document has questions about your views, beliefs, and how you feel and think about life  This information can help others choose the care that you would choose  Why are advance directives important? An advance directive helps you control your care  Although spoken wishes may be used, it is better to have your wishes written down  Spoken wishes can be misunderstood, or not followed  Treatments may be given even if you do not want them  An advance directive may make it easier for your family to make difficult choices about your care  Fall Prevention    Fall prevention  includes ways to make your home and other areas safer  It also includes ways you can move more carefully to prevent a fall   Health conditions that cause changes in your blood pressure, vision, or muscle strength and coordination may increase your risk for falls  Medicines may also increase your risk for falls if they make you dizzy, weak, or sleepy  Fall prevention tips:   · Stand or sit up slowly  · Use assistive devices as directed  · Wear shoes that fit well and have soles that   · Wear a personal alarm  · Stay active  · Manage your medical conditions  Home Safety Tips:  · Add items to prevent falls in the bathroom  · Keep paths clear  · Install bright lights in your home  · Keep items you use often on shelves within reach  · Paint or place reflective tape on the edges of your stairs  Urinary Incontinence   Urinary incontinence (UI)  is when you lose control of your bladder  UI develops because your bladder cannot store or empty urine properly  The 3 most common types of UI are stress incontinence, urge incontinence, or both  Medicines:   · May be given to help strengthen your bladder control  Report any side effects of medication to your healthcare provider  Do pelvic muscle exercises often:  Your pelvic muscles help you stop urinating  Squeeze these muscles tight for 5 seconds, then relax for 5 seconds  Gradually work up to squeezing for 10 seconds  Do 3 sets of 15 repetitions a day, or as directed  This will help strengthen your pelvic muscles and improve bladder control  Train your bladder:  Go to the bathroom at set times, such as every 2 hours, even if you do not feel the urge to go  You can also try to hold your urine when you feel the urge to go  For example, hold your urine for 5 minutes when you feel the urge to go  As that becomes easier, hold your urine for 10 minutes  Self-care:   · Keep a UI record  Write down how often you leak urine and how much you leak  Make a note of what you were doing when you leaked urine  · Drink liquids as directed   You may need to limit the amount of liquid you drink to help control your urine leakage  Do not drink any liquid right before you go to bed  Limit or do not have drinks that contain caffeine or alcohol  · Prevent constipation  Eat a variety of high-fiber foods  Good examples are high-fiber cereals, beans, vegetables, and whole-grain breads  Walking is the best way to trigger your intestines to have a bowel movement  · Exercise regularly and maintain a healthy weight  Weight loss and exercise will decrease pressure on your bladder and help you control your leakage  · Use a catheter as directed  to help empty your bladder  A catheter is a tiny, plastic tube that is put into your bladder to drain your urine  · Go to behavior therapy as directed  Behavior therapy may be used to help you learn to control your urge to urinate  Weight Management   Why it is important to manage your weight:  Being overweight increases your risk of health conditions such as heart disease, high blood pressure, type 2 diabetes, and certain types of cancer  It can also increase your risk for osteoarthritis, sleep apnea, and other respiratory problems  Aim for a slow, steady weight loss  Even a small amount of weight loss can lower your risk of health problems  How to lose weight safely:  A safe and healthy way to lose weight is to eat fewer calories and get regular exercise  You can lose up about 1 pound a week by decreasing the number of calories you eat by 500 calories each day  Healthy meal plan for weight management:  A healthy meal plan includes a variety of foods, contains fewer calories, and helps you stay healthy  A healthy meal plan includes the following:  · Eat whole-grain foods more often  A healthy meal plan should contain fiber  Fiber is the part of grains, fruits, and vegetables that is not broken down by your body  Whole-grain foods are healthy and provide extra fiber in your diet   Some examples of whole-grain foods are whole-wheat breads and pastas, oatmeal, brown rice, and bulgur  · Eat a variety of vegetables every day  Include dark, leafy greens such as spinach, kale, jessica greens, and mustard greens  Eat yellow and orange vegetables such as carrots, sweet potatoes, and winter squash  · Eat a variety of fruits every day  Choose fresh or canned fruit (canned in its own juice or light syrup) instead of juice  Fruit juice has very little or no fiber  · Eat low-fat dairy foods  Drink fat-free (skim) milk or 1% milk  Eat fat-free yogurt and low-fat cottage cheese  Try low-fat cheeses such as mozzarella and other reduced-fat cheeses  · Choose meat and other protein foods that are low in fat  Choose beans or other legumes such as split peas or lentils  Choose fish, skinless poultry (chicken or turkey), or lean cuts of red meat (beef or pork)  Before you cook meat or poultry, cut off any visible fat  · Use less fat and oil  Try baking foods instead of frying them  Add less fat, such as margarine, sour cream, regular salad dressing and mayonnaise to foods  Eat fewer high-fat foods  Some examples of high-fat foods include french fries, doughnuts, ice cream, and cakes  · Eat fewer sweets  Limit foods and drinks that are high in sugar  This includes candy, cookies, regular soda, and sweetened drinks  Exercise:  Exercise at least 30 minutes per day on most days of the week  Some examples of exercise include walking, biking, dancing, and swimming  You can also fit in more physical activity by taking the stairs instead of the elevator or parking farther away from stores  Ask your healthcare provider about the best exercise plan for you  © Copyright [employer] 2018 Information is for End User's use only and may not be sold, redistributed or otherwise used for commercial purposes   All illustrations and images included in CareNotes® are the copyrighted property of [employer]  or [facility] [employer]

## 2020-07-09 NOTE — PROGRESS NOTES
Assessment and Plan:     Problem List Items Addressed This Visit        Respiratory    Chronic obstructive pulmonary disease ([address] )       Other    Pure hypercholesterolemia     High ldld- hig risk with both parents with cardiovascular disease  Refer to nutritionist         Relevant Orders    Lipid Panel with Direct LDL reflex    Ambulatory referral to Nutrition Services    Class 1 obesity without serious comorbidity in adult     Had fall on  Right knee tibial plateau- only weight bearing for a week  - was bike riding on a trail         Relevant Orders    Ambulatory referral to Nutrition Services      Other Visit Diagnoses     Medicare annual wellness visit, subsequent    -  Primary           Preventive health issues were discussed with patient, and age appropriate screening tests were ordered as noted in patient's After Visit Summary  Personalized health advice and appropriate referrals for health education or preventive services given if needed, as noted in patient's After Visit Summary  History of Present Illness:     Patient presents for Welcome to Medicare visit       Patient Care Team:  Taryn Morales DO as PCP - General  Taryn Morales DO     Review of Systems:     Review of Systems   Problem List:     Patient Active Problem List   Diagnosis    GERD (gastroesophageal reflux disease)    Cough variant asthma    Allergic rhinitis    Macular degeneration, wet ([address] )    Osteoporosis    Antibiotic prophylaxis for dental procedure indicated due to prior joint replacement    Spondylosis of lumbar region without myelopathy or radiculopathy    Family history of cardiac disorder in father    Sacroiliitis ([address] )    Chronic obstructive pulmonary disease ([address] )    Chronic left-sided low back pain without sciatica    Sacroiliac joint dysfunction of left side    Internal derangement of knee joint, right    Closed fracture of right proximal tibia    Pure hypercholesterolemia    Class 1 obesity without serious comorbidity in adult      Past Medical and Surgical History:     Past Medical History:   Diagnosis Date    Asthma     [facility]     Closed femur fracture ([facility] [facility] )     and humerus resolved: 1/13/2017    COPD (chronic obstructive pulmonary disease) ([facility])     GERD (gastroesophageal reflux disease)     Influenza, pneumonia     resolved: 4/20/2017    Laryngeal spasm 2/22/2016    Leukocytosis 2/22/2016    Thyroid disorder     suppressed TSH    Traumatic arthropathy of left shoulder      Past Surgical History:   Procedure Laterality Date    BREAST BIOPSY      pt unsure of laterality or dates    BREAST CYST ASPIRATION      pt unsure of laterality or date    CHOLECYSTECTOMY      COLON SURGERY      ERCP  06/2014    HYSTERECTOMY      approx age 28   [address]  r hip replacement, fx r shoulder    OOPHORECTOMY      pt has one ovary, not certain of laterality    ORIF HIP FRACTURE Right     VOLVULUS REDUCTION        Family History:     Family History   Problem Relation Age of Onset    Pulmonary embolism Daughter     Crohn's disease Daughter     Pancreatic cancer Mother     Heart disease Father     Heart attack Family     No Known Problems Sister     Breast cancer Maternal Aunt 76    No Known Problems Sister     Substance Abuse Neg Hx         neg fam hx    Mental illness Neg Hx         neg fam hx      Social History:        Social History     Socioeconomic History    Marital status: /Civil Union     Spouse name: None    Number of children: None    Years of education: None    Highest education level: None   Occupational History    None   Social Needs    Financial resource strain: Not hard at all   Kelley-Briseyda insecurity:     Worry: Never true     Inability: Never true    Transportation needs:     Medical: No     Non-medical: No   Tobacco Use    Smoking status: Never Smoker    Smokeless tobacco: Never Used   Substance and Sexual Activity    Alcohol use: Yes     Frequency: Monthly or less Drinks per session: 1 or 2     Binge frequency: Never     Comment: occasional    Drug use: No    Sexual activity: Not Currently   Lifestyle    Physical activity:     Days per week: 3 days     Minutes per session: 30 min    Stress: Not at all   Relationships    Social connections:     Talks on phone: Twice a week     Gets together: Twice a week     Attends Religion service: More than 4 times per year     Active member of club or organization: No     Attends meetings of clubs or organizations: Never     Relationship status:     Intimate partner violence:     Fear of current or ex partner: No     Emotionally abused: No     Physically abused: No     Forced sexual activity: No   Other Topics Concern    None   Social History Narrative    Caffeine use: coffee daily    Cultural background: Not  or [address]  care, regularly    Living situation: supportive and safe    Mo advance directives    Primary spoken language English    Racial background: White    Spouse/family support      Medications and Allergies:     Current Outpatient Medications   Medication Sig Dispense Refill    albuterol (PROVENTIL HFA,VENTOLIN HFA) 90 mcg/act inhaler Inhale 2 puffs every 6 (six) hours as needed for wheezing 1 Inhaler 0    Calcium Carbonate-Vitamin D 600-200 MG-UNIT TABS Take by mouth 1 daily      loratadine (CLARITIN) 10 mg tablet Take 10 mg by mouth daily   montelukast (SINGULAIR) 10 mg tablet Take 1 tablet (10 mg total) by mouth daily at bedtime 90 tablet 1    Multiple Vitamin (MULTI VITAMIN DAILY PO) Take 1 tablet by mouth daily      omeprazole (PriLOSEC) 20 mg delayed release capsule Take 1 capsule by mouth twice a day 180 capsule 3     No current facility-administered medications for this visit        Allergies   Allergen Reactions    Codeine Tachycardia     Has tolerated promethazine with codeine cough syrup    Etodolac      Annotation - [id number]: RASH    Tetracyclines & Related Annotation - [id number]: RASH    Erythromycin Rash      Immunizations:     Immunization History   Administered Date(s) Administered    Influenza Quadrivalent Preservative Free 3 years and older IM 10/22/2015    Influenza TIV (IM) 10/16/2013, 10/06/2014    Influenza, high dose seasonal 0 5 mL 09/26/2018    Influenza, injectable, quadrivalent, preservative free 0 5 mL 10/22/2019    Pneumococcal Conjugate 13-Valent 05/07/2018    Pneumococcal Polysaccharide PPV23 11/07/2012    TD (adult) Preservative Free 01/01/2006    Tdap 10/22/2015    Zoster 06/18/2012    Zoster Vaccine Recombinant 03/03/2019, 06/14/2019      Health Maintenance:         Topic Date Due    CRC Screening: Colonoscopy  11/23/2020    Hepatitis C Screening  Completed         Topic Date Due    Pneumococcal Vaccine: 65+ Years (2 of 2 - PPSV23) 05/07/2019    Influenza Vaccine  07/01/2020      Medicare Screening Tests and Risk Assessments:     Guido Baltazar is here for her Subsequent Wellness visit  Health Risk Assessment:   Patient rates overall health as good  Patient feels that their physical health rating is same  Eyesight was rated as same  Hearing was rated as same  Patient feels that their emotional and mental health rating is same  Pain experienced in the last 7 days has been some  Patient's pain rating has been 3/10  Patient states that she has experienced no weight loss or gain in last 6 months  Depression Screening:   PHQ-2 Score: 0      Fall Risk Screening: In the past year, patient has experienced: history of falling in past year    Number of falls: 1  Injured during fall?: Yes      Urinary Incontinence Screening:   Patient has leaked urine accidently in the last six months  Home Safety:  Patient does not have trouble with stairs inside or outside of their home  Patient has working smoke alarms and has working carbon monoxide detector  Home safety hazards include: none  Nutrition:   Current diet is Regular   Right now pt eats what she wants, but she is thinking of changing her diet  Medications:   Patient is currently taking over-the-counter supplements  OTC medications include: see medication list  Patient is able to manage medications  Activities of Daily Living (ADLs)/Instrumental Activities of Daily Living (IADLs):   Walk and transfer into and out of bed and chair?: Yes  Dress and groom yourself?: Yes    Bathe or shower yourself?: Yes    Feed yourself? Yes  Do your laundry/housekeeping?: Yes  Manage your money, pay your bills and track your expenses?: Yes  Make your own meals?: Yes    Do your own shopping?: Yes    Previous Hospitalizations:   Any hospitalizations or ED visits within the last 12 months?: No      Advance Care Planning:   Living will: Yes    Durable POA for healthcare: Yes    Advanced directive: Yes    Advanced directive counseling given: Yes    End of Life Decisions reviewed with patient: Yes      Comments:  Will bring in copy    Ed is medical poa  Full code but if no hope for recovery would not want long term vent / support    Cognitive Screening:   Provider or family/friend/caregiver concerned regarding cognition?: No    PREVENTIVE SCREENINGS      Cardiovascular Screening:    General: Screening Current      Diabetes Screening:     General: Screening Current      Colorectal Cancer Screening:     General: Screening Current      Breast Cancer Screening:     General: Screening Current      Cervical Cancer Screening:    General: Screening Not Indicated      Osteoporosis Screening:    General: Screening Not Indicated and History Osteoporosis      Lung Cancer Screening:     General: Screening Not Indicated      Hepatitis C Screening:    General: Screening Current    No exam data present     Physical Exam:     /80   Pulse 80   Temp 99 4 °F (37 4 °C) (Tympanic)   Resp 16   Ht 5' 6" (1 676 m)   Wt 90 7 kg (200 lb)   LMP  (LMP Unknown)   BMI 32 28 kg/m²     Physical Exam   Constitutional: She appears well-developed and well-nourished  No distress  HENT:   Head: Normocephalic  Right Ear: External ear normal    Eyes: Right eye exhibits no discharge  Left eye exhibits no discharge  No scleral icterus  Cardiovascular: Normal rate and regular rhythm  No murmur heard  Pulmonary/Chest: Effort normal and breath sounds normal  No respiratory distress  Abdominal: Soft  Bowel sounds are normal  She exhibits no distension  Musculoskeletal: She exhibits tenderness  She exhibits no edema or deformity  Right medial plateau   Lymphadenopathy:     She has cervical adenopathy  Skin: No erythema  Psychiatric: She has a normal mood and affect  Nursing note and vitals reviewed        Katelyn Guidry DO
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
## 2020-07-15 ENCOUNTER — OFFICE VISIT (OUTPATIENT)
Dept: PAIN MEDICINE | Facility: CLINIC | Age: 70
End: 2020-07-15
Payer: MEDICARE

## 2020-07-15 VITALS
TEMPERATURE: 99 F | HEIGHT: 66 IN | BODY MASS INDEX: 31.98 KG/M2 | SYSTOLIC BLOOD PRESSURE: 138 MMHG | WEIGHT: 199 LBS | DIASTOLIC BLOOD PRESSURE: 82 MMHG | HEART RATE: 78 BPM

## 2020-07-15 DIAGNOSIS — M54.50 CHRONIC LEFT-SIDED LOW BACK PAIN WITHOUT SCIATICA: Primary | ICD-10-CM

## 2020-07-15 DIAGNOSIS — M46.1 SACROILIITIS (HCC): ICD-10-CM

## 2020-07-15 DIAGNOSIS — G89.29 CHRONIC LEFT-SIDED LOW BACK PAIN WITHOUT SCIATICA: Primary | ICD-10-CM

## 2020-07-15 DIAGNOSIS — M53.3 SACROILIAC JOINT DYSFUNCTION OF LEFT SIDE: ICD-10-CM

## 2020-07-15 PROCEDURE — 1170F FXNL STATUS ASSESSED: CPT | Performed by: NURSE PRACTITIONER

## 2020-07-15 PROCEDURE — 4040F PNEUMOC VAC/ADMIN/RCVD: CPT | Performed by: NURSE PRACTITIONER

## 2020-07-15 PROCEDURE — 3008F BODY MASS INDEX DOCD: CPT | Performed by: NURSE PRACTITIONER

## 2020-07-15 PROCEDURE — 1036F TOBACCO NON-USER: CPT | Performed by: NURSE PRACTITIONER

## 2020-07-15 PROCEDURE — 99213 OFFICE O/P EST LOW 20 MIN: CPT | Performed by: NURSE PRACTITIONER

## 2020-07-15 PROCEDURE — 1160F RVW MEDS BY RX/DR IN RCRD: CPT | Performed by: NURSE PRACTITIONER

## 2020-07-15 NOTE — PROGRESS NOTES
Assessment:  1  Chronic left-sided low back pain without sciatica    2  Sacroiliitis ([address] )    3  Sacroiliac joint dysfunction of left side        Plan:  I discussed with the patient at this point time since she is noting moderate to significant overall relief of her left-sided low back and SI joint pain as a result of the injection, for now, we will hold off any repeat injections for at least another 2-3 months, if not longer if possible  However, explained the patient that if in 2-3 months her pain symptoms should start to return she could always call our office and we can discuss the possibility of a repeat left SI joint injection at that time if it is appropriate  The patient was agreeable and verbalized an understanding  I also discussed with the patient that if her left SI joint continues to be an issue in the future we could always consider sending her for a consultation with Dr Sarkis Hooper for an evaluation to see if she would be a candidate for a left SI joint fusion  However, at this point the patient was not interested in the SI joint fusion surgical option  I discussed with the patient that at this point time she can followup with our office on an as-needed basis  I did review the patient that if her pain symptoms should change, worsen, and/or if she would experience any new symptoms as she would like to be evaluated for, she should give our office a call  The patient was agreeable and verbalized an understanding  History of Present Illness: The patient is a 77 y o  female last seen on 6/5/2020 who presents for a follow up office visit in regards to chronic left-sided low back pain secondary to sacroiliitis with sacroiliac joint dysfunction on the left    The patient currently reports that since her repeat left sacroiliac joint injection on June 5, 2020 with Dr Ty Min, she is noting at least 50-60% overall improvement of her left-sided low back and SI joint pain and there are other times when she feels the relief is even more than that  She feels that her pain is very intermittent, minimal, and manageable  I have personally reviewed and/or updated the patient's past medical history, past surgical history, family history, social history, current medications, allergies, and vital signs today  Review of Systems:    Review of Systems   Respiratory: Negative for shortness of breath  Cardiovascular: Negative for chest pain  Gastrointestinal: Negative for constipation, diarrhea, nausea and vomiting  Musculoskeletal: Negative for arthralgias, gait problem, joint swelling and myalgias  Skin: Negative for rash  Neurological: Negative for dizziness, seizures and weakness  All other systems reviewed and are negative          Past Medical History:   Diagnosis Date    Asthma     [facility] )     Closed femur fracture ([facility] [address] [facility] )     and humerus resolved: 1/13/2017    [facility])     GERD (gastroesophageal reflux disease)     Influenza, pneumonia     resolved: 4/20/2017    Laryngeal spasm 2/22/2016    Leukocytosis 2/22/2016    Thyroid disorder     suppressed TSH    Traumatic arthropathy of left shoulder        Past Surgical History:   Procedure Laterality Date    BREAST BIOPSY      pt unsure of laterality or dates    BREAST CYST ASPIRATION      pt unsure of laterality or date    CHOLECYSTECTOMY      COLON SURGERY      ERCP  06/2014    HYSTERECTOMY      approx age 28   [facility]  r hip replacement, fx r shoulder    OOPHORECTOMY      pt has one ovary, not certain of laterality    ORIF HIP FRACTURE Right     VOLVULUS REDUCTION         Family History   Problem Relation Age of Onset    Pulmonary embolism Daughter     Crohn's disease Daughter     Pancreatic cancer Mother     Heart disease Father     Heart attack Family     No Known Problems Sister     Breast cancer Maternal Aunt 76    No Known Problems Sister    [facility] Substance Abuse Neg Hx         neg fam hx    Mental illness Neg Hx         neg fam hx       Social History     Occupational History    Not on file   Tobacco Use    Smoking status: Never Smoker    Smokeless tobacco: Never Used   Substance and Sexual Activity    Alcohol use: Yes     Frequency: Monthly or less     Drinks per session: 1 or 2     Binge frequency: Never     Comment: occasional    Drug use: No    Sexual activity: Not Currently         Current Outpatient Medications:     Calcium Carbonate-Vitamin D 600-200 MG-UNIT TABS, Take by mouth 1 daily, Disp: , Rfl:     loratadine (CLARITIN) 10 mg tablet, Take 10 mg by mouth daily  , Disp: , Rfl:     montelukast (SINGULAIR) 10 mg tablet, Take 1 tablet (10 mg total) by mouth daily at bedtime, Disp: 90 tablet, Rfl: 1    Multiple Vitamin (MULTI VITAMIN DAILY PO), Take 1 tablet by mouth daily, Disp: , Rfl:     omeprazole (PriLOSEC) 20 mg delayed release capsule, Take 1 capsule by mouth twice a day, Disp: 180 capsule, Rfl: 3    Allergies   Allergen Reactions    Codeine Tachycardia     Has tolerated promethazine with codeine cough syrup    Etodolac      Annotation - [id number]: RASH    Tetracyclines & Related      Annotation - [id number]: RASH    Erythromycin Rash       Physical Exam:    /82 (BP Location: Left arm, Patient Position: Sitting, Cuff Size: Standard)   Pulse 78   Temp 99 °F (37 2 °C)   Ht 5' 6" (1 676 m)   Wt 90 3 kg (199 lb)   LMP  (LMP Unknown)   BMI 32 12 kg/m²     Constitutional:normal, well developed, well nourished, alert, in no distress and non-toxic and no overt pain behavior   and overweight  Eyes:anicteric  HEENT:grossly intact  Neck:supple, symmetric, trachea midline and no masses   Pulmonary:even and unlabored  Cardiovascular:No edema or pitting edema present  Skin:Normal without rashes or lesions and well hydrated  Psychiatric:Mood and affect appropriate  Neurologic:Cranial Nerves II-XII grossly intact  Musculoskeletal:The patient's gait is slightly antalgic, but steady without the use of any assistive devices  Imaging  No orders to display         No orders of the defined types were placed in this encounter

## 2020-07-21 ENCOUNTER — OFFICE VISIT (OUTPATIENT)
Dept: OBGYN CLINIC | Facility: CLINIC | Age: 70
End: 2020-07-21
Payer: MEDICARE

## 2020-07-21 ENCOUNTER — APPOINTMENT (OUTPATIENT)
Dept: RADIOLOGY | Facility: CLINIC | Age: 70
End: 2020-07-21
Payer: MEDICARE

## 2020-07-21 VITALS
SYSTOLIC BLOOD PRESSURE: 122 MMHG | TEMPERATURE: 98.9 F | HEIGHT: 66 IN | WEIGHT: 200 LBS | DIASTOLIC BLOOD PRESSURE: 80 MMHG | BODY MASS INDEX: 32.14 KG/M2

## 2020-07-21 DIAGNOSIS — M70.51 PES ANSERINUS BURSITIS OF RIGHT KNEE: ICD-10-CM

## 2020-07-21 DIAGNOSIS — S82.191S OTHER CLOSED FRACTURE OF PROXIMAL END OF RIGHT TIBIA, SEQUELA: Primary | ICD-10-CM

## 2020-07-21 DIAGNOSIS — S82.191S OTHER CLOSED FRACTURE OF PROXIMAL END OF RIGHT TIBIA, SEQUELA: ICD-10-CM

## 2020-07-21 PROCEDURE — 1160F RVW MEDS BY RX/DR IN RCRD: CPT | Performed by: ORTHOPAEDIC SURGERY

## 2020-07-21 PROCEDURE — 4040F PNEUMOC VAC/ADMIN/RCVD: CPT | Performed by: ORTHOPAEDIC SURGERY

## 2020-07-21 PROCEDURE — 73562 X-RAY EXAM OF KNEE 3: CPT

## 2020-07-21 PROCEDURE — 20610 DRAIN/INJ JOINT/BURSA W/O US: CPT | Performed by: PHYSICIAN ASSISTANT

## 2020-07-21 PROCEDURE — 99024 POSTOP FOLLOW-UP VISIT: CPT | Performed by: ORTHOPAEDIC SURGERY

## 2020-07-21 PROCEDURE — 3008F BODY MASS INDEX DOCD: CPT | Performed by: ORTHOPAEDIC SURGERY

## 2020-07-21 PROCEDURE — 1170F FXNL STATUS ASSESSED: CPT | Performed by: ORTHOPAEDIC SURGERY

## 2020-07-21 PROCEDURE — 99213 OFFICE O/P EST LOW 20 MIN: CPT | Performed by: PHYSICIAN ASSISTANT

## 2020-07-21 RX ORDER — BETAMETHASONE SODIUM PHOSPHATE AND BETAMETHASONE ACETATE 3; 3 MG/ML; MG/ML
6 INJECTION, SUSPENSION INTRA-ARTICULAR; INTRALESIONAL; INTRAMUSCULAR; SOFT TISSUE
Status: COMPLETED | OUTPATIENT
Start: 2020-07-21 | End: 2020-07-21

## 2020-07-21 RX ORDER — LIDOCAINE HYDROCHLORIDE 10 MG/ML
7 INJECTION, SOLUTION INFILTRATION; PERINEURAL
Status: COMPLETED | OUTPATIENT
Start: 2020-07-21 | End: 2020-07-21

## 2020-07-21 RX ADMIN — LIDOCAINE HYDROCHLORIDE 7 ML: 10 INJECTION, SOLUTION INFILTRATION; PERINEURAL at 09:57

## 2020-07-21 RX ADMIN — BETAMETHASONE SODIUM PHOSPHATE AND BETAMETHASONE ACETATE 6 MG: 3; 3 INJECTION, SUSPENSION INTRA-ARTICULAR; INTRALESIONAL; INTRAMUSCULAR; SOFT TISSUE at 09:57

## 2020-07-21 NOTE — PROGRESS NOTES
Assessment:     1  Other closed fracture of proximal end of right tibia, sequela    2  Pes anserinus bursitis of right knee          Plan:     Problem List Items Addressed This Visit        Musculoskeletal and Integument    Closed fracture of right proximal tibia - Primary    Relevant Orders    XR knee 3 vw right non injury    Ambulatory referral to Physical Therapy    Pes anserinus bursitis of right knee    Relevant Medications    lidocaine (XYLOCAINE) 1 % injection 7 mL (Completed)    betamethasone acetate-betamethasone sodium phosphate (CELESTONE) injection 6 mg (Completed)    Other Relevant Orders    Ambulatory referral to Physical Therapy    Large joint arthrocentesis: R knee (Completed)          The patient was seen and examined by Dr Kareem Everett and myself  Findings consistent with healed right proximal tibia fracture with pes anserine bursitis  Findings and treatment options were discussed with the patient  X-rays were reviewed with her  Recommend cortisone injection to the right pes bursa today  She tolerated the procedure well  Advised to apply cold compress today  Recommend formal physical therapy at this time  She is to continue to use the hinged knee brace when out of the house for longer periods of time  Hamstring stretches were demonstrated for the patient the office today that she is to do on a daily basis  Discussed that the swelling extending to the ankle can happen with this condition and she is to continue icing and elevating  Follow-up in 6 weeks for re-evaluation  All questions were answered to patient's satisfaction  Patient ID: Josiah Velasco is a 72 y o  female  Chief Complaint:  Right knee pain     Subjective:  29-year-old female here today follow-up for a right proximal  tibial plateau fracture approximately 10 weeks ago  She has been weight-bearing as tolerated to the right lower extremity with use of short hinged knee brace    She is not wearing the short knee hinged brace today since she states his difficult to wear for longer periods of time due to pressure and increased pain over the anteromedial aspect of knee  That pain started when she started weight-bearing  She is also complaining of swelling extending down to the ankle that is worse at the end of the day  Elevation improves it  Allergy:  Allergies   Allergen Reactions    Codeine Tachycardia     Has tolerated promethazine with codeine cough syrup    Etodolac      Annotation - [id number]: RASH    Tetracyclines & Related      Annotation - [id number]: RASH    Erythromycin Rash       Medications:  all current active meds have been reviewed and current meds:       ROS:  Review of Systems   Constitutional: Negative  Negative for chills, fever and unexpected weight change  HENT: Negative  Negative for hearing loss, nosebleeds and postnasal drip  Eyes: Negative  Negative for pain, redness and visual disturbance  Respiratory: Negative  Negative for cough, shortness of breath and wheezing  Cardiovascular: Negative  Negative for chest pain, palpitations and leg swelling  Gastrointestinal: Negative  Negative for abdominal pain, nausea and vomiting  Endocrine: Negative for polydipsia and polyuria  Genitourinary: Negative for dysuria  Musculoskeletal: Positive for gait problem (antalgic)  Negative for arthralgias and joint swelling  Skin: Negative for rash and wound  Neurological: Negative for dizziness, numbness and headaches  Hematological: Negative  Psychiatric/Behavioral: Negative for decreased concentration and suicidal ideas  The patient is not nervous/anxious  Objective:  BP Readings from Last 1 Encounters:   07/21/20 122/80      Wt Readings from Last 1 Encounters:   07/21/20 90 7 kg (200 lb)        Exam:   Physical Exam   Constitutional: She is oriented to person, place, and time  She appears well-developed and well-nourished  HENT:   Head: Normocephalic and atraumatic     Right Ear: External ear normal    Left Ear: External ear normal    Eyes: Conjunctivae and EOM are normal    Neck: Neck supple  Pulmonary/Chest: Effort normal    Musculoskeletal:        Right knee: She exhibits no effusion  Neurological: She is alert and oriented to person, place, and time  Skin: Skin is warm and dry  Psychiatric: She has a normal mood and affect  Her behavior is normal  Thought content normal      Right Knee Exam     Tenderness   The patient is experiencing tenderness in the pes anserinus  Range of Motion   The patient has normal right knee ROM  Tests   Varus: negative Valgus: negative  Patellar apprehension: negative    Other   Erythema: absent  Scars: absent  Sensation: normal  Pulse: present  Swelling: mild  Effusion: no effusion present    Comments:  Swelling extending down to ankle  Calf soft, nontender            Radiographs:  I have personally reviewed pertinent films in PACS and my interpretation is X-rays of the right knee reveal healed medial and lateral tibial plateau fractures extending into the joint       Large joint arthrocentesis: R knee  Date/Time: 7/21/2020 9:57 AM  Consent given by: patient  Site marked: site marked  Timeout: Immediately prior to procedure a time out was called to verify the correct patient, procedure, equipment, support staff and site/side marked as required   Supporting Documentation  Indications: pain   Procedure Details  Location: knee - R knee  Preparation: Patient was prepped and draped in the usual sterile fashion  Needle size: 22 G  Ultrasound guidance: no  Approach: anteromedial  Medications administered: 7 mL lidocaine 1 %; 6 mg betamethasone acetate-betamethasone sodium phosphate 6 (3-3) mg/mL    Patient tolerance: patient tolerated the procedure well with no immediate complications  Dressing:  Sterile dressing applied

## 2020-07-22 ENCOUNTER — EVALUATION (OUTPATIENT)
Dept: PHYSICAL THERAPY | Facility: CLINIC | Age: 70
End: 2020-07-22
Payer: MEDICARE

## 2020-07-22 DIAGNOSIS — M70.51 PES ANSERINUS BURSITIS OF RIGHT KNEE: Primary | ICD-10-CM

## 2020-07-22 DIAGNOSIS — S82.191S OTHER CLOSED FRACTURE OF PROXIMAL END OF RIGHT TIBIA, SEQUELA: ICD-10-CM

## 2020-07-22 PROCEDURE — 97161 PT EVAL LOW COMPLEX 20 MIN: CPT | Performed by: PHYSICAL THERAPIST
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
## 2020-07-23 DIAGNOSIS — J45.21 MILD INTERMITTENT ASTHMA WITH ACUTE EXACERBATION: ICD-10-CM

## 2020-07-23 RX ORDER — MONTELUKAST SODIUM 10 MG/1
TABLET ORAL
Qty: 90 TABLET | Refills: 0 | Status: SHIPPED | OUTPATIENT
Start: 2020-07-23 | End: 2020-10-08 | Stop reason: SDUPTHER

## 2020-07-28 ENCOUNTER — OFFICE VISIT (OUTPATIENT)
Dept: PHYSICAL THERAPY | Facility: CLINIC | Age: 70
End: 2020-07-28
Payer: MEDICARE

## 2020-07-28 DIAGNOSIS — M70.51 PES ANSERINUS BURSITIS OF RIGHT KNEE: ICD-10-CM

## 2020-07-28 DIAGNOSIS — S82.191S OTHER CLOSED FRACTURE OF PROXIMAL END OF RIGHT TIBIA, SEQUELA: Primary | ICD-10-CM

## 2020-07-28 PROCEDURE — 97110 THERAPEUTIC EXERCISES: CPT | Performed by: PHYSICAL THERAPIST

## 2020-07-28 PROCEDURE — 97112 NEUROMUSCULAR REEDUCATION: CPT | Performed by: PHYSICAL THERAPIST

## 2020-07-28 NOTE — PROGRESS NOTES
Daily Note     Today's date: 2020  Patient name: Suresh Weaver  : 1950  MRN: [id number]  Referring provider: Ernestine Garcia PA-C  Dx:   Encounter Diagnosis     ICD-10-CM    1  Other closed fracture of proximal end of right tibia, sequela S82 191S    2  Pes anserinus bursitis of right knee M70 51        Start Time: 1139          Subjective: Minimal pain today  Compliant with HEP; standing VMO SLR is painful, may need to modify  Pain has overall been improving since initial evaluation  Objective: See treatment diary below      Assessment: Tolerated treatment well  Tendency for valgus with mini squat, cues given for neutral knees and sticking her hips backward as if she is sitting in a chair for better glute activation  Supine VMO SLR more tolerable than standing  Difficulty with foam walking  Patient demonstrated fatigue post treatment and would benefit from continued PT      Plan: Continue per plan of care        Precautions: Asthma, R THR 2008, h/o L SIJ pain                  Manuals                                                                 Neuro Re-Ed             SLS  30"x2 ea           Foam walking, tandem fwd/bkwd, sidestep  12ft x3 laps ea                                                                            Ther Ex             bike  7'           Standing groin stretch  30"x2 ea           Hip flexor stretch standing HEP standing 30"x2 ea           3-direction hip standing HEP 2 5# 2x10 ea           VMO SLR standing HEP Supine 2x10           Mini squat HEP 2x10           LAQ  2 5# 5" at top 2x10 ea                                                               Ther Activity                                       Gait Training                                       Modalities

## 2020-07-30 ENCOUNTER — APPOINTMENT (OUTPATIENT)
Dept: PHYSICAL THERAPY | Facility: CLINIC | Age: 70
End: 2020-07-30
Payer: MEDICARE

## 2020-08-04 ENCOUNTER — OFFICE VISIT (OUTPATIENT)
Dept: PHYSICAL THERAPY | Facility: CLINIC | Age: 70
End: 2020-08-04
Payer: MEDICARE

## 2020-08-04 DIAGNOSIS — M70.51 PES ANSERINUS BURSITIS OF RIGHT KNEE: ICD-10-CM

## 2020-08-04 DIAGNOSIS — S82.191S OTHER CLOSED FRACTURE OF PROXIMAL END OF RIGHT TIBIA, SEQUELA: Primary | ICD-10-CM

## 2020-08-04 PROCEDURE — 97112 NEUROMUSCULAR REEDUCATION: CPT | Performed by: PHYSICAL THERAPIST

## 2020-08-04 PROCEDURE — 97110 THERAPEUTIC EXERCISES: CPT | Performed by: PHYSICAL THERAPIST

## 2020-08-04 NOTE — PROGRESS NOTES
Daily Note     Today's date: 2020  Patient name: Ha Everett  : 1950  MRN: [id number]  Referring provider: Nadia Lewis PA-C  Dx:   Encounter Diagnosis     ICD-10-CM    1  Other closed fracture of proximal end of right tibia, sequela  S82 191S    2  Pes anserinus bursitis of right knee  M70 51        Start Time: 1145          Subjective: Knee is doing fine and feeling normal  She feels as though her HEP is aggravating L low back pain  Objective: See treatment diary below      Assessment: Tolerated treatment well  Performed therex NWB today secondary to high irritability of lumbar pain, good response  Prone hip extension painful for back  Good tolerance to all other therex  More difficulty with fwd foam walking compared to bwkd and lateral, requiring more frequent unilateral railing assist to prevent LOB  Patient demonstrated fatigue post treatment and would benefit from continued PT      Plan: Continue per plan of care        Precautions: Asthma, R THR , h/o L SIJ pain        8/          Manuals                                                                 Neuro Re-Ed             SLS  30"x2 ea 30"x2 ea          Foam walking, tandem fwd/bkwd, sidestep  12ft x3 laps ea x3 laps ea                                                                           Ther Ex             bike  7' 8'          Standing groin stretch  30"x2 ea           Hip flexor stretch standing HEP standing 30"x2 ea           3-direction hip standing HEP 2 5# 2x10 ea flex, ABD only On table 5" x10 ea          VMO SLR standing HEP Supine 2x10 supine 5"x10 ea          Mini squat HEP 2x10 mirror 2x10          LAQ  2 5# 5" at top 2x10 ea 3# 5" 2x10 EA          tball lumbar 3-way stretch   10"x5 ea          bridge   5"x10                                                                                                     Ther Activity                                       Gait Training Modalities

## 2020-08-06 ENCOUNTER — APPOINTMENT (OUTPATIENT)
Dept: PHYSICAL THERAPY | Facility: CLINIC | Age: 70
End: 2020-08-06
Payer: MEDICARE

## 2020-08-10 NOTE — TELEPHONE ENCOUNTER
S/w pt, stated that she has 95% improvement in her pain at this point and would prefer to revisit the idea of PT if her pain comes back  Advised pt to cb if her pain returns / questions arise  Pt verbalized understandign and appreciation  [facility]

                             Created on: 10/09/2018



Judy Scott

External Reference #: [id number]

: 1975

Sex: Female



Demographics





                          Address                   [address] [place]  [address]

 

                          Preferred Language        Unknown

 

                          Marital Status            Unknown

 

                          Faith Affiliation     Unknown

 

                          Race                      Unknown

 

                          Ethnic Group              Unknown





Author





                          Author                    Judy NGUYEN

 

                          [facility]

 

                          Address                   [address] [place]  [address]



 

                          Phone                     [phone]







Care Team Providers





                    Care Team Member Name Role                Phone

 

                    ELI NGUYEN       Unavailable         [phone]







PROBLEMS





          Type      Condition ICD9-CM Code KBH20-KZ Code Onset Dates Condition S

tatus SNOMED 

Code

 

          Problem   HGSIL on cytologic smear of cervix           R87.613        

     Active    638471071

 

          Problem   HPV (human papilloma virus) infection           A63.0       

        Active    741134955

 

          Problem   Carcinoma in situ of endocervix           D06.0             

  Active    00313959







ALLERGIES

No Information



ENCOUNTERS





                Encounter       Location        Date            Diagnosis

 

                          [facility]   [address] [id number]

[address] [place] [phone]

                          25 Dec, 2018               

 

                          [facility]     [address] [facility] [id number]

[address] [place] [phone]

                          25 Sep, 2018               

 

                          [facility]   [address] [facility] [id number]

[address] [place] [phone]

                          13 Sep, 2018              Dental examination Z01.20

 

                          [facility]     [address] [facility] [id number]

[address] [place] [phone]

                                        Well woman exam Z01.419 and 

Screening breast examination Z12.31

 

                          [facility]     [address] [facility] [id number]

[address] [place] [phone]

                                         

 

                          [facility]     [address] [facility] [id number]

[address] [place] [phone]

                          09 May, 2017               

 

                          [facility]     [address] [facility] [id number]

[address] [place] [phone]

                          03 May, 2017               

 

                          [facility]     [address] [facility] [id number]

[address] [place] [phone]

                                        HGSIL on cytologic smear of 

cervix R87.613

 

                          [place] [address] [address] [facility] [id number]

[address] [place] [phone]

                                        HGSIL on cytologic smear of 

cervix R87.613 and HPV (human papilloma

virus) infection A63.0

 

                          [place] [address] [address] [place] [id number]

[address] [place] [phone]

                          27 Dec, 2016              Cervical cancer screening Z1

2.4

 

                          [place] [address] [id number]

[address] [place] [phone]

                                        Well woman exam Z01.419 and 

Palpitations R00.2

 

                          [place] [address] [address]

[address] [place] [phone]

                          30 Mar, 2016              Dental examination Z01.20







IMMUNIZATIONS

No Known Immunizations



SOCIAL HISTORY

Never Assessed



REASON FOR VISIT

[facility] Intake 



PLAN OF CARE





VITAL SIGNS





MEDICATIONS

Unknown Medications



RESULTS

No Results



PROCEDURES

No Known procedures



INSTRUCTIONS





MEDICATIONS ADMINISTERED

No Known Medications



MEDICAL (GENERAL) HISTORY





                    Type                Description         Date

 

                    Surgical History     x 2        

 

                    Surgical History    tubal ligation       

 

                    Surgical History    hysterectomy

## 2020-08-11 ENCOUNTER — OFFICE VISIT (OUTPATIENT)
Dept: PHYSICAL THERAPY | Facility: CLINIC | Age: 70
End: 2020-08-11
Payer: MEDICARE

## 2020-08-11 DIAGNOSIS — M70.51 PES ANSERINUS BURSITIS OF RIGHT KNEE: ICD-10-CM

## 2020-08-11 DIAGNOSIS — S82.191S OTHER CLOSED FRACTURE OF PROXIMAL END OF RIGHT TIBIA, SEQUELA: Primary | ICD-10-CM

## 2020-08-11 PROCEDURE — 97112 NEUROMUSCULAR REEDUCATION: CPT | Performed by: PHYSICAL THERAPIST

## 2020-08-11 PROCEDURE — 97110 THERAPEUTIC EXERCISES: CPT | Performed by: PHYSICAL THERAPIST

## 2020-08-11 NOTE — PROGRESS NOTES
Daily Note     Today's date: 2020  Patient name: Suresh Weaver  : 1950  MRN: [id number]  Referring provider: Ernestine Garcia PA-C  Dx:   Encounter Diagnosis     ICD-10-CM    1  Other closed fracture of proximal end of right tibia, sequela  S82 191S    2  Pes anserinus bursitis of right knee  M70 51        Start Time: 1100          Subjective: Knee pain feels completely resolved, but her L low back pain is increasingly worse; she has been doing the back exercises given to her from pain mgmt without relief  Unable to perform the standing HEP exercises  Objective: See treatment diary below      Assessment: Tolerated treatment well  Good tolerance to hip strengthening and balance therex  Continues to have difficulty with static single leg balance  No c/o pain in R knee throughout session, only pulling in L low back region  Added core stability therex and lumbar stretching/ROM for pain relief  Patient demonstrated fatigue post treatment and would benefit from continued PT      Plan: Continue per plan of care        Precautions: Asthma, R THR , h/o L SIJ pain                Manuals                                                                 Neuro Re-Ed             SLS  30"x2 ea 30"x2 ea 30"x2 ea         Foam walking, tandem fwd/bkwd, sidestep  12ft x3 laps ea x3 laps ea x3 laps ea         TA bracing    10"x10                                                             Ther Ex             bike  7' 8' 7'         Standing groin stretch  30"x2 ea           Hip flexor stretch standing HEP standing 30"x2 ea           3-direction hip standing HEP 2 5# 2x10 ea           Hip abd   sidelying 5"x10 ea sidelying 5" 2x10 ea         SLR   supine 5" x10 ea With TA brace 5"x10 ea         VMO SLR standing HEP Supine 2x10 supine 5"x10 ea With TA brace 5"x10 ea D/c        Mini squat HEP 2x10 mirror 2x10          LAQ  2 5# 5" at top 2x10 ea 3# 5" 2x10 EA          tball lumbar 3-way stretch   10"x5 ea x10 ea         bridge   5"x10 With TA brace 5" x10         HL lumbar rot    x10 ea                                                                                       Ther Activity                                       Gait Training                                       Modalities

## 2020-08-13 ENCOUNTER — APPOINTMENT (OUTPATIENT)
Dept: PHYSICAL THERAPY | Facility: CLINIC | Age: 70
End: 2020-08-13
Payer: MEDICARE

## 2020-08-18 ENCOUNTER — APPOINTMENT (OUTPATIENT)
Dept: PHYSICAL THERAPY | Facility: CLINIC | Age: 70
End: 2020-08-18
Payer: MEDICARE

## 2020-08-20 ENCOUNTER — APPOINTMENT (OUTPATIENT)
Dept: PHYSICAL THERAPY | Facility: CLINIC | Age: 70
End: 2020-08-20
Payer: MEDICARE

## 2020-08-25 ENCOUNTER — APPOINTMENT (OUTPATIENT)
Dept: PHYSICAL THERAPY | Facility: CLINIC | Age: 70
End: 2020-08-25
Payer: MEDICARE

## 2020-08-26 ENCOUNTER — APPOINTMENT (OUTPATIENT)
Dept: RADIOLOGY | Facility: CLINIC | Age: 70
End: 2020-08-26
Payer: MEDICARE

## 2020-08-26 ENCOUNTER — OFFICE VISIT (OUTPATIENT)
Dept: OBGYN CLINIC | Facility: CLINIC | Age: 70
End: 2020-08-26
Payer: MEDICARE

## 2020-08-26 VITALS
BODY MASS INDEX: 31.82 KG/M2 | SYSTOLIC BLOOD PRESSURE: 118 MMHG | TEMPERATURE: 99.6 F | DIASTOLIC BLOOD PRESSURE: 72 MMHG | HEIGHT: 66 IN | WEIGHT: 198 LBS

## 2020-08-26 DIAGNOSIS — M76.61 ACHILLES TENDINITIS OF RIGHT LOWER EXTREMITY: Primary | ICD-10-CM

## 2020-08-26 DIAGNOSIS — M25.571 RIGHT ANKLE PAIN, UNSPECIFIED CHRONICITY: ICD-10-CM

## 2020-08-26 PROCEDURE — 3008F BODY MASS INDEX DOCD: CPT | Performed by: ORTHOPAEDIC SURGERY

## 2020-08-26 PROCEDURE — 4040F PNEUMOC VAC/ADMIN/RCVD: CPT | Performed by: ORTHOPAEDIC SURGERY

## 2020-08-26 PROCEDURE — 99213 OFFICE O/P EST LOW 20 MIN: CPT | Performed by: ORTHOPAEDIC SURGERY

## 2020-08-26 PROCEDURE — 1160F RVW MEDS BY RX/DR IN RCRD: CPT | Performed by: ORTHOPAEDIC SURGERY

## 2020-08-26 PROCEDURE — 73610 X-RAY EXAM OF ANKLE: CPT

## 2020-08-26 NOTE — ASSESSMENT & PLAN NOTE
· Findings consistent with right Achilles tendinitis  Discussed findings and treatment options with the patient  Reviewed patient's right ankle x-ray with her  I discussed prognosis of her ankle condition  · Insert patient to obtain gel heel cup local pharmacy for the above-mentioned diagnosis  She also begin formal PT/HEP  I also provided patient a prescription for obtaining CAM walker if her symptoms does not improve with heel cups  · Take OTC anti-inflammatories or apply topical agents to the heel p r n  for pain relief  · Follow-up in 6 weeks for re-evaluation symptoms  · All patient's questions were answered to her satisfaction  This note is created using dictation transcription  It may contain typographical errors, grammatical errors, improperly dictated words, background noise and other errors

## 2020-08-26 NOTE — PROGRESS NOTES
Assessment:     1  Achilles tendinitis of right lower extremity        Plan:      Problem List Items Addressed This Visit        Musculoskeletal and Integument    Achilles tendinitis of right lower extremity - Primary     · Findings consistent with right Achilles tendinitis  Discussed findings and treatment options with the patient  Reviewed patient's right ankle x-ray with her  I discussed prognosis of her ankle condition  · Insert patient to obtain gel heel cup local pharmacy for the above-mentioned diagnosis  She also begin formal PT/HEP  I also provided patient a prescription for obtaining CAM walker if her symptoms does not improve with heel cups  · Take OTC anti-inflammatories or apply topical agents to the heel p r n  for pain relief  · Follow-up in 6 weeks for re-evaluation symptoms  · All patient's questions were answered to her satisfaction  This note is created using dictation transcription  It may contain typographical errors, grammatical errors, improperly dictated words, background noise and other errors  Relevant Orders    XR ankle 3+ vw right    Ambulatory referral to Physical Therapy    Cam Boot         Subjective:     Patient ID: Chelly Bear is a 72 y o  female  Chief Complaint:  30-year-old female presents to the office today for a follow-up visit with a new injury to her right ankle and heel  Patient states that she does not remember any certain mechanism injury that caused her increase in pain  She has recently treated for a proximal tibia fracture and states that ever since she has begun weight-bearing the pain in her ankle and heel increased  She denies any knee pain today on examination  She is localizing her symptoms to the posterior lateral aspect of her ankle and heel  She states that her symptoms are worse with ambulating and standing  Denies numbness and tingling, fever, chills  Information on patient's intake form was reviewed           Allergy:  Allergies Allergen Reactions    Codeine Tachycardia     Has tolerated promethazine with codeine cough syrup    Etodolac      Annotation - [id number]: RASH    Tetracyclines & Related      Annotation - [id number]: RASH    Erythromycin Rash     Medications:  all current active meds have been reviewed  Past Medical History:  Past Medical History:   Diagnosis Date    Asthma     Bronchiectasis ([facility] [address] [facility]     Closed femur fracture ([facility] [address] )     and humerus resolved: 1/13/2017    COPD (chronic obstructive pulmonary disease) (HCC)     GERD (gastroesophageal reflux disease)     Influenza, pneumonia     resolved: 4/20/2017    Laryngeal spasm 2/22/2016    Leukocytosis 2/22/2016    Thyroid disorder     suppressed TSH    Traumatic arthropathy of left shoulder      Past Surgical History:  Past Surgical History:   Procedure Laterality Date    BREAST BIOPSY      pt unsure of laterality or dates    BREAST CYST ASPIRATION      pt unsure of laterality or date    CHOLECYSTECTOMY      COLON SURGERY      ERCP  06/2014    HYSTERECTOMY      approx age 28   [address]  r hip replacement, fx r shoulder    OOPHORECTOMY      pt has one ovary, not certain of laterality    ORIF HIP FRACTURE Right     VOLVULUS REDUCTION       Family History:  Family History   Problem Relation Age of Onset    Pulmonary embolism Daughter     Crohn's disease Daughter     Pancreatic cancer Mother     Heart disease Father     Heart attack Family     No Known Problems Sister     Breast cancer Maternal Aunt 76    No Known Problems Sister     Substance Abuse Neg Hx         neg fam hx    Mental illness Neg Hx         neg fam hx     Social History:  Social History     Substance and Sexual Activity   Alcohol Use Yes    Frequency: Monthly or less    Drinks per session: 1 or 2    Binge frequency: Never    Comment: occasional     Social History     Substance and Sexual Activity   Drug Use No     Social History     Tobacco Use   Smoking Status Never Smoker   Smokeless Tobacco Never Used     Review of Systems   Constitutional: Negative for chills, fever and unexpected weight change  HENT: Negative for hearing loss, nosebleeds and sore throat  Eyes: Negative for pain, redness and visual disturbance  Respiratory: Negative for cough, shortness of breath and wheezing  Cardiovascular: Negative for chest pain, palpitations and leg swelling  Gastrointestinal: Negative for abdominal distention, nausea and vomiting  Endocrine: Negative for polydipsia and polyuria  Genitourinary: Negative for dysuria and hematuria  Musculoskeletal: Positive for arthralgias (Right heel) and gait problem (Antalgic)  Skin: Negative for rash and wound  Neurological: Negative for dizziness, numbness and headaches  Psychiatric/Behavioral: Negative for decreased concentration and suicidal ideas  Objective:  BP Readings from Last 1 Encounters:   08/26/20 118/72      Wt Readings from Last 1 Encounters:   08/26/20 89 8 kg (198 lb)      BMI:   Estimated body mass index is 31 96 kg/m² as calculated from the following:    Height as of this encounter: 5' 6" (1 676 m)  Weight as of this encounter: 89 8 kg (198 lb)  BSA:   Estimated body surface area is 1 99 meters squared as calculated from the following:    Height as of this encounter: 5' 6" (1 676 m)  Weight as of this encounter: 89 8 kg (198 lb)  Physical Exam  Vitals signs and nursing note reviewed  Constitutional:       Appearance: Normal appearance  She is well-developed  HENT:      Head: Normocephalic and atraumatic  Right Ear: External ear normal       Left Ear: External ear normal    Eyes:      Extraocular Movements: Extraocular movements intact  Conjunctiva/sclera: Conjunctivae normal    Neck:      Musculoskeletal: Neck supple  Pulmonary:      Effort: Pulmonary effort is normal    Musculoskeletal:      Right knee: She exhibits no effusion  Skin:     General: Skin is warm and dry  Neurological:      Mental Status: She is alert and oriented to person, place, and time  Deep Tendon Reflexes: Reflexes are normal and symmetric  Psychiatric:         Behavior: Behavior normal          Thought Content: Thought content normal          Judgment: Judgment normal        Right Ankle Exam     Tenderness   Right ankle tenderness location: Achilles insertion  Swelling: mild (Heel)    Range of Motion   The patient has normal right ankle ROM  Muscle Strength   Dorsiflexion:  5/5  Plantar flexion:  5/5    Other   Erythema: absent  Sensation: normal  Pulse: present       Right Knee Exam     Other   Effusion: no effusion present            I have personally reviewed pertinent films in PACS and my interpretation is X Ray Right Ankle:[place] Deformity noted with calcifications about the achilles tendon      Scribe Attestation    I,:   Rolf Anthony am acting as a scribe while in the presence of the attending physician :        I,:   Mario Jenkins MD personally performed the services described in this documentation    as scribed in my presence :

## 2020-08-27 ENCOUNTER — APPOINTMENT (OUTPATIENT)
Dept: PHYSICAL THERAPY | Facility: CLINIC | Age: 70
End: 2020-08-27
Payer: MEDICARE

## 2020-08-31 ENCOUNTER — EVALUATION (OUTPATIENT)
Dept: PHYSICAL THERAPY | Facility: CLINIC | Age: 70
End: 2020-08-31
Payer: MEDICARE

## 2020-08-31 DIAGNOSIS — S82.191S OTHER CLOSED FRACTURE OF PROXIMAL END OF RIGHT TIBIA, SEQUELA: ICD-10-CM

## 2020-08-31 DIAGNOSIS — M76.61 ACHILLES TENDINITIS, RIGHT LEG: Primary | ICD-10-CM

## 2020-08-31 PROCEDURE — 97164 PT RE-EVAL EST PLAN CARE: CPT | Performed by: PHYSICAL THERAPIST

## 2020-08-31 PROCEDURE — 97140 MANUAL THERAPY 1/> REGIONS: CPT | Performed by: PHYSICAL THERAPIST
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
## 2020-09-01 ENCOUNTER — APPOINTMENT (OUTPATIENT)
Dept: PHYSICAL THERAPY | Facility: CLINIC | Age: 70
End: 2020-09-01
Payer: MEDICARE

## 2020-09-02 ENCOUNTER — OFFICE VISIT (OUTPATIENT)
Dept: PHYSICAL THERAPY | Facility: CLINIC | Age: 70
End: 2020-09-02
Payer: MEDICARE

## 2020-09-02 DIAGNOSIS — M76.61 ACHILLES TENDINITIS, RIGHT LEG: Primary | ICD-10-CM

## 2020-09-02 PROCEDURE — 97140 MANUAL THERAPY 1/> REGIONS: CPT | Performed by: PHYSICAL THERAPIST

## 2020-09-02 PROCEDURE — 97110 THERAPEUTIC EXERCISES: CPT | Performed by: PHYSICAL THERAPIST

## 2020-09-02 PROCEDURE — 97112 NEUROMUSCULAR REEDUCATION: CPT | Performed by: PHYSICAL THERAPIST

## 2020-09-02 NOTE — PROGRESS NOTES
Daily Note     Today's date: 2020  Patient name: Lisa Jolly  : 1950  MRN: [id number]  Referring provider: Funmi Lee PA-C  Dx:   Encounter Diagnosis     ICD-10-CM    1  Achilles tendinitis, right leg  M76 61        Start Time: 825          Subjective: In pain today  The standing heel raises are more painful than seated heel raise in the HEP  Objective: See treatment diary below      Assessment: Tolerated treatment well  C/o low back hurting with bike warm up, but heel is painful in WB to perform warm up on TM or elliptical  Challenged by balance activities  Good tolerance to TE  Added 4-way tband ankle eccentrics to HEP  Assess response to manual therapy nv  Progress ankle ROM, strength, stability and balance as tolerable nv  Patient demonstrated fatigue post treatment and would benefit from continued PT      Plan: Continue per plan of care        Precautions: Asthma, R THR , R proximal tibial fx  8/           Manuals                        R ankle PROM                        R achilles STM/IASTM            10'            RE          15                                     Neuro Re-Ed                       SLS   30"x2 ea 30"x2 ea 30"x2 ea    30"x2 ea           Foam walking, tandem fwd/bkwd, sidestep   12ft x3 laps ea x3 laps ea x3 laps ea    x3 laps ea           TA bracing       10"x10                                                                                                               Ther Ex                       bike   7' 6' 7'    7'           Standing groin stretch   30"x2 ea                   Hip flexor stretch standing HEP standing 30"x2 ea                   3-direction hip standing HEP 2 5# 2x10 ea                   Hip abd     sidelying 5"x10 ea sidelying 5" 2x10 ea               SLR     supine 5" x10 ea With TA brace 5"x10 ea               VMO SLR standing HEP Supine 2x10 supine 5"x10 ea With TA brace 5"x10 ea D/c             Mini squat HEP 2x10 mirror 2x10                 LAQ   2 5# 5" at top 2x10 ea 3# 5" 2x10 EA                 tball lumbar 3-way stretch     10"x5 ea x10 ea               bridge     5"x10 With TA brace 5" x10               HL lumbar rot       x10 ea                            Ankle CW, CCW     HEP x30 ea       Heel raises     seated HEP Seated x30       Toe raise     seated HEP Seated x30       Calf stretch         HEP  wedge 30"x3            toe towel scrunch         HEP              rocker board DF/PF, lat           x20 ea            4-way tband ankle            otb 4" ecc x15 ea (HEP)                                                                                                               Ther Activity                                                                       Gait Training                                                                       Modalities

## 2020-09-03 ENCOUNTER — APPOINTMENT (OUTPATIENT)
Dept: PHYSICAL THERAPY | Facility: CLINIC | Age: 70
End: 2020-09-03
Payer: MEDICARE

## 2020-09-08 ENCOUNTER — OFFICE VISIT (OUTPATIENT)
Dept: PHYSICAL THERAPY | Facility: CLINIC | Age: 70
End: 2020-09-08
Payer: MEDICARE

## 2020-09-08 DIAGNOSIS — M76.61 ACHILLES TENDINITIS, RIGHT LEG: Primary | ICD-10-CM

## 2020-09-08 PROCEDURE — 97110 THERAPEUTIC EXERCISES: CPT | Performed by: PHYSICAL THERAPIST

## 2020-09-08 PROCEDURE — 97140 MANUAL THERAPY 1/> REGIONS: CPT | Performed by: PHYSICAL THERAPIST

## 2020-09-08 NOTE — PROGRESS NOTES
Daily Note     Today's date: 2020  Patient name: Ha Everett  : 1950  MRN: [id number]  Referring provider: Nadia Lewis PA-C  Dx:   Encounter Diagnosis     ICD-10-CM    1  Achilles tendinitis, right leg  M76 61        Start Time: 1145          Subjective: Doing better since last visit  IASTM with good tolerance, felt good the rest of the day  Bad days are becoming less frequent and with less intense pain  Objective: See treatment diary below      Assessment: Tolerated treatment well  Good tolerance to TE and manual therapy today  Progress ankle ROM, strength, stability and balance as tolerable nv  Patient demonstrated fatigue post treatment and would benefit from continued PT      Plan: Continue per plan of care        Precautions: Asthma, R THR , R proximal tibial fx          Manuals                        R ankle PROM                        R achilles STM/IASTM            10'  8'          RE          15                                     Neuro Re-Ed                       SLS   30"x2 ea 30"x2 ea 30"x2 ea    30"x2 ea  30"x2 ea         Foam walking, tandem fwd/bkwd, sidestep   12ft x3 laps ea x3 laps ea x3 laps ea    x3 laps ea  x3 laps ea         TA bracing       10"x10                                                                                                               Ther Ex                       bike   9' 6' 7'    7'  7'         Standing groin stretch   30"x2 ea                   Hip flexor stretch standing HEP standing 30"x2 ea                   3-direction hip standing HEP 2 5# 2x10 ea                   Hip abd     sidelying 5"x10 ea sidelying 5" 2x10 ea               SLR     supine 5" x10 ea With TA brace 5"x10 ea               VMO SLR standing HEP Supine 2x10 supine 5"x10 ea With TA brace 5"x10 ea D/c             Mini squat HEP 2x10 mirror 2x10                 LAQ   2 5# 5" at top 2x10 ea 3# 5" 2x10 EA                 tball lumbar 3-way stretch     10"x5 ea x10 ea               bridge     5"x10 With TA brace 5" x10               HL lumbar rot       x10 ea                            Ankle CW, CCW     HEP x30 ea x30 ea      Heel raises     seated HEP Seated x30 Seated x30      Toe raise     seated HEP Seated x30 Seated x30      Calf stretch         HEP  wedge 30"x3  wedge 30"x3          toe towel scrunch         HEP              rocker board DF/PF, lat           x20 ea  x20 ea          4-way tband ankle            otb 4" ecc x15 ea (HEP)  otb 4" ecc x15 ea                                                                                                             Ther Activity                                                                       Gait Training                                                                       Modalities

## 2020-09-10 ENCOUNTER — OFFICE VISIT (OUTPATIENT)
Dept: PHYSICAL THERAPY | Facility: CLINIC | Age: 70
End: 2020-09-10
Payer: MEDICARE

## 2020-09-10 DIAGNOSIS — M76.61 ACHILLES TENDINITIS, RIGHT LEG: Primary | ICD-10-CM

## 2020-09-10 PROCEDURE — 97140 MANUAL THERAPY 1/> REGIONS: CPT | Performed by: PHYSICAL THERAPIST

## 2020-09-10 PROCEDURE — 97110 THERAPEUTIC EXERCISES: CPT | Performed by: PHYSICAL THERAPIST

## 2020-09-10 NOTE — PROGRESS NOTES
Daily Note - d/c summary    Today's date: 9/10/2020  Patient name: Reece Burton  : 1950  MRN: [id number]  Referring provider: Katy Lima PA-C  Dx:   Encounter Diagnosis     ICD-10-CM    1  Achilles tendinitis, right leg  M76 61        Start Time: 1215: Pt has been on vacation and has not attended PT for +2 weeks  D/c to HEP at this time  Subjective: Sore today  Leaving for [place] for the next 3 weeks on Saturday, planning to quarantine for 2 weeks following return from vacation  Pt does not have therapy session scheduled for the future, she is planning to call doctor if symptoms don't resolve with HEP over the next few weeks and will need to be seen for more pt  She is considering filling her prescription for the CAM boot to [facility] to [facility] with her to use in the event that her symptoms worsen with increased walking/activities  Objective: See treatment diary below      Assessment: Tolerated treatment well  Progressed heel raise to eccentric with weight added, pt still unable to tolerate heel raise in standing  Reviewed HEP with pt for her vacation  Good tolerance to TE and manual therapy today  Patient demonstrated fatigue post treatment and would benefit from continued PT      Plan: Continue per plan of care        Precautions: Asthma, R THR , R proximal tibial fx 2020            7/22 7/28 8/4 8/11  8/31  9/2  9/8  9/10       Manuals                        R ankle PROM                        R achilles STM/IASTM            10'  8'  8'        RE          15                                     Neuro Re-Ed                       SLS   30"x2 ea 30"x2 ea 30"x2 ea    30"x2 ea  30"x2 ea  30"x2 ea       Foam walking, tandem fwd/bkwd, sidestep   12ft x3 laps ea x3 laps ea x3 laps ea    x3 laps ea  x3 laps ea  x3 laps ea       TA bracing       10"x10                                                                                                               Ther Ex                     bike   7' 8' 7'    7'  7'  7'       Standing groin stretch   30"x2 ea                   Hip flexor stretch standing HEP standing 30"x2 ea                   3-direction hip standing HEP 2 5# 2x10 ea                   Hip abd     sidelying 5"x10 ea sidelying 5" 2x10 ea               SLR     supine 5" x10 ea With TA brace 5"x10 ea               VMO SLR standing HEP Supine 2x10 supine 5"x10 ea With TA brace 5"x10 ea D/c             Mini squat HEP 2x10 mirror 2x10                 LAQ   2 5# 5" at top 2x10 ea 3# 5" 2x10 EA                 tball lumbar 3-way stretch     10"x5 ea x10 ea               bridge     5"x10 With TA brace 5" x10               HL lumbar rot       x10 ea                            Ankle CW, CCW     HEP x30 ea x30 ea x30 ea     Heel raises     seated HEP Seated x30 Seated x30 Seated 15# on knees 4" ecc   x20     Toe raise     seated HEP Seated x30 Seated x30 Seated 15# on knees x30     Calf stretch         HEP  wedge 30"x3  wedge 30"x3  wedge 30"x3        toe towel scrunch         HEP              rocker board DF/PF, lat           x20 ea  x20 ea  x25 ea        4-way tband ankle            otb 4" ecc x15 ea (HEP)  otb 4" ecc x15 ea otb 4" ecc x20 ea                                                                                                           Ther Activity                                                                       Gait Training                                                                       Modalities

## 2020-09-15 ENCOUNTER — APPOINTMENT (OUTPATIENT)
Dept: PHYSICAL THERAPY | Facility: CLINIC | Age: 70
End: 2020-09-15
Payer: MEDICARE

## 2020-09-17 ENCOUNTER — APPOINTMENT (OUTPATIENT)
Dept: PHYSICAL THERAPY | Facility: CLINIC | Age: 70
End: 2020-09-17
Payer: MEDICARE

## 2020-10-07 DIAGNOSIS — J45.21 MILD INTERMITTENT ASTHMA WITH ACUTE EXACERBATION: ICD-10-CM

## 2020-10-07 RX ORDER — MONTELUKAST SODIUM 10 MG/1
TABLET ORAL
Qty: 90 TABLET | Refills: 0 | OUTPATIENT
Start: 2020-10-07

## 2020-10-08 RX ORDER — MONTELUKAST SODIUM 10 MG/1
10 TABLET ORAL
Qty: 90 TABLET | Refills: 3 | Status: SHIPPED | OUTPATIENT
Start: 2020-10-08 | End: 2021-10-28 | Stop reason: SDUPTHER

## 2020-12-29 ENCOUNTER — LAB (OUTPATIENT)
Dept: LAB | Facility: HOSPITAL | Age: 70
End: 2020-12-29
Attending: INTERNAL MEDICINE
Payer: MEDICARE

## 2020-12-29 DIAGNOSIS — E78.00 PURE HYPERCHOLESTEROLEMIA: ICD-10-CM

## 2020-12-29 LAB
CHOLEST SERPL-MCNC: 208 MG/DL (ref 50–200)
HDLC SERPL-MCNC: 55 MG/DL
LDLC SERPL CALC-MCNC: 133 MG/DL (ref 0–100)
TRIGL SERPL-MCNC: 98 MG/DL

## 2020-12-29 PROCEDURE — 36415 COLL VENOUS BLD VENIPUNCTURE: CPT

## 2020-12-29 PROCEDURE — 80061 LIPID PANEL: CPT

## 2021-02-04 ENCOUNTER — OFFICE VISIT (OUTPATIENT)
Dept: OBGYN CLINIC | Facility: CLINIC | Age: 71
End: 2021-02-04
Payer: MEDICARE

## 2021-02-04 ENCOUNTER — APPOINTMENT (OUTPATIENT)
Dept: RADIOLOGY | Facility: CLINIC | Age: 71
End: 2021-02-04
Payer: MEDICARE

## 2021-02-04 VITALS
TEMPERATURE: 98 F | HEIGHT: 66 IN | SYSTOLIC BLOOD PRESSURE: 130 MMHG | BODY MASS INDEX: 32.14 KG/M2 | WEIGHT: 200 LBS | DIASTOLIC BLOOD PRESSURE: 82 MMHG

## 2021-02-04 DIAGNOSIS — M70.51 PES ANSERINUS BURSITIS OF RIGHT KNEE: ICD-10-CM

## 2021-02-04 DIAGNOSIS — M25.561 RIGHT KNEE PAIN, UNSPECIFIED CHRONICITY: Primary | ICD-10-CM

## 2021-02-04 DIAGNOSIS — M76.61 ACHILLES TENDINITIS OF RIGHT LOWER EXTREMITY: ICD-10-CM

## 2021-02-04 DIAGNOSIS — M25.561 RIGHT KNEE PAIN, UNSPECIFIED CHRONICITY: ICD-10-CM

## 2021-02-04 PROCEDURE — 73564 X-RAY EXAM KNEE 4 OR MORE: CPT

## 2021-02-04 PROCEDURE — 20610 DRAIN/INJ JOINT/BURSA W/O US: CPT | Performed by: PHYSICIAN ASSISTANT

## 2021-02-04 PROCEDURE — 99213 OFFICE O/P EST LOW 20 MIN: CPT | Performed by: ORTHOPAEDIC SURGERY

## 2021-02-04 RX ORDER — BETAMETHASONE SODIUM PHOSPHATE AND BETAMETHASONE ACETATE 3; 3 MG/ML; MG/ML
6 INJECTION, SUSPENSION INTRA-ARTICULAR; INTRALESIONAL; INTRAMUSCULAR; SOFT TISSUE
Status: COMPLETED | OUTPATIENT
Start: 2021-02-04 | End: 2021-02-04

## 2021-02-04 RX ORDER — LIDOCAINE HYDROCHLORIDE 10 MG/ML
7 INJECTION, SOLUTION INFILTRATION; PERINEURAL
Status: COMPLETED | OUTPATIENT
Start: 2021-02-04 | End: 2021-02-04

## 2021-02-04 RX ADMIN — BETAMETHASONE SODIUM PHOSPHATE AND BETAMETHASONE ACETATE 6 MG: 3; 3 INJECTION, SUSPENSION INTRA-ARTICULAR; INTRALESIONAL; INTRAMUSCULAR; SOFT TISSUE at 12:39

## 2021-02-04 RX ADMIN — LIDOCAINE HYDROCHLORIDE 7 ML: 10 INJECTION, SOLUTION INFILTRATION; PERINEURAL at 12:39

## 2021-02-04 NOTE — PATIENT INSTRUCTIONS
Use stationary bike  Use Voltaren gel or Aspercreme to right knee  Continue hamstring stretches for pes bursitis  Continue Achilles tendon stretches  Recommend using CAM walker when walking again

## 2021-02-04 NOTE — PROGRESS NOTES
Assessment:     1  Right knee pain, unspecified chronicity    2  Pes anserinus bursitis of right knee    3  Achilles tendinitis of right lower extremity        Plan:     Problem List Items Addressed This Visit        Musculoskeletal and Integument    Pes anserinus bursitis of right knee    Relevant Medications    lidocaine (XYLOCAINE) 1 % injection 7 mL (Completed)    betamethasone acetate-betamethasone sodium phosphate (CELESTONE) injection 6 mg (Completed)    Other Relevant Orders    Large joint arthrocentesis: R knee (Completed)    Achilles tendinitis of right lower extremity    Relevant Orders    Ambulatory referral to Physical Therapy      Other Visit Diagnoses     Right knee pain, unspecified chronicity    -  Primary    Relevant Orders    XR knee 4+ vw right injury            The patient was seen and examined by Dr Missy Covarrubias and myself  Findings consistent with aggravated right knee osteoarthritis, pes anserine bursitis and right Achilles tendinitis with Abad deformity  Findings and treatment options were discussed with the patient  X-rays were reviewed with her  Discussed that the new activity of walking has aggravated those areas  She was given a cortisone injection to the pes bursa today  She tolerated the procedure well  Advised to apply cold compress today  She was given a new prescription for formal physical therapy for the right Achilles since she only went for 4 sessions a few months ago  Advised patient to start using the Cam walker when ambulatingas well  Continue home exercises and stretches  Recommend use a stationary bicycle at home  Continue use of Aspercreme or Voltaren gel as needed  Follow-up in 6 weeks for re-evaluation  All questions were answered to patient's satisfaction  Subjective:     Patient ID: Sherif Cisneros is a 72 y o  female  Chief Complaint: This is a 46-year-old white female following up for right knee pain and right Achilles tendinitis    She had a nondisplaced proximal tibia fracture around 8 months ago  He healed well with conservative treatment  She then developed pes anserine bursitis and Achilles tendinitis  She had a cortisone injection to the right pes bursa in August 2020 with good results  She only went to 4 sessions of physical therapy for the right Achilles at that time and use the cam walker briefly  She states she started having increased pain in the right knee and right Achilles about 3 weeks ago  Patient states that she started walking more in her neighborhood on a flat surface  She walked 3 days in a row  She had a gradual onset of pain over the anterior knee and proximal tibia  The Achilles pain got worse as well  She feels sensitivity and pain when wearing sneakers that rubbing against her Achilles  There is improvement when she wears shoes that are open in the back  No recent treatment      Allergy:  Allergies   Allergen Reactions    Codeine Tachycardia     Has tolerated promethazine with codeine cough syrup    Etodolac      Annotation - [id number]: RASH    Tetracyclines & Related      Annotation - [id number]: RASH    Erythromycin Rash     Medications:  all current active meds have been reviewed  Past Medical History:  Past Medical History:   Diagnosis Date    Asthma     Bronchiectasis ([facility] [address] )     Closed femur fracture ([address] )     and humerus resolved: 1/13/2017    COPD [facility]chronic obstructive [facility] disease) [facility]     GERD (gastroesophageal reflux disease)     Influenza, pneumonia     resolved: 4/20/2017    Laryngeal spasm 2/22/2016    Leukocytosis 2/22/2016    Thyroid disorder     suppressed TSH    Traumatic arthropathy of left shoulder      Past Surgical History:  Past Surgical History:   Procedure Laterality Date    BREAST BIOPSY      pt unsure of laterality or dates    BREAST CYST ASPIRATION      pt unsure of laterality or date    CHOLECYSTECTOMY      COLON SURGERY      ERCP  06/2014    HYSTERECTOMY      approx age 28   [employer] JOINT REPLACEMENT  r hip replacement, fx r shoulder    OOPHORECTOMY      pt has one ovary, not certain of laterality    ORIF HIP FRACTURE Right     VOLVULUS REDUCTION       Family History:  Family History   Problem Relation Age of Onset    Pulmonary embolism Daughter     Crohn's disease Daughter     Pancreatic cancer Mother     Heart disease Father     Heart attack Family     No Known Problems Sister     Breast cancer Maternal Aunt 76    No Known Problems Sister     Substance Abuse Neg Hx         neg fam hx    Mental illness Neg Hx         neg fam hx     Social History:  Social History     Substance and Sexual Activity   Alcohol Use Yes    Frequency: Monthly or less    Drinks per session: 1 or 2    Binge frequency: Never    Comment: occasional     Social History     Substance and Sexual Activity   Drug Use No     Social History     Tobacco Use   Smoking Status Never Smoker   Smokeless Tobacco Never Used     Review of Systems   Constitutional: Negative  HENT: Negative  Eyes: Negative  Respiratory: Negative  Cardiovascular: Negative  Gastrointestinal: Negative  Endocrine: Negative  Genitourinary: Negative  Musculoskeletal: Positive for arthralgias and gait problem (antalgic)  Skin: Negative  Allergic/Immunologic: Negative  Hematological: Negative  Psychiatric/Behavioral: Negative  Objective:  BP Readings from Last 1 Encounters:   02/04/21 130/82      Wt Readings from Last 1 Encounters:   02/04/21 90 7 kg (200 lb)      BMI:   Estimated body mass index is 32 28 kg/m² as calculated from the following:    Height as of this encounter: 5' 6" (1 676 m)  Weight as of this encounter: 90 7 kg (200 lb)  BSA:   Estimated body surface area is 2 meters squared as calculated from the following:    Height as of this encounter: 5' 6" (1 676 m)  Weight as of this encounter: 90 7 kg (200 lb)     Physical Exam  Constitutional:       General: She is not in acute distress  Appearance: She is well-developed  HENT:      Head: Normocephalic  Eyes:      Conjunctiva/sclera: Conjunctivae normal       Pupils: Pupils are equal, round, and reactive to light  Pulmonary:      Effort: Pulmonary effort is normal  No respiratory distress  Musculoskeletal:         General: Tenderness present  Right knee: She exhibits no effusion  Skin:     General: Skin is warm and dry  Neurological:      Mental Status: She is alert and oriented to person, place, and time  Psychiatric:         Behavior: Behavior normal        Right Ankle Exam     Tenderness   Right ankle tenderness location: Achilles tendon and insertion  Swelling: mild (Soft tissue swelling insertion of Achilles)    Range of Motion   The patient has normal right ankle ROM  Muscle Strength   The patient has normal right ankle strength  Other   Erythema: absent  Scars: absent  Sensation: normal  Pulse: present     Comments:    Pain with passive dorsiflexion stretch   palpable Achilles tendon that is intact      Right Knee Exam     Tenderness   The patient is experiencing tenderness in the pes anserinus  Range of Motion   The patient has normal right knee ROM  Tests   Shant:  Medial - negative Lateral - negative  Varus: negative Valgus: negative    Other   Erythema: absent  Scars: absent  Sensation: normal  Pulse: present  Swelling: mild (anteromedial)  Effusion: no effusion present            I have personally reviewed pertinent films in PACS and my interpretation is X-rays of the right knee reveal mild tricompartmental osteoarthritis  No evidence of stress fracture  Large joint arthrocentesis: R knee  Universal Protocol:  Consent: Verbal consent obtained    Risks and benefits: risks, benefits and alternatives were discussed  Consent given by: patient  Patient understanding: patient states understanding of the procedure being performed    Supporting Documentation  Indications: pain   Procedure Details  Location: knee - R knee  Preparation: Patient was prepped and draped in the usual sterile fashion  Needle size: 22 G  Ultrasound guidance: no  Approach: anteromedial  Medications administered: 7 mL lidocaine 1 %; 6 mg betamethasone acetate-betamethasone sodium phosphate 6 (3-3) mg/mL    Patient tolerance: patient tolerated the procedure well with no immediate complications  Dressing:  Sterile dressing applied

## 2021-02-08 ENCOUNTER — EVALUATION (OUTPATIENT)
Dept: PHYSICAL THERAPY | Facility: CLINIC | Age: 71
End: 2021-02-08
Payer: MEDICARE

## 2021-02-08 DIAGNOSIS — M76.61 ACHILLES TENDINITIS OF RIGHT LOWER EXTREMITY: Primary | ICD-10-CM

## 2021-02-08 PROCEDURE — 97161 PT EVAL LOW COMPLEX 20 MIN: CPT | Performed by: PHYSICAL THERAPIST

## 2021-02-08 PROCEDURE — 97110 THERAPEUTIC EXERCISES: CPT | Performed by: PHYSICAL THERAPIST

## 2021-02-08 NOTE — PROGRESS NOTES
PT Evaluation     Today's date: 2021  Patient name: Breanna Younger  : 1950  MRN: [id number]  Referring provider: Kaylee Gann PA-C  Dx:   Encounter Diagnosis     ICD-10-CM    1  Achilles tendinitis of right lower extremity  M76 61 Ambulatory referral to Physical Therapy       Start Time: 1215  Stop Time: 1300  Total time in clinic (min): 45 minutes    Assessment  Assessment details: Breanna Younger is a 72 y o  female presenting to outpatient physical therapy at [place] [address] with complaints of R achilles pain secondary to chronic recurring achilles tendinitis   They present with decreased range of motion, decreased strength, limited flexibility, poor postural awareness, poor body mechanics, poor balance, decreased tolerance to activity and decreased functional mobility  Miki Evie would benefit from skilled physical therapy to address noted impairments in order to allow for full functional return to work and ADL-related activities  Thank you for the referral!  Impairments: abnormal gait, abnormal muscle firing, abnormal or restricted ROM, activity intolerance, impaired balance, impaired physical strength, lacks appropriate home exercise program, pain with function and poor body mechanics  Barriers to therapy: n/a  Understanding of Dx/Px/POC: excellent  Goals  ST   Independent with HEP in 2 weeks  2  Decrease pain by 50% in 3 wks  3   Increase R ankle ROM to WFL degrees in 3 weeks     LT  Achieve FOTO score of 58/100 in 6 weeks   2   Able to walk at least 0 5 miles without pain in 6 weeks  3  Strength = 4+/5 R hip ER in 6 weeks      Plan  Plan details: RE in 6 wks    Patient would benefit from: skilled physical therapy  Planned modality interventions: cryotherapy, electrical stimulation/Russian stimulation and thermotherapy: hydrocollator packs  Planned therapy interventions: abdominal trunk stabilization, manual therapy, neuromuscular re-education, therapeutic activities, therapeutic exercise, body mechanics training and home exercise program  Frequency: 2x week  Duration in visits: 12  Duration in weeks: 6  Plan of Care beginning date: 2/8/2021  Plan of Care expiration date: 3/26/2021  Treatment plan discussed with: patient        Subjective Evaluation    History of Present Illness  Mechanism of injury: 1 month ago pt notes she increased her activity with taking her dog for walks more frequently  Pt was seen by myself in this clinic in 2020 for her R knee s/p fx and R achilles  Pt had success with PT treatment  Since recent increase in activity, she notes flare up of pain in the R knee and achilles  She presents to PT wearing a soft knee stabilization brace  She saw Dr Pamela Hannah on 2/4/21 and received CSI in the R knee, which provided 50% relief  Dr also prescribed CAM walker boot for R foot for ambulation, which has eased intensity of pain while she is on her feet  She does note that wearing the boot aggravates her knee  Pt currently has PT orders for her R achilles tendinitis  Achilles pain is located distal achilles at the heel insertion  She notes a bony prominence at the distal achilles  Pain is a 1/10 at rest, 6/10 while walking  Aggravated with walking (immediately), being on her feet  She cannot walk more than 4 blocks due to pain  Eases with tylenol/ibuprofen, ice  Denies N/T, falls, feelings of instability, catching clicking or locking in the knee  She normally walks in a supportive sneaker, but in the house she prefers a slip on shoe because it does not rub the back of her heel    Patient Goals  Patient goals for therapy: decreased edema, decreased pain, improved balance, increased motion, return to sport/leisure activities, independence with ADLs/IADLs and increased strength          Objective     Observations     Additional Observation Details  Very mild swelling R distal achilles tendon    Tenderness     Additional Tenderness Details  TTP R distal achilles insertion    (-) maximo test    Negative for R knee ligament laxity  Neurological Testing     Sensation     Ankle/Foot   Left Ankle/Foot   Intact: light touch    Right Ankle/Foot   Intact: light touch     Active Range of Motion   Left Ankle/Foot   Normal active range of motion    Right Ankle/Foot   Inversion: WFL  Eversion: WFL    Additional Active Range of Motion Details  R DF, PF slightly restricted secondary to soft tissue tightness    Strength/Myotome Testing     Left Hip   Planes of Motion   Flexion: 5  Extension: 5  Abduction: 5  Adduction: 5  External rotation: 5  Internal rotation: 5    Right Hip   Planes of Motion   Flexion: 4+  Extension: 5  Abduction: 5  Adduction: 5  External rotation: 4  Internal rotation: 5    Left Knee   Flexion: 5  Extension: 4+  Quadriceps contraction: good    Right Knee   Flexion: 5  Extension: 4+  Quadriceps contraction: good    Left Ankle/Foot   Dorsiflexion: 5  Plantar flexion: 5  Inversion: 5  Eversion: 5  Great toe flexion: 5  Great toe extension: 5    Right Ankle/Foot   Dorsiflexion: 4+  Plantar flexion: 4+  Inversion: 5  Eversion: 5  Great toe flexion: 5  Great toe extension: 5    Additional Strength Details  Sartorius MMT 3+/5    Functional Assessment        Comments  Gait: decreased R push off in order to avoid loaded plantar flexion; R knee with decreased flexion during swing phase most likely secondary to knee brace    SLS: 30 sec bilaterally with intermittent UE or toe touch assist; R painful immediately      Flowsheet Rows      Most Recent Value   PT/OT G-Codes   Current Score  49   Projected Score  58             Precautions: h/o R knee fx (2020), R achilles tendinitis      HEP:  Access Code: [email or web address]   URL: [email or web address]   Date: 02/08/2021   Prepared by: Giovanni Herring     Exercises  Seated Heel Raise - 20 reps  Seated Heel Toe Raises - 20 reps  Seated Hip External Rotation AROM - 20 reps  Standing Eccentric Heel Raise - 15 reps - 5 Hold  Gastroc Stretch on Wall - 3 sets - 30 Hold  Soleus Stretch on Wall - 3 sets - 30 Hold       2/8            Manuals             R achilles IASTM                                                    Neuro Re-Ed             SLS             Foam walking             Rocker board f-b, s-s                                                                 Ther Ex             bike             gastroc stretch 30"            Soleus stretch 30"            Seated heel raise x20            Seated toe raise x20            Hip ER x15 AROM            Standing heel raise  4" ecc x15            5-way hip             Sartorius flexion                                                                                           Ther Activity                                       Gait Training                                       Modalities

## 2021-02-11 ENCOUNTER — OFFICE VISIT (OUTPATIENT)
Dept: PHYSICAL THERAPY | Facility: CLINIC | Age: 71
End: 2021-02-11
Payer: MEDICARE

## 2021-02-11 DIAGNOSIS — M76.61 ACHILLES TENDINITIS OF RIGHT LOWER EXTREMITY: Primary | ICD-10-CM

## 2021-02-11 PROCEDURE — 97112 NEUROMUSCULAR REEDUCATION: CPT | Performed by: PHYSICAL THERAPIST

## 2021-02-11 PROCEDURE — 97110 THERAPEUTIC EXERCISES: CPT | Performed by: PHYSICAL THERAPIST

## 2021-02-11 NOTE — PROGRESS NOTES
Daily Note     Today's date: 2021  Patient name: Tay Allen  : 1950  MRN: [id number]  Referring provider: Mattie Eckert PA-C  Dx:   Encounter Diagnosis     ICD-10-CM    1  Achilles tendinitis of right lower extremity  M76 61        Start Time: 1126          Subjective: Sore yesterday  The boot is uncomfortable so she d/c'd it and she has trialed walking without the knee brace as well and so far feels no different than when she is wearing the brace  Objective: See treatment diary below      Assessment: No boot or knee brace today  Tolerated treatment well  Added soleus PF with good tolerance  Pt noted R knee was more painful than her ankle throughout session  Seated hip ER AROM difficult secondary to weakness  Regressed to clamshell with better performance  Ended with R ankle/calf IASTM, assess response nv  Patient demonstrated fatigue post treatment and would benefit from continued PT      Plan: Continue per plan of care  Precautions: h/o R knee fx (), R achilles tendinitis      HEP:  Access Code: [email or web address]   URL: [email or web address]   Date: 2021   Prepared by: Giovanni Herring     Exercises  Seated Heel Raise - 20 reps  Seated Heel Toe Raises - 20 reps  Seated Hip External Rotation AROM - 20 reps  Standing Eccentric Heel Raise - 15 reps - 5 Hold  Gastroc Stretch on Wall - 3 sets - 30 Hold  Soleus Stretch on Wall - 3 sets - 30 Hold                  Manuals             R achilles IASTM  8'                                                  Neuro Re-Ed             SLS  30"x2 ea           Foam walking             Rocker board f-b, s-s  x30 ea                                                               Ther Ex             bike  7'           gastroc stretch 30" wedge 30"x2           Soleus stretch 30" wedge 30"x2           Seated heel raise/toe raise x20            Hip ER x15 AROM            clamshell  2x15           Standing heel raise  4" ecc x15 4" ecc x15 Soleus PF box  2W L3 4" ecc x15           5-way hip  x15 ea           Sartorius flexion                                                                                           Ther Activity                                       Gait Training                                       Modalities

## 2021-02-15 ENCOUNTER — OFFICE VISIT (OUTPATIENT)
Dept: PHYSICAL THERAPY | Facility: CLINIC | Age: 71
End: 2021-02-15
Payer: MEDICARE

## 2021-02-15 DIAGNOSIS — M76.61 ACHILLES TENDINITIS OF RIGHT LOWER EXTREMITY: Primary | ICD-10-CM

## 2021-02-15 PROCEDURE — 97112 NEUROMUSCULAR REEDUCATION: CPT | Performed by: PHYSICAL THERAPIST

## 2021-02-15 PROCEDURE — 97140 MANUAL THERAPY 1/> REGIONS: CPT | Performed by: PHYSICAL THERAPIST

## 2021-02-15 PROCEDURE — 97110 THERAPEUTIC EXERCISES: CPT | Performed by: PHYSICAL THERAPIST

## 2021-02-15 NOTE — PROGRESS NOTES
Daily Note     Today's date: 2/15/2021  Patient name: Breanna Younger  : 1950  MRN: [id number]  Referring provider: Kaylee Gann PA-C  Dx:   Encounter Diagnosis     ICD-10-CM    1  Achilles tendinitis of right lower extremity  M76 61        Start Time: 1210          Subjective: Heel/foot is feeling a little bit better  The knee continues to bother her  Objective: See treatment diary below      Assessment: Tolerated treatment well  Added manual prone sartorius stretch  She notes knee pain following therapy the past several sessions  No achilles pain  Add seated sartorius isometrics nv  Patient demonstrated fatigue post treatment and would benefit from continued PT      Plan: Continue per plan of care  Precautions: h/o R knee fx (), R achilles tendinitis      HEP:  Access Code: [email or web address]   URL: [email or web address]   Date: 2021   Prepared by: Fabiana Point     Exercises  Seated Heel Raise - 20 reps  Seated Heel Toe Raises - 20 reps  Seated Hip External Rotation AROM - 20 reps  Standing Eccentric Heel Raise - 15 reps - 5 Hold  Gastroc Stretch on Wall - 3 sets - 30 Hold  Soleus Stretch on Wall - 3 sets - 30 Hold       15          Manuals             R achilles IASTM  OBIE OBIE          Prone sartorius stretch   OBIE            8' 15'                       Neuro Re-Ed             SLS  30"x2 ea 30"x2 ea airex         Foam walking             Rocker board f-b, s-s  x30 ea x30 ea                                                              Ther Ex             bike  7' 7'          gastroc stretch 30" wedge 30"x2 wedge 30"x2          Soleus stretch 30" wedge 30"x2 wedge 30"x2          Seated heel raise/toe raise x20            Hip ER x15 AROM   iso at 90/90         clamshell  2x15 2x15 hold         Standing heel raise  4" ecc x15 4" ecc x15 4" ecc x15          Soleus PF box  2W L3 4" ecc x15 2W L3 4" ecc x15          5-way hip  x15 ea x15 ea          Sartorius flexion    iso Ther Activity                                       Gait Training                                       Modalities

## 2021-02-18 ENCOUNTER — OFFICE VISIT (OUTPATIENT)
Dept: PHYSICAL THERAPY | Facility: CLINIC | Age: 71
End: 2021-02-18
Payer: MEDICARE

## 2021-02-18 DIAGNOSIS — M76.61 ACHILLES TENDINITIS OF RIGHT LOWER EXTREMITY: Primary | ICD-10-CM

## 2021-02-18 PROCEDURE — 97112 NEUROMUSCULAR REEDUCATION: CPT | Performed by: PHYSICAL THERAPIST

## 2021-02-18 PROCEDURE — 97110 THERAPEUTIC EXERCISES: CPT | Performed by: PHYSICAL THERAPIST

## 2021-02-18 PROCEDURE — 97140 MANUAL THERAPY 1/> REGIONS: CPT | Performed by: PHYSICAL THERAPIST

## 2021-02-18 NOTE — PROGRESS NOTES
Daily Note     Today's date: 2021  Patient name: Serge Caraballo  : 1950  MRN: [id number]  Referring provider: Yvette Jc PA-C  Dx:   Encounter Diagnosis     ICD-10-CM    1  Achilles tendinitis of right lower extremity  M76 61        Start Time: 1013          Subjective: Doing well  Sore today with the weather  Objective: See treatment diary below      Assessment: Tolerated treatment well  Added isometric hip ER at 90/90 for strengthening  She noted feeling the exercise in her hip  Appropriately challenged  Hip fatigued with 5-way hip  She will benefit from further hip and knee strengthening  Continue nv  Patient demonstrated fatigue post treatment and would benefit from continued PT      Plan: Continue per plan of care  Precautions: h/o R knee fx (), R achilles tendinitis      HEP:  Access Code: [email or web address]   URL: [email or web address]   Date: 2021   Prepared by: Nasrin Sosa     Exercises  Seated Heel Raise - 20 reps  Seated Heel Toe Raises - 20 reps  Seated Hip External Rotation AROM - 20 reps  Standing Eccentric Heel Raise - 15 reps - 5 Hold  Gastroc Stretch on Wall - 3 sets - 30 Hold  Soleus Stretch on Wall - 3 sets - 30 Hold       2/8 2/11 2/15 2/18         Manuals             R achilles IASTM  OBIE OBIE OBIE         Prone sartorius stretch   OBIE OBIE           8' 15' 15'                      Neuro Re-Ed             SLS  30"x2 ea 30"x2 ea airex 30"x2 ea         Foam walking             Rocker board f-b, s-s  x30 ea x30 ea x30 ea                                                             Ther Ex             bike  7' 7' 7'         gastroc stretch 30" wedge 30"x2 wedge 30"x2 wedge 30"x2         Soleus stretch 30" wedge 30"x2 wedge 30"x2          Seated heel raise/toe raise x20            Hip ER x15 AROM   iso at 90/90 5"x15         clamshell  2x15 2x15          Standing heel raise  4" ecc x15 4" ecc x15 4" ecc x15 4" ecc 2x15         Soleus PF box  2W L3 4" ecc x15 2W L3 4" ecc x15 2W L3 4" ecc 2x15         5-way hip  x15 ea x15 ea x15 ea         Sartorius flexion                                                                                           Ther Activity                                       Gait Training                                       Modalities

## 2021-02-22 ENCOUNTER — OFFICE VISIT (OUTPATIENT)
Dept: PHYSICAL THERAPY | Facility: CLINIC | Age: 71
End: 2021-02-22
Payer: MEDICARE

## 2021-02-22 DIAGNOSIS — M76.61 ACHILLES TENDINITIS OF RIGHT LOWER EXTREMITY: Primary | ICD-10-CM

## 2021-02-22 PROCEDURE — 97110 THERAPEUTIC EXERCISES: CPT | Performed by: PHYSICAL THERAPIST

## 2021-02-22 PROCEDURE — 97112 NEUROMUSCULAR REEDUCATION: CPT | Performed by: PHYSICAL THERAPIST

## 2021-02-22 NOTE — PROGRESS NOTES
Daily Note     Today's date: 2021  Patient name: Laurita Fermin  : 1950  MRN: [id number]  Referring provider: Leeanna Kaiser PA-C  Dx:   Encounter Diagnosis     ICD-10-CM    1  Achilles tendinitis of right lower extremity  M76 61                   Subjective: Sore today after walking in the store shopping for about an hour this morning  It was the most walking she's done in the last couple weeks  Most of the time her knee is what bothers her most       Objective: See treatment diary below      Assessment: Tolerated treatment well  Some relief in knee following isometric sartorius flexion, but no change in hip sx  More focus on strength today, held on manual therapy  Suspect hip and knee pain to be secondary to arthritis  Assess response to session nv  Patient demonstrated fatigue post treatment and would benefit from continued PT      Plan: Continue per plan of care  Precautions: h/o R knee fx (), R achilles tendinitis      HEP:  Access Code: [email or web address]   URL: [email or web address]   Date: 2021   Prepared by: Dawit Aland     Exercises  Seated Heel Raise - 20 reps  Seated Heel Toe Raises - 20 reps  Seated Hip External Rotation AROM - 20 reps  Standing Eccentric Heel Raise - 15 reps - 5 Hold  Gastroc Stretch on Wall - 3 sets - 30 Hold  Soleus Stretch on Wall - 3 sets - 30 Hold       2/8 2/11 2/15 2/18 2/22        Manuals             R achilles IASTM  OBIE OBIE OBIE         Prone sartorius stretch   OBIE OBIE           8' 15' 15'                      Neuro Re-Ed             SLS  30"x2 ea 30"x2 ea airex 30"x2 ea airex 30"x2 ea        Foam walking             Rocker board f-b, s-s  x30 ea x30 ea x30 ea bosu dome down x30 ea                                                            Ther Ex             bike  7' 7' 7' 7'        gastroc stretch 30" wedge 30"x2 wedge 30"x2 wedge 30"x2 wedge 30"x2        Soleus stretch 30" wedge 30"x2 wedge 30"x2  Wedge 30"x2 ea        Seated heel raise/toe raise x20            Hip ER x15 AROM   iso at 90/90 5"x15         clamshell  2x15 2x15          Standing heel raise  4" ecc x15 4" ecc x15 4" ecc x15 4" ecc 2x15 4" ecc 2x15        Soleus PF box  2W L3 4" ecc x15 2W L3 4" ecc x15 2W L3 4" ecc 2x15 2W L3 4" ecc 2x15        5-way hip  x15 ea x15 ea x15 ea 2x15 ea        Sartorius flexion     manual iso hold 3x10                                                                                      Ther Activity                                       Gait Training                                       Modalities

## 2021-02-25 ENCOUNTER — OFFICE VISIT (OUTPATIENT)
Dept: PHYSICAL THERAPY | Facility: CLINIC | Age: 71
End: 2021-02-25
Payer: MEDICARE

## 2021-02-25 DIAGNOSIS — M76.61 ACHILLES TENDINITIS OF RIGHT LOWER EXTREMITY: Primary | ICD-10-CM

## 2021-02-25 PROCEDURE — 97110 THERAPEUTIC EXERCISES: CPT | Performed by: PHYSICAL THERAPIST

## 2021-02-25 PROCEDURE — 97112 NEUROMUSCULAR REEDUCATION: CPT | Performed by: PHYSICAL THERAPIST

## 2021-03-01 ENCOUNTER — OFFICE VISIT (OUTPATIENT)
Dept: PHYSICAL THERAPY | Facility: CLINIC | Age: 71
End: 2021-03-01
Payer: MEDICARE

## 2021-03-01 DIAGNOSIS — M76.61 ACHILLES TENDINITIS OF RIGHT LOWER EXTREMITY: Primary | ICD-10-CM

## 2021-03-01 PROCEDURE — 97110 THERAPEUTIC EXERCISES: CPT | Performed by: PHYSICAL THERAPIST

## 2021-03-01 PROCEDURE — 97112 NEUROMUSCULAR REEDUCATION: CPT | Performed by: PHYSICAL THERAPIST

## 2021-03-01 NOTE — PROGRESS NOTES
Daily Note     Today's date: 3/1/2021  Patient name: Tay Allen  : 1950  MRN: [id number]  Referring provider: Mattie Eckert PA-C  Dx:   Encounter Diagnosis     ICD-10-CM    1  Achilles tendinitis of right lower extremity  M76 61        Start Time: 1214          Subjective: Stabbing pain in the bilateral groin area with every step she took for 2 days following last session  Thinks it was the table exercises  Objective: See treatment diary below      Assessment: Tolerated treatment well  Continued with achilles strengthening as programmed  Decreased table strengthening therex volume and added butterfly hip adductor stretch  Assess response nv  Patient demonstrated fatigue post treatment and would benefit from continued PT      Plan: Continue per plan of care  Precautions: h/o R knee fx (), R achilles tendinitis      HEP:  Access Code: [email or web address]   URL: [email or web address]   Date: 2021   Prepared by: Giovanni Herring     Exercises  Seated Heel Raise - 20 reps  Seated Heel Toe Raises - 20 reps  Seated Hip External Rotation AROM - 20 reps  Standing Eccentric Heel Raise - 15 reps - 5 Hold  Gastroc Stretch on Wall - 3 sets - 30 Hold  Soleus Stretch on Wall - 3 sets - 30 Hold        2/15 2/18 2/22 2/25 3/1      Manuals             R achilles IASTM  OBIE OBIE OBIE         Prone sartorius stretch   OBIE OBIE           8' 15' 15'                      Neuro Re-Ed             SLS  30"x2 ea 30"x2 ea airex 30"x2 ea airex 30"x2 ea airex 30"x2 ea airex 30"x2 ea    LE cone taps x15 ea      Foam walking fwd, bwd, lat      x3 laps       Rocker board f-b, s-s  x30 ea x30 ea x30 ea bosu dome down x30 ea                                                            Ther Ex             bike  7' 7' 7' 7' 7' 7'      gastroc stretch 30" wedge 30"x2 wedge 30"x2 wedge 30"x2 wedge 30"x2 wedge 30"x2 wedge 30"x2      Soleus stretch 30" wedge 30"x2 wedge 30"x2  Wedge 30"x2 ea wedge 30"x2 wedge 30"x2      HS stretch strap      30"x2 ea 30"x2 ea      ITB stretch strap      30"x2 ea 30"x2 ea      Butterfly stretch       30"x2      Seated heel raise/toe raise x20            Hip ER x15 AROM   iso at 90/90 5"x15         clamshell  2x15 2x15          Standing heel raise  4" ecc x15 4" ecc x15 4" ecc x15 4" ecc 2x15 4" ecc 2x15 Incline 1  4" ecc  2x15 Incline 1  4" ecc   2x15      Soleus PF box  2W L3 4" ecc x15 2W L3 4" ecc x15 2W L3 4" ecc 2x15 2W L3 4" ecc 2x15 2W L3 4" ecc 2x15 2W L3 4" ecc 2x15      5-way hip  x15 ea x15 ea x15 ea 2x15 ea On table 2x15 ea On table x15 ea      Sartorius flexion     manual iso hold 3x10                                                                                      Ther Activity                                       Gait Training                                       Modalities

## 2021-03-04 ENCOUNTER — OFFICE VISIT (OUTPATIENT)
Dept: PHYSICAL THERAPY | Facility: CLINIC | Age: 71
End: 2021-03-04
Payer: MEDICARE

## 2021-03-04 DIAGNOSIS — Z23 ENCOUNTER FOR IMMUNIZATION: ICD-10-CM

## 2021-03-04 DIAGNOSIS — M76.61 ACHILLES TENDINITIS OF RIGHT LOWER EXTREMITY: Primary | ICD-10-CM

## 2021-03-04 PROCEDURE — 97112 NEUROMUSCULAR REEDUCATION: CPT | Performed by: PHYSICAL THERAPIST

## 2021-03-04 PROCEDURE — 97110 THERAPEUTIC EXERCISES: CPT | Performed by: PHYSICAL THERAPIST

## 2021-03-04 NOTE — PROGRESS NOTES
Daily Note     Today's date: 3/4/2021  Patient name: Kyrie Cook  : 1950  MRN: [id number]  Referring provider: Margarita Del Castillo PA-C  Dx:   Encounter Diagnosis     ICD-10-CM    1  Achilles tendinitis of right lower extremity  M76 61        Start Time: 1025          Subjective: Ankle and foot are doing well, it only hurts to touch but walking is fine  R Knee and hip were good after last session but overall she feels like she is having a good week  Objective: See treatment diary below      Assessment: Tolerated treatment well  Moderate L hip pain noted during table hip strength TE; d/c'd on the L for the rest of the session  She expressed frustration with the cyclical good weeks and bad weeks and feels like she is always having pain with something  Good tolerance to increased height of incline step heel raises  Added anterior hip stretch end of session  Assess response nv  Patient demonstrated fatigue post treatment and would benefit from continued PT      Plan: Continue per plan of care  Precautions: h/o R knee fx (), R achilles tendinitis      HEP:  Access Code: [email or web address]   URL: [email or web address]   Date: 2021   Prepared by: Livier Bond     Exercises  Seated Heel Raise - 20 reps  Seated Heel Toe Raises - 20 reps  Seated Hip External Rotation AROM - 20 reps  Standing Eccentric Heel Raise - 15 reps - 5 Hold  Gastroc Stretch on Wall - 3 sets - 30 Hold  Soleus Stretch on Wall - 3 sets - 30 Hold        2 215 218 2 3/ 3/4     Manuals             R achilles IASTM  OBIE OBIE OBIE         Prone sartorius stretch   OBIE OBIE           8' 15' 15'                      Neuro Re-Ed             SLS  30"x2 ea 30"x2 ea airex 30"x2 ea airex 30"x2 ea airex 30"x2 ea airex 30"x2 ea    LE cone taps x15 ea      Foam walking fwd, bwd, lat      x3 laps       Rocker board f-b, s-s  x30 ea x30 ea x30 ea bosu dome down x30 ea        Fitter circ CW,CCW        x30 ea Ther Ex             bike  7' 7' 7' 7' 7' 7' 7'     gastroc stretch 30" wedge 30"x2 wedge 30"x2 wedge 30"x2 wedge 30"x2 wedge 30"x2 wedge 30"x2 wedge 30"x2     Soleus stretch 30" wedge 30"x2 wedge 30"x2  Wedge 30"x2 ea wedge 30"x2 wedge 30"x2 wedge 30"x2     HS stretch strap      30"x2 ea 30"x2 ea 30"x2 ea     ITB stretch strap      30"x2 ea 30"x2 ea      Butterfly stretch       30"x2      Anterior hip stretch        Step 30"x2 ea     Seated heel raise/toe raise x20            Hip ER x15 AROM   iso at 90/90 5"x15         clamshell  2x15 2x15          Standing heel raise  4" ecc x15 4" ecc x15 4" ecc x15 4" ecc 2x15 4" ecc 2x15 Incline 1  4" ecc  2x15 Incline 1  4" ecc  2x15 Knee ext, bent    Incline 2  4" ecc   2x15     Soleus PF box  2W L3 4" ecc x15 2W L3 4" ecc x15 2W L3 4" ecc 2x15 2W L3 4" ecc 2x15 2W L3 4" ecc 2x15 2W L3 4" ecc 2x15      5-way hip  x15 ea x15 ea x15 ea 2x15 ea On table 2x15 ea On table x15 ea On table (R) x15 ea     Sartorius flexion     manual iso hold 3x10                                                                                      Ther Activity                                       Gait Training                                       Modalities

## 2021-03-08 ENCOUNTER — OFFICE VISIT (OUTPATIENT)
Dept: PHYSICAL THERAPY | Facility: CLINIC | Age: 71
End: 2021-03-08
Payer: MEDICARE

## 2021-03-08 DIAGNOSIS — M76.61 ACHILLES TENDINITIS OF RIGHT LOWER EXTREMITY: Primary | ICD-10-CM

## 2021-03-08 PROCEDURE — 97112 NEUROMUSCULAR REEDUCATION: CPT | Performed by: PHYSICAL THERAPIST

## 2021-03-08 PROCEDURE — 97110 THERAPEUTIC EXERCISES: CPT | Performed by: PHYSICAL THERAPIST

## 2021-03-08 NOTE — PROGRESS NOTES
Daily Note     Today's date: 3/8/2021  Patient name: Swathi Saenz  : 1950  MRN: [id number]  Referring provider: Josr Del Valle PA-C  Dx:   Encounter Diagnosis     ICD-10-CM    1  Achilles tendinitis of right lower extremity  M76 61        Start Time: 1445          Subjective: L hip and R knee continue to be sore  Ankle is doing well, she supposes it's currently 60% better  Objective: See treatment diary below      Assessment: Tolerated treatment well  Did not perform LE strengthening TE secondary to continued hip and knee soreness  She did well with lunges onto the bosu ball, stability was adequate and no pain noted  Appropriately challenged with all stability TE today  Ended with IASTM to achilles  RE nv  Patient demonstrated fatigue post treatment and would benefit from continued PT      Plan: Continue per plan of care  RE nv      Precautions: h/o R knee fx (), R achilles tendinitis      HEP:  Access Code: [email or web address]   URL: [email or web address]   Date: 2021   Prepared by: Aleksander Elliott     Exercises  Seated Heel Raise - 20 reps  Seated Heel Toe Raises - 20 reps  Seated Hip External Rotation AROM - 20 reps  Standing Eccentric Heel Raise - 15 reps - 5 Hold  Gastroc Stretch on Wall - 3 sets - 30 Hold  Soleus Stretch on Wall - 3 sets - 30 Hold       2/8 2/11 2/15 2/18 2/22 2/25 3/1 3/4 3/8    Manuals             R achilles IASTM  OBIE OBIE OBIE     OBIE    Prone sartorius stretch   OBIE OBIE           8' 15' 15'     5'                 Neuro Re-Ed             SLS  30"x2 ea 30"x2 ea airex 30"x2 ea airex 30"x2 ea airex 30"x2 ea airex 30"x2 ea    LE cone taps x15 ea  airex 30"x2 ea    Foam walking fwd, bwd, lat      x3 laps   x3 laps    Rocker board f-b, s-s  x30 ea x30 ea x30 ea bosu dome down x30 ea        Fitter circ CW,CCW        x30 ea x30 ea                                           Ther Ex             bike  7' 7' 7' 7' 7' 7' 7' 7'    gastroc stretch 30" wedge 30"x2 wedge 30"x2 wedge 30"x2 wedge 30"x2 wedge 30"x2 wedge 30"x2 wedge 30"x2 wedge 30"x2    Soleus stretch 30" wedge 30"x2 wedge 30"x2  Wedge 30"x2 ea wedge 30"x2 wedge 30"x2 wedge 30"x2 wedge 30"x2    HS stretch strap      30"x2 ea 30"x2 ea 30"x2 ea     ITB stretch strap      30"x2 ea 30"x2 ea      Butterfly stretch       30"x2      Anterior hip stretch        Step 30"x2 ea     Seated heel raise/toe raise x20            Hip ER x15 AROM   iso at 90/90 5"x15         clamshell  2x15 2x15          Standing heel raise  4" ecc x15 4" ecc x15 4" ecc x15 4" ecc 2x15 4" ecc 2x15 Incline 1  4" ecc  2x15 Incline 1  4" ecc  2x15 Knee ext, bent    Incline 2  4" ecc  2x15 Knee ext, bent    Incline 2  4" ecc   2x15    Soleus PF box  2W L3 4" ecc x15 2W L3 4" ecc x15 2W L3 4" ecc 2x15 2W L3 4" ecc 2x15 2W L3 4" ecc 2x15 2W L3 4" ecc 2x15      5-way hip  x15 ea x15 ea x15 ea 2x15 ea On table 2x15 ea On table x15 ea On table (R) x15 ea     Sartorius flexion     manual iso hold 3x10        Lunge fwd         bosu dome up x20 ea                                                                     Ther Activity                                       Gait Training                                       Modalities

## 2021-03-09 ENCOUNTER — IMMUNIZATIONS (OUTPATIENT)
Dept: FAMILY MEDICINE CLINIC | Facility: HOSPITAL | Age: 71
End: 2021-03-09

## 2021-03-09 DIAGNOSIS — Z23 ENCOUNTER FOR IMMUNIZATION: Primary | ICD-10-CM

## 2021-03-09 PROCEDURE — 0001A SARS-COV-2 / COVID-19 MRNA VACCINE (PFIZER-BIONTECH) 30 MCG: CPT

## 2021-03-09 PROCEDURE — 91300 SARS-COV-2 / COVID-19 MRNA VACCINE (PFIZER-BIONTECH) 30 MCG: CPT

## 2021-03-11 ENCOUNTER — EVALUATION (OUTPATIENT)
Dept: PHYSICAL THERAPY | Facility: CLINIC | Age: 71
End: 2021-03-11
Payer: MEDICARE

## 2021-03-11 DIAGNOSIS — M76.61 ACHILLES TENDINITIS OF RIGHT LOWER EXTREMITY: Primary | ICD-10-CM

## 2021-03-11 PROCEDURE — 97140 MANUAL THERAPY 1/> REGIONS: CPT | Performed by: PHYSICAL THERAPIST

## 2021-03-11 PROCEDURE — 97110 THERAPEUTIC EXERCISES: CPT | Performed by: PHYSICAL THERAPIST

## 2021-03-11 NOTE — PROGRESS NOTES
PT Re-Evaluation     Today's date: 3/11/2021  Patient name: Jazmin Carlson  : 1950  MRN: [id number]  Referring provider: Laura Clark PA-C  Dx:   Encounter Diagnosis     ICD-10-CM    1  Achilles tendinitis of right lower extremity  M76 61                   Assessment  Assessment details: Jazmin Carlson is a 72 y o  female presenting to outpatient physical therapy at [place] [address] with complaints of R achilles pain secondary to chronic recurring achilles tendinitis   They present with decreased range of motion, decreased strength, limited flexibility, poor postural awareness, poor body mechanics, poor balance, decreased tolerance to activity and decreased functional mobility  Ning Tejeda would benefit from skilled physical therapy to address noted impairments in order to allow for full functional return to work and ADL-related activities  Thank you for the referral!    RE: 3/11/21  You Villanueva has attended physical therapy for 10 visits  In this time, they have made significant improvements in symptoms and function, as noted by subjective report, improved balance, ROM, strength, flexibility and activity tolerance  They have made positive goal progress with FOTO score improvement from 49 at initial evaluation to 46 currently  They do continue to have impairments in pain, ROM, strength, balance, flexibility and activity tolerance  Recommend continued skilled PT in order to maximize function  Impairments: abnormal gait, abnormal muscle firing, abnormal or restricted ROM, activity intolerance, impaired balance, impaired physical strength, lacks appropriate home exercise program, pain with function and poor body mechanics  Barriers to therapy: n/a  Understanding of Dx/Px/POC: excellent  Goals  ST   Independent with HEP in 2 weeks - met  2  Decrease pain by 50% in 3 wks - met  3   Increase R ankle ROM to WFL degrees in 3 weeks - met    LT   Achieve FOTO score of 58/100 in 6 weeks - not met (52/100)  2   Able to walk at least 0 5 miles without pain in 6 weeks - not met  3  Strength = 4+/5 R hip ER in 6 weeks - not met      Plan  Plan details: RE in 5 wks  Patient would benefit from: skilled physical therapy  Planned modality interventions: cryotherapy, electrical stimulation/Russian stimulation and thermotherapy: hydrocollator packs  Planned therapy interventions: abdominal trunk stabilization, manual therapy, neuromuscular re-education, therapeutic activities, therapeutic exercise, body mechanics training and home exercise program  Frequency: 2x week  Duration in visits: 10  Duration in weeks: 5  Plan of Care beginning date: 3/11/2021  Plan of Care expiration date: 4/16/2021  Treatment plan discussed with: patient        Subjective Evaluation    History of Present Illness  Mechanism of injury: 1 month ago pt notes she increased her activity with taking her dog for walks more frequently  Pt was seen by myself in this clinic in 2020 for her R knee s/p fx and R achilles  Pt had success with PT treatment  Since recent increase in activity, she notes flare up of pain in the R knee and achilles  She presents to PT wearing a soft knee stabilization brace  She saw Dr Donte Brown on 2/4/21 and received CSI in the R knee, which provided 50% relief  Dr also prescribed CAM walker boot for R foot for ambulation, which has eased intensity of pain while she is on her feet  She does note that wearing the boot aggravates her knee  Pt currently has PT orders for her R achilles tendinitis  Achilles pain is located distal achilles at the heel insertion  She notes a bony prominence at the distal achilles  Pain is a 1/10 at rest, 6/10 while walking  Aggravated with walking (immediately), being on her feet  She cannot walk more than 4 blocks due to pain  Eases with tylenol/ibuprofen, ice  Denies N/T, falls, feelings of instability, catching clicking or locking in the knee      She normally walks in a supportive sneaker, but in the house she prefers a slip on shoe because it does not rub the back of her heel  RE: 3/11/21  Pt reports the pain in her foot has improved since therapy  She has had a recent flare up of pain in the foot and heel for an unknown reason  Overall she feels 60% better in the heel  She continues to have pain with weight bearing, walking and after prolonged activity  She has not tried walking further than 4 blocks, which was her limit noted at the IE  Avg pain level is 3/10  She would like to continue with therapy for the foot  She also c/o R knee and hip pain and would like to begin therapy for the R knee  She is understanding that it is the preference of myself as the physical therapist to only treat one injury at a time and agrees to complete rehab for the ankle prior to beginning rehab for her knee  Patient Goals  Patient goals for therapy: decreased edema, decreased pain, improved balance, increased motion, return to sport/leisure activities, independence with ADLs/IADLs and increased strength          Objective     Observations     Additional Observation Details  Very mild swelling R distal achilles tendon    Tenderness     Additional Tenderness Details  TTP R distal achilles insertion    (-) marsh test    Negative for R knee ligament laxity      Neurological Testing     Sensation     Ankle/Foot   Left Ankle/Foot   Intact: light touch    Right Ankle/Foot   Intact: light touch     Active Range of Motion   Left Ankle/Foot   Normal active range of motion    Right Ankle/Foot   Normal active range of motion    Strength/Myotome Testing     Left Hip   Planes of Motion   Flexion: 5  Extension: 5  Abduction: 5  Adduction: 5  External rotation: 5  Internal rotation: 5    Right Hip   Planes of Motion   Flexion: 5  Extension: 5  Abduction: 5  Adduction: 5  External rotation: 4  Internal rotation: 5    Left Knee   Flexion: 5  Extension: 5  Quadriceps contraction: good    Right Knee   Flexion: 5  Extension: 5  Quadriceps contraction: good    Left Ankle/Foot   Dorsiflexion: 5  Plantar flexion: 5  Inversion: 5  Eversion: 5  Great toe flexion: 5  Great toe extension: 5    Right Ankle/Foot   Dorsiflexion: 5  Plantar flexion: 5  Inversion: 5  Eversion: 5  Great toe flexion: 5  Great toe extension: 5    Additional Strength Details  Sartorius MMT 3+/5    Functional Assessment        Comments  Gait: decreased R push off in order to avoid loaded plantar flexion; R knee with decreased flexion during swing phase most likely secondary to knee brace    SLS: 30 sec bilaterally with intermittent UE or toe touch assist; R painful immediately             Precautions: h/o R knee fx (2020), R achilles tendinitis      HEP:  Access Code: [email or web address]   URL: [email or web address]   Date: 02/08/2021   Prepared by: Sean Sanchez     Exercises  Seated Heel Raise - 20 reps  Seated Heel Toe Raises - 20 reps  Seated Hip External Rotation AROM - 20 reps  Standing Eccentric Heel Raise - 15 reps - 5 Hold  Gastroc Stretch on Wall - 3 sets - 30 Hold  Soleus Stretch on Wall - 3 sets - 30 Hold       2/8 2/11 2/15 2/18 2/22 2/25 3/1 3/4 3/8 3/11   Manuals             R achilles IASTM  OBIE OBIE OBIE     OBIE    Prone sartorius stretch   OBIE OBIE         RE          OBIE     8' 15' 15'     5' 20'                Neuro Re-Ed             SLS  30"x2 ea 30"x2 ea airex 30"x2 ea airex 30"x2 ea airex 30"x2 ea airex 30"x2 ea    LE cone taps x15 ea  airex 30"x2 ea airex 30"x2 ea   Foam walking fwd, bwd, lat      x3 laps   x3 laps    [facility] board f-b, s-s  x30 ea x30 ea x30 ea bosu dome down x30 ea     Fitter circ x30 ea   Fitter circ CW,CCW        x30 ea x30 ea x30 ea                                          Ther Ex             bike  7' 7' 7' 7' 7' 7' 7' 7' 7'   gastroc stretch 30" wedge 30"x2 wedge 30"x2 wedge 30"x2 wedge 30"x2 wedge 30"x2 wedge 30"x2 wedge 30"x2 wedge 30"x2 Wedge 30"x2   Soleus stretch 30" wedge 30"x2 wedge 30"x2  Wedge 30"x2 ea wedge 30"x2 wedge 30"x2 wedge 30"x2 wedge 30"x2 Wedge 30"x2   HS stretch strap      30"x2 ea 30"x2 ea 30"x2 ea     ITB stretch strap      30"x2 ea 30"x2 ea      Butterfly stretch       30"x2      Anterior hip stretch        Step 30"x2 ea     Seated heel raise/toe raise x20            Hip ER x15 AROM   iso at 90/90 5"x15         clamshell  2x15 2x15          Standing heel raise  4" ecc x15 4" ecc x15 4" ecc x15 4" ecc 2x15 4" ecc 2x15 Incline 1  4" ecc  2x15 Incline 1  4" ecc  2x15 Knee ext, bent    Incline 2  4" ecc  2x15 Knee ext, bent    Incline 2  4" ecc  2x15 Knee ext, bent    Incline 2  4" ecc   2x15   Soleus PF box  2W L3 4" ecc x15 2W L3 4" ecc x15 2W L3 4" ecc 2x15 2W L3 4" ecc 2x15 2W L3 4" ecc 2x15 2W L3 4" ecc 2x15      5-way hip  x15 ea x15 ea x15 ea 2x15 ea On table 2x15 ea On table x15 ea On table (R) x15 ea     Sartorius flexion     manual iso hold 3x10        Lunge fwd         bosu dome up x20 ea                                                                     Ther Activity                                       Gait Training                                       Modalities

## 2021-03-15 ENCOUNTER — OFFICE VISIT (OUTPATIENT)
Dept: PHYSICAL THERAPY | Facility: CLINIC | Age: 71
End: 2021-03-15
Payer: MEDICARE

## 2021-03-15 DIAGNOSIS — M76.61 ACHILLES TENDINITIS OF RIGHT LOWER EXTREMITY: Primary | ICD-10-CM

## 2021-03-15 PROCEDURE — 97112 NEUROMUSCULAR REEDUCATION: CPT | Performed by: PHYSICAL THERAPIST

## 2021-03-15 PROCEDURE — 97110 THERAPEUTIC EXERCISES: CPT | Performed by: PHYSICAL THERAPIST

## 2021-03-15 NOTE — PROGRESS NOTES
Daily Note     Today's date: 3/15/2021  Patient name: Nicole Sunshine  : 1950  MRN: [id number]  Referring provider: Scooby Hamilton PA-C  Dx:   Encounter Diagnosis     ICD-10-CM    1  Achilles tendinitis of right lower extremity  M76 61        Start Time: 1212          Subjective: Foot is doing pretty good, knee is giving her more problems  Objective: See treatment diary below      Assessment: Tolerated treatment well  Fair performance with lunges, she has a tendency to maintain weight through balls of her feet and excessively translate her knee forward  Corrected with vc's to keep weight in the heels  Appropriately challenged with airex stability exercise  Continue nv  Patient demonstrated fatigue post treatment and would benefit from continued PT      Plan: Continue per plan of care  Precautions: h/o R knee fx (), R achilles tendinitis      HEP:  Access Code: [email or web address]   URL: [email or web address]   Date: 2021   Prepared by: [place] Socks     Exercises  Seated Heel Raise - 20 reps  Seated Heel Toe Raises - 20 reps  Seated Hip External Rotation AROM - 20 reps  Standing Eccentric Heel Raise - 15 reps - 5 Hold  Gastroc Stretch on Wall - 3 sets - 30 Hold  Soleus Stretch on Wall - 3 sets - 30 Hold       3/15        3/8 3/11   Manuals             R achilles IASTM         OBIE    RE          OBIE            5' 20'                Neuro Re-Ed             SLS airex 30"x2 ea        airex 30"x2 ea airex 30"x2 ea   Cone taps airex LE x30 ea            Foam walking fwd, bwd, lat x3 laps ea        x3 laps    Rocker board f-b, s-s Fitter circ x30 ea         Fitter circ x30 ea   Fitter circ CW,CCW x30 ea        x30 ea x30 ea                                          Ther Ex             bike 7'        7' 7'   gastroc stretch wedge 30"x2        wedge 30"x2 Wedge 30"x2   Soleus stretch wedge 30"x2        wedge 30"x2 Wedge 30"x2   Standing heel raise  Knee ext, bent    Incline 2  4" ecc   2x15 Knee ext, bent    Incline 2  4" ecc  2x15 Knee ext, bent    Incline 2  4" ecc   2x15   Lunge fwd, lat x15 ea        Fwd only bosu dome up x20 ea                                                                     Ther Activity                                       Gait Training                                       Modalities

## 2021-03-18 ENCOUNTER — OFFICE VISIT (OUTPATIENT)
Dept: OBGYN CLINIC | Facility: CLINIC | Age: 71
End: 2021-03-18
Payer: MEDICARE

## 2021-03-18 ENCOUNTER — OFFICE VISIT (OUTPATIENT)
Dept: PHYSICAL THERAPY | Facility: CLINIC | Age: 71
End: 2021-03-18
Payer: MEDICARE

## 2021-03-18 VITALS
WEIGHT: 200 LBS | SYSTOLIC BLOOD PRESSURE: 137 MMHG | DIASTOLIC BLOOD PRESSURE: 81 MMHG | HEART RATE: 96 BPM | HEIGHT: 66 IN | BODY MASS INDEX: 32.14 KG/M2 | TEMPERATURE: 99.3 F

## 2021-03-18 DIAGNOSIS — M76.61 ACHILLES TENDINITIS OF RIGHT LOWER EXTREMITY: Primary | ICD-10-CM

## 2021-03-18 DIAGNOSIS — M17.11 PRIMARY LOCALIZED OSTEOARTHRITIS OF RIGHT KNEE: ICD-10-CM

## 2021-03-18 DIAGNOSIS — M70.51 PES ANSERINUS BURSITIS OF RIGHT KNEE: ICD-10-CM

## 2021-03-18 PROCEDURE — 20610 DRAIN/INJ JOINT/BURSA W/O US: CPT | Performed by: PHYSICIAN ASSISTANT

## 2021-03-18 PROCEDURE — 97112 NEUROMUSCULAR REEDUCATION: CPT | Performed by: PHYSICAL THERAPIST

## 2021-03-18 PROCEDURE — 99213 OFFICE O/P EST LOW 20 MIN: CPT | Performed by: PHYSICIAN ASSISTANT

## 2021-03-18 PROCEDURE — 97110 THERAPEUTIC EXERCISES: CPT | Performed by: PHYSICAL THERAPIST

## 2021-03-18 RX ORDER — BETAMETHASONE SODIUM PHOSPHATE AND BETAMETHASONE ACETATE 3; 3 MG/ML; MG/ML
6 INJECTION, SUSPENSION INTRA-ARTICULAR; INTRALESIONAL; INTRAMUSCULAR; SOFT TISSUE
Status: COMPLETED | OUTPATIENT
Start: 2021-03-18 | End: 2021-03-18

## 2021-03-18 RX ORDER — LIDOCAINE HYDROCHLORIDE 10 MG/ML
7 INJECTION, SOLUTION INFILTRATION; PERINEURAL
Status: COMPLETED | OUTPATIENT
Start: 2021-03-18 | End: 2021-03-18

## 2021-03-18 RX ADMIN — LIDOCAINE HYDROCHLORIDE 7 ML: 10 INJECTION, SOLUTION INFILTRATION; PERINEURAL at 12:53

## 2021-03-18 RX ADMIN — BETAMETHASONE SODIUM PHOSPHATE AND BETAMETHASONE ACETATE 6 MG: 3; 3 INJECTION, SUSPENSION INTRA-ARTICULAR; INTRALESIONAL; INTRAMUSCULAR; SOFT TISSUE at 12:53

## 2021-03-18 NOTE — PROGRESS NOTES
Assessment:     1  Achilles tendinitis of right lower extremity    2  Pes anserinus bursitis of right knee    3  Primary localized osteoarthritis of right knee        Plan:     Problem List Items Addressed This Visit        Musculoskeletal and Integument    Pes anserinus bursitis of right knee    Achilles tendinitis of right lower extremity - Primary    Primary localized osteoarthritis of right knee    Relevant Medications    lidocaine (XYLOCAINE) 1 % injection 7 mL (Completed)    betamethasone acetate-betamethasone sodium phosphate (CELESTONE) injection 6 mg (Completed)    Other Relevant Orders    Ambulatory referral to Physical Therapy    Large joint arthrocentesis: R knee (Completed)            The patient was seen and examined by Dr Adair Sinha and myself  Findings consistent with aggravated right knee osteoarthritis, pes anserine bursitis and right Achilles tendinitis with Abad deformity  Findings and treatment options were discussed with the patient  Pes anserine bursitis has resolved with cortisone injection  Continue formal physical therapy for the right Achilles tendinitis that is improving  She was also given a prescription for them to start working on the right knee  She was given a cortisone injection to the right knee joint today  She tolerated the procedure well  Advised to apply cold compress today  Continue low-impact exercises  Follow-up as needed if symptoms worsen  Patient will be moving out of state within the next several weeks  All questions were answered to patient's satisfaction  Subjective:     Patient ID: Micah Mortensen is a 72 y o  female  Chief Complaint: This is a 43-year-old white female following up for right knee pain and right Achilles tendinitis  She has been attending formal physical therapy for the right Achilles over the past 6 weeks and feels about 60% improvement  She continues to fees sensitivity over the heel when wearing sneakers    She feels complete relief from the cortisone injection given to the pes bursa last visit  She complains now worsening pain over the anterior aspect the right knee  It is worse with prolonged walking or standing  It is dull and aching in nature  She denies any locking, catching or giving away      Allergy:  Allergies   Allergen Reactions    Codeine Tachycardia     Has tolerated promethazine with codeine cough syrup    Etodolac      Annotation - [id number]: RASH    Tetracyclines & Related      Annotation - [id number]: RASH    Erythromycin Rash     Medications:  all current active meds have been reviewed  Past Medical History:  Past Medical History:   Diagnosis Date    Asthma     [facility]ctasis ([facility]     Closed femur fracture ([facility] )     and humerus resolved: 1/13/2017    COPD [facility]chronic obstructive pulmonary disease) ([facility]     GERD (gastroesophageal reflux disease)     Influenza, pneumonia     resolved: 4/20/2017    Laryngeal spasm 2/22/2016    Leukocytosis 2/22/2016    Thyroid disorder     suppressed TSH    Traumatic arthropathy of left shoulder      Past Surgical History:  Past Surgical History:   Procedure Laterality Date    BREAST BIOPSY      pt unsure of laterality or dates    BREAST CYST ASPIRATION      pt unsure of laterality or date    CHOLECYSTECTOMY      COLON SURGERY      ERCP  06/2014    HYSTERECTOMY      approx age 28   [facility] JOINT REPLACEMENT  r hip replacement, fx r shoulder    OOPHORECTOMY      pt has one ovary, not certain of laterality    ORIF HIP FRACTURE Right     VOLVULUS REDUCTION       Family History:  Family History   Problem Relation Age of Onset    Pulmonary embolism Daughter     Crohn's disease Daughter     Pancreatic cancer Mother     Heart disease Father     Heart attack Family     No Known Problems Sister     Breast cancer Maternal Aunt 76    No Known Problems Sister     Substance Abuse Neg Hx         neg fam hx    Mental illness Neg Hx         neg fam hx     Social History:  Social History     Substance and Sexual Activity   Alcohol Use Yes    Frequency: Monthly or less    Drinks per session: 1 or 2    Binge frequency: Never    Comment: occasional     Social History     Substance and Sexual Activity   Drug Use No     Social History     Tobacco Use   Smoking Status Never Smoker   Smokeless Tobacco Never Used     Review of Systems   Constitutional: Negative  HENT: Negative  Eyes: Negative  Respiratory: Negative  Cardiovascular: Negative  Gastrointestinal: Negative  Endocrine: Negative  Genitourinary: Negative  Musculoskeletal: Positive for arthralgias  Skin: Negative  Allergic/Immunologic: Negative  Neurological: Negative  Hematological: Negative  Psychiatric/Behavioral: Negative  Objective:  BP Readings from Last 1 Encounters:   03/18/21 137/81      Wt Readings from Last 1 Encounters:   03/18/21 90 7 kg (200 lb)      BMI:   Estimated body mass index is 32 28 kg/m² as calculated from the following:    Height as of this encounter: 5' 6" (1 676 m)  Weight as of this encounter: 90 7 kg (200 lb)  BSA:   Estimated body surface area is 2 meters squared as calculated from the following:    Height as of this encounter: 5' 6" (1 676 m)  Weight as of this encounter: 90 7 kg (200 lb)  Physical Exam  Constitutional:       General: She is not in acute distress  Appearance: She is well-developed  HENT:      Head: Normocephalic  Eyes:      Conjunctiva/sclera: Conjunctivae normal       Pupils: Pupils are equal, round, and reactive to light  Pulmonary:      Effort: Pulmonary effort is normal  No respiratory distress  Musculoskeletal:         General: Tenderness present  Right knee: She exhibits no effusion  Skin:     General: Skin is warm and dry  Neurological:      Mental Status: She is alert and oriented to person, place, and time     Psychiatric:         Behavior: Behavior normal        Right Ankle Exam Tenderness   The patient is experiencing no tenderness  Swelling: mild (Soft tissue swelling insertion of Achilles)    Range of Motion   The patient has normal right ankle ROM  Muscle Strength   The patient has normal right ankle strength  Other   Erythema: absent  Scars: absent  Sensation: normal  Pulse: present     Comments:  No pain with passive dorsiflexion stretch   palpable Achilles tendon that is intact      Right Knee Exam     Tenderness   The patient is experiencing no tenderness  Range of Motion   The patient has normal right knee ROM  Tests   Shant:  Medial - negative Lateral - negative  Varus: negative Valgus: negative    Other   Erythema: absent  Scars: absent  Sensation: normal  Pulse: present  Swelling: mild (anteromedial)  Effusion: no effusion present    Comments:    Patellar grind            No new imaging  Large joint arthrocentesis: R knee  Universal Protocol:  Consent: Verbal consent obtained    Risks and benefits: risks, benefits and alternatives were discussed  Consent given by: patient  Patient understanding: patient states understanding of the procedure being performed    Supporting Documentation  Indications: pain   Procedure Details  Location: knee - R knee  Preparation: Patient was prepped and draped in the usual sterile fashion  Needle size: 22 G  Approach: anterolateral  Medications administered: 7 mL lidocaine 1 %; 6 mg betamethasone acetate-betamethasone sodium phosphate 6 (3-3) mg/mL    Patient tolerance: patient tolerated the procedure well with no immediate complications  Dressing:  Sterile dressing applied

## 2021-03-18 NOTE — PROGRESS NOTES
Daily Note     Today's date: 3/18/2021  Patient name: Breanna Younger  : 1950  MRN: [id number]  Referring provider: Kaylee Gann PA-C  Dx:   Encounter Diagnosis     ICD-10-CM    1  Achilles tendinitis of right lower extremity  M76 61        Start Time: 1044          Subjective: Ankle is doing well  Knee is bothering her more  She has an appt with orthopedics for a knee evaluation today after PT  Objective: See treatment diary below      Assessment: Tolerated treatment well  Continued with ankle stability and strength  Added airex squats and obstacle course involving elizabeth step overs onto the airex pad, which she performed well with  Continue nv  Patient demonstrated fatigue post treatment and would benefit from continued PT      Plan: Continue per plan of care  Precautions: h/o R knee fx (), R achilles tendinitis      HEP:  Access Code: [email or web address]   URL: [email or web address]   Date: 2021   Prepared by: Fabiana Point     Exercises  Seated Heel Raise - 20 reps  Seated Heel Toe Raises - 20 reps  Seated Hip External Rotation AROM - 20 reps  Standing Eccentric Heel Raise - 15 reps - 5 Hold  Gastroc Stretch on Wall - 3 sets - 30 Hold  Soleus Stretch on Wall - 3 sets - 30 Hold       3/15 3/18       3 3   Manuals             R achilles IASTM         OBIE    RE          OBIE            5' 20'                Neuro Re-Ed             SLS airex 30"x2 ea        airex 30"x2 ea airex 30"x2 ea   Cone taps airex LE x30 ea airex LE x15 ea           Foam walking fwd, bwd, lat x3 laps ea Floor elizabeth airex elizabeth floor step overs fwd, lat x20 ea       x3 laps    Rocker board f-b, s-s Fitter circ x30 ea Fitter circ x30 ea        Fitter circ x30 ea   Fitter circ CW,CCW x30 ea x30 ea       x30 ea x30 ea                                          Ther Ex             bike 7' 7'       7' 7'   gastroc stretch wedge 30"x2 Wedge 30"x2       wedge 30"x2 Wedge 30"x2   Soleus stretch wedge 30"x2 Wedge 30"x2       wedge 30"x2 Wedge 30"x2   Standing heel raise  Knee ext, bent    Incline 2  4" ecc  2x15 Knee ext, bent    Incline 2  4" ecc  2x15       Knee ext, bent    Incline 2  4" ecc  2x15 Knee ext, bent    Incline 2  4" ecc   2x15   Lunge fwd, lat x15 ea        Fwd only bosu dome up x20 ea    squat  airex 2x10                                                               Ther Activity                                       Gait Training                                       Modalities

## 2021-03-22 ENCOUNTER — OFFICE VISIT (OUTPATIENT)
Dept: PHYSICAL THERAPY | Facility: CLINIC | Age: 71
End: 2021-03-22
Payer: MEDICARE

## 2021-03-22 DIAGNOSIS — M76.61 ACHILLES TENDINITIS OF RIGHT LOWER EXTREMITY: Primary | ICD-10-CM

## 2021-03-22 PROCEDURE — 97140 MANUAL THERAPY 1/> REGIONS: CPT | Performed by: PHYSICAL THERAPIST

## 2021-03-22 NOTE — PROGRESS NOTES
PT Re-Evaluation  and PT Discharge    Today's date: 3/22/2021  Patient name: Burgess Andrew  : 1950  MRN: [id number]  Referring provider: Aston Welsh PA-C  Dx:   Encounter Diagnosis     ICD-10-CM    1  Achilles tendinitis of right lower extremity  M76 61        Start Time: 1215  Stop Time: 9634  Total time in clinic (min): 31 minutes    Assessment  Assessment details: Burgess Andrew is a 72 y o  female presenting to outpatient physical therapy at [place] [address] with complaints of R achilles pain secondary to chronic recurring achilles tendinitis   They present with decreased range of motion, decreased strength, limited flexibility, poor postural awareness, poor body mechanics, poor balance, decreased tolerance to activity and decreased functional mobility  Charmaine Rand would benefit from skilled physical therapy to address noted impairments in order to allow for full functional return to work and ADL-related activities  Thank you for the referral!    RE: 3/11/21  Gwyn Villanueva has attended physical therapy for 10 visits  In this time, they have made significant improvements in symptoms and function, as noted by subjective report, improved balance, ROM, strength, flexibility and activity tolerance  They have made positive goal progress with FOTO score improvement from 49 at initial evaluation to 46 currently  They do continue to have impairments in pain, ROM, strength, balance, flexibility and activity tolerance  Recommend continued skilled PT in order to maximize function  RE: 3/22/21  Burgess Andrew has attended physical therapy for 13 visits  In this time, they have made significant improvements in symptoms and function, as noted by subjective report, improved balance, ROM, strength, flexibility and activity tolerance  They have made positive goal progress  Recommend d/c to HEP at this time      Impairments: abnormal gait, abnormal muscle firing, abnormal or restricted ROM, activity intolerance, impaired balance, impaired physical strength, lacks appropriate home exercise program, pain with function and poor body mechanics  Barriers to therapy: n/a  Understanding of Dx/Px/POC: excellent  Goals  ST   Independent with HEP in 2 weeks - met  2  Decrease pain by 50% in 3 wks - met  3   Increase R ankle ROM to WFL degrees in 3 weeks - met    LT  Achieve FOTO score of 58/100 in 6 weeks - not met (52/100)  2   Able to walk at least 0 5 miles without pain in 6 weeks - not met  3  Strength = 4+/5 R hip ER in 6 weeks - not met      Plan  Plan details: D/c to HEP  Patient would benefit from: skilled physical therapy  Planned modality interventions: cryotherapy, electrical stimulation/Russian stimulation and thermotherapy: hydrocollator packs  Planned therapy interventions: abdominal trunk stabilization, manual therapy, neuromuscular re-education, therapeutic activities, therapeutic exercise, body mechanics training and home exercise program  Treatment plan discussed with: patient        Subjective Evaluation    History of Present Illness  Mechanism of injury: 1 month ago pt notes she increased her activity with taking her dog for walks more frequently  Pt was seen by myself in this clinic in  for her R knee s/p fx and R achilles  Pt had success with PT treatment  Since recent increase in activity, she notes flare up of pain in the R knee and achilles  She presents to PT wearing a soft knee stabilization brace  She saw Dr Lucius Julio on 21 and received CSI in the R knee, which provided 50% relief   also prescribed CAM walker boot for R foot for ambulation, which has eased intensity of pain while she is on her feet  She does note that wearing the boot aggravates her knee  Pt currently has PT orders for her R achilles tendinitis  Achilles pain is located distal achilles at the heel insertion  She notes a bony prominence at the distal achilles  Pain is a 1/10 at rest, 6/10 while walking   Aggravated with walking (immediately), being on her feet  She cannot walk more than 4 blocks due to pain  Eases with tylenol/ibuprofen, ice  Denies N/T, falls, feelings of instability, catching clicking or locking in the knee  She normally walks in a supportive sneaker, but in the house she prefers a slip on shoe because it does not rub the back of her heel  RE: 3/11/21  Pt reports the pain in her foot has improved since therapy  She has had a recent flare up of pain in the foot and heel for an unknown reason  Overall she feels 60% better in the heel  She continues to have pain with weight bearing, walking and after prolonged activity  She has not tried walking further than 4 blocks, which was her limit noted at the IE  Avg pain level is 3/10  She would like to continue with therapy for the foot  She also c/o R knee and hip pain and would like to begin therapy for the R knee  She is understanding that it is the preference of myself as the physical therapist to only treat one injury at a time and agrees to complete rehab for the ankle prior to beginning rehab for her knee  RE: 3/22/21  Pt reports the R ankle is 70% improved  She is having less intense and less frequent pain with walking  Overall her walking has been limited secondary to intense R knee pain  She feels confident that she will be able to continue making progress independently once she becomes more active as her knee improves as well  She is prepared for d/c to HEP at this time with plan for PT IE of the R knee during next PT appt    Patient Goals  Patient goals for therapy: decreased edema, decreased pain, improved balance, increased motion, return to sport/leisure activities, independence with ADLs/IADLs and increased strength          Objective     Observations     Additional Observation Details  Mild edematous bump R distal achilles    Tenderness     Additional Tenderness Details  TTP R distal achilles insertion    (-) maximo test    Negative for R knee ligament laxity  Neurological Testing     Sensation     Ankle/Foot   Left Ankle/Foot   Intact: light touch    Right Ankle/Foot   Intact: light touch     Active Range of Motion   Left Ankle/Foot   Normal active range of motion    Right Ankle/Foot   Normal active range of motion    Strength/Myotome Testing     Left Hip   Planes of Motion   Flexion: 5  Extension: 5  Abduction: 5  Adduction: 5  External rotation: 5  Internal rotation: 5    Right Hip   Planes of Motion   Flexion: 5  Extension: 5  Abduction: 5  Adduction: 5  External rotation: 4  Internal rotation: 5    Left Knee   Flexion: 5  Extension: 5  Quadriceps contraction: good    Right Knee   Flexion: 4+  Extension: 4+  Quadriceps contraction: good    Left Ankle/Foot   Dorsiflexion: 5  Plantar flexion: 5  Inversion: 5  Eversion: 5  Great toe flexion: 5  Great toe extension: 5    Right Ankle/Foot   Dorsiflexion: 5  Plantar flexion: 5  Inversion: 5  Eversion: 5  Great toe flexion: 5  Great toe extension: 5    Additional Strength Details  Sartorius MMT 3+/5    Functional Assessment        Comments  Gait: antalgic secondary to R knee pain    SLS: 30 sec bilaterally with intermittent UE or toe touch assist      Flowsheet Rows      Most Recent Value   PT/OT G-Codes   Current Score  99   Projected Score  66             Precautions: h/o R knee fx (2020), R achilles tendinitis      HEP:  Access Code: [email or web address]  URL: [email or web address]  Date: 03/22/2021  Prepared by: Nadja Fu    Exercises  Gastroc Stretch on Wall - 3 sets - 30 hold  Soleus Stretch on Wall - 3 sets - 30 hold  Standing Eccentric Heel Raise - 15 reps - 5 hold  Single Leg Stance with Support - 3 sets - 30 hold  Single Leg Balance on Pillow - 3 sets - 30 hold         3/15 3/18 3/22      3/8 3/11   Manuals             R achilles IASTM         OBIE    RE   Amanda Day       OBIE      30'      5' 20'                Neuro Re-Ed             SLS airex 30"x2 ea        airex 30"x2 ea airex 30"x2 ea   Cone taps airex LE x30 ea airex LE x15 ea           Foam walking fwd, bwd, lat x3 laps ea Floor elizabeth airex elizabeth floor step overs fwd, lat x20 ea       x3 laps    Rocker board f-b, s-s Fitter circ x30 ea Fitter circ x30 ea        Fitter circ x30 ea   Fitter circ CW,CCW x30 ea x30 ea       x30 ea x30 ea                                          Ther Ex             bike 7' 7' 7'      7' 7'   gastroc stretch wedge 30"x2 Wedge 30"x2       wedge 30"x2 Wedge 30"x2   Soleus stretch wedge 30"x2 Wedge 30"x2       wedge 30"x2 Wedge 30"x2   Standing heel raise  Knee ext, bent    Incline 2  4" ecc  2x15 Knee ext, bent    Incline 2  4" ecc  2x15       Knee ext, bent    Incline 2  4" ecc  2x15 Knee ext, bent    Incline 2  4" ecc   2x15   Lunge fwd, lat x15 ea        Fwd only bosu dome up x20 ea    squat  airex 2x10                                                               Ther Activity                                       Gait Training                                       Modalities

## 2021-03-23 DIAGNOSIS — K21.9 GASTROESOPHAGEAL REFLUX DISEASE: ICD-10-CM

## 2021-03-23 RX ORDER — OMEPRAZOLE 20 MG/1
20 CAPSULE, DELAYED RELEASE ORAL 2 TIMES DAILY
Qty: 180 CAPSULE | Refills: 3 | Status: SHIPPED | OUTPATIENT
Start: 2021-03-23

## 2021-03-25 ENCOUNTER — EVALUATION (OUTPATIENT)
Dept: PHYSICAL THERAPY | Facility: CLINIC | Age: 71
End: 2021-03-25
Payer: MEDICARE

## 2021-03-25 DIAGNOSIS — M17.11 PRIMARY LOCALIZED OSTEOARTHRITIS OF RIGHT KNEE: ICD-10-CM

## 2021-03-25 DIAGNOSIS — M25.562 CHRONIC PAIN OF LEFT KNEE: Primary | ICD-10-CM

## 2021-03-25 DIAGNOSIS — G89.29 CHRONIC PAIN OF LEFT KNEE: Primary | ICD-10-CM

## 2021-03-25 PROCEDURE — 97110 THERAPEUTIC EXERCISES: CPT | Performed by: PHYSICAL THERAPIST

## 2021-03-25 PROCEDURE — 97161 PT EVAL LOW COMPLEX 20 MIN: CPT | Performed by: PHYSICAL THERAPIST

## 2021-03-25 NOTE — PROGRESS NOTES
PT Evaluation     Today's date: 3/25/2021  Patient name: Jazmin Carlson  : 1950  MRN: [id number]  Referring provider: Laura Clark PA-C  Dx:   Encounter Diagnosis     ICD-10-CM    1  Chronic pain of left knee  M25 562     G89 29    2  Primary localized osteoarthritis of right knee  M17 11        Start Time: 1212  Stop Time: 1300  Total time in clinic (min): 48 minutes    Assessment  Assessment details: Jazmin Carlson is a 72 y o  female presenting to outpatient physical therapy at [place] [address] with complaints of chronic R knee pain   They present with decreased range of motion, decreased strength, limited flexibility, poor postural awareness, poor body mechanics, poor balance, decreased tolerance to activity and decreased functional mobility due to Chronic pain of left knee  (primary encounter diagnosis)  Primary localized osteoarthritis of right knee  Ning Tejeda would benefit from skilled physical therapy to address noted impairments in order to allow for full functional return to work and ADL-related activities  Thank you for the referral!  Impairments: abnormal gait, abnormal muscle firing, abnormal or restricted ROM, activity intolerance, impaired balance, impaired physical strength, lacks appropriate home exercise program, pain with function, weight-bearing intolerance and poor body mechanics  Barriers to therapy: n/a  Understanding of Dx/Px/POC: excellent  Goals  ST   Independent with HEP in 2 weeks  2  Decrease pain by 50% in 3 wks  3   Increase extension ROM by 5 degrees in 3 weeks   4  Increase flexion ROM by 20 degrees in 3 weeks  LT  Achieve FOTO score of 53/100 in 6 weeks   2   Able to perform sit to stand without pain in 6 weeks  3  Able to walk for household distances without pain in 6 weeks  Plan  Plan details: RE in 6 weeks    Patient would benefit from: skilled physical therapy  Planned modality interventions: cryotherapy, electrical stimulation/Cypriot stimulation and thermotherapy: hydrocollator packs  Planned therapy interventions: abdominal trunk stabilization, manual therapy, neuromuscular re-education, therapeutic activities, therapeutic exercise, body mechanics training and home exercise program  Frequency: 2x week  Duration in visits: 12  Duration in weeks: 6  Plan of Care beginning date: 3/25/2021  Plan of Care expiration date: 5/7/2021  Treatment plan discussed with: patient        Subjective Evaluation    History of Present Illness  Mechanism of injury: Pt c/o chronic R knee pain with recent exacerbation in the previous few months  She has a h/o R proximal tibial fx, R knee OA, pes anserine bursitis and right Achilles tendinitis with Abad deformity  She has been seen in this clinic for PT for the tibial fx, bursitis and achilles tendinitis with good results  February 2021 R knee imaging: Mild osteoarthritis medial compartment  She received a cortisone injection to R knee on 3/18  With very mild relief from injection  Pain surrounds knee joint anteriorly and mediall  2/10 pain with rest, 8/10 shooting pain with activity  Aggravated with bending up and down, standing, walking, stairs (step to pattern)  Eased mildly with ice and OTC pain medication  Denies falls, catching or locking  Occasional nonpainful clicking and feelings of instability secondary to weakness and pain  She is currently planning to move to [facility] in May so she is active in her home with packing, which also aggravates the knee  She also enjoys walking, which is very limited due to pain  Patient Goals  Patient goals for therapy: decreased edema, decreased pain, improved balance, increased motion, return to sport/leisure activities and increased strength          Objective     Tenderness     Additional Tenderness Details  TTP anterior joint line, medial joint line, pes anserine    Unable to assess ligament integrity secondary to pain      Active SLR R to 90 deg with mild extensor lag     Active Range of Motion   Left Knee   Flexion: 125 degrees WFL  Extension: 0 degrees WFL    Right Knee   Flexion: 94 degrees with pain  Extension: 8 degrees with pain    Strength/Myotome Testing     Left Hip   Planes of Motion   Flexion: 5  Extension: 4+  Abduction: 5  Adduction: 5  External rotation: 5  Internal rotation: 5    Right Hip   Planes of Motion   Flexion: 5  Extension: 4+  Abduction: 4+  Adduction: 5  External rotation: 4-  Internal rotation: 5    Left Knee   Flexion: 5  Extension: 5  Quadriceps contraction: good    Right Knee   Flexion: 5  Extension: 5  Quadriceps contraction: fair    Left Ankle/Foot   Dorsiflexion: 5    Right Ankle/Foot   Dorsiflexion: 5    Functional Assessment        Comments  Gait: antalgic, decreased RLE stance phase    Sit to stand: painful RLE             Precautions: h/o R knee medial OA, pes anserine bursitis, proximal tibia fx (2020), achilles tendinitis    HEP:  Access Code: [email or web address]  URL: [email or web address]  Date: 03/25/2021  Prepared by: Ousmane Ayers    Exercises  Hip Flexor Stretch on Step - 2 reps - 30 hold  Seated Hamstring Stretch - 2 reps - 30 hold  Supine Gluteus Stretch - 2 reps - 30 hold  Supine Quad Set - 15 reps - 5 hold  Seated Isometric Knee Extension - 3 reps - 45 hold         3/25            Manuals             R knee PROM OBIE                                                   Neuro Re-Ed             Quad set             SLS             Foam walk                                                                              Ther Ex             bike             Hip flexor stretch             HS stretch Strap 30"x2            glute stretch Cross-body 30"x2            LAQ iso at 60deg flex 3x45"                                                                                                       Ther Activity                                       Gait Training                                       Modalities

## 2021-03-29 ENCOUNTER — APPOINTMENT (OUTPATIENT)
Dept: PHYSICAL THERAPY | Facility: CLINIC | Age: 71
End: 2021-03-29
Payer: MEDICARE

## 2021-03-29 ENCOUNTER — IMMUNIZATIONS (OUTPATIENT)
Dept: FAMILY MEDICINE CLINIC | Facility: HOSPITAL | Age: 71
End: 2021-03-29

## 2021-03-29 DIAGNOSIS — Z23 ENCOUNTER FOR IMMUNIZATION: Primary | ICD-10-CM

## 2021-03-29 PROCEDURE — 91300 SARS-COV-2 / COVID-19 MRNA VACCINE (PFIZER-BIONTECH) 30 MCG: CPT

## 2021-03-29 PROCEDURE — 0002A SARS-COV-2 / COVID-19 MRNA VACCINE (PFIZER-BIONTECH) 30 MCG: CPT

## 2021-04-01 ENCOUNTER — OFFICE VISIT (OUTPATIENT)
Dept: PHYSICAL THERAPY | Facility: CLINIC | Age: 71
End: 2021-04-01
Payer: MEDICARE

## 2021-04-01 DIAGNOSIS — M25.562 CHRONIC PAIN OF LEFT KNEE: Primary | ICD-10-CM

## 2021-04-01 DIAGNOSIS — M17.11 PRIMARY LOCALIZED OSTEOARTHRITIS OF RIGHT KNEE: ICD-10-CM

## 2021-04-01 DIAGNOSIS — G89.29 CHRONIC PAIN OF LEFT KNEE: Primary | ICD-10-CM

## 2021-04-01 PROCEDURE — 97140 MANUAL THERAPY 1/> REGIONS: CPT | Performed by: PHYSICAL THERAPIST

## 2021-04-01 PROCEDURE — 97110 THERAPEUTIC EXERCISES: CPT | Performed by: PHYSICAL THERAPIST

## 2021-04-01 NOTE — PROGRESS NOTES
Daily Note     Today's date: 2021  Patient name: Eris De Leon  : 1950  MRN: [id number]  Referring provider: Vilinda Leventhal, PA-C  Dx:   Encounter Diagnosis     ICD-10-CM    1  Chronic pain of left knee  M25 562     G89 29    2  Primary localized osteoarthritis of right knee  M17 11        Start Time: 1501          Subjective: Had a day and a half of lower pain, but she is back to having pain in the knee today  Objective: See treatment diary below      Assessment: Tolerated treatment well  Patient demonstrated fatigue post treatment and would benefit from continued PT      Plan: Continue per plan of care  Precautions: h/o R knee medial OA, pes anserine bursitis, proximal tibia fx (), achilles tendinitis    HEP:  Access Code: [email or web address]  URL: [email or web address]  Date: 2021  Prepared by: Jose David Muller    Exercises  Hip Flexor Stretch on Step - 2 reps - 30 hold  Seated Hamstring Stretch - 2 reps - 30 hold  Supine Gluteus Stretch - 2 reps - 30 hold  Supine Quad Set - 15 reps - 5 hold  Seated Isometric Knee Extension - 3 reps - 45 hold         3/25 4/1           Manuals             R knee PROM OBIE OBIE           HS, glute stretch  OBIE           laser  OBIE             30'                        Neuro Re-Ed             Quad set  5"x20           SLS             Foam walk                                                                              Ther Ex             bike  8'           Hip flexor stretch  EOT w strap 30"x3           HS stretch Strap 30"x2            glute stretch Cross-body 30"x2            LAQ iso at 60deg flex 3x45" After laser x20                                                                                                      Ther Activity                                       Gait Training                                       Modalities

## 2021-04-05 ENCOUNTER — OFFICE VISIT (OUTPATIENT)
Dept: PHYSICAL THERAPY | Facility: CLINIC | Age: 71
End: 2021-04-05
Payer: MEDICARE

## 2021-04-05 DIAGNOSIS — M17.11 PRIMARY LOCALIZED OSTEOARTHRITIS OF RIGHT KNEE: ICD-10-CM

## 2021-04-05 DIAGNOSIS — G89.29 CHRONIC PAIN OF LEFT KNEE: Primary | ICD-10-CM

## 2021-04-05 DIAGNOSIS — M25.562 CHRONIC PAIN OF LEFT KNEE: Primary | ICD-10-CM

## 2021-04-05 PROCEDURE — 97140 MANUAL THERAPY 1/> REGIONS: CPT | Performed by: PHYSICAL THERAPIST

## 2021-04-05 PROCEDURE — 97110 THERAPEUTIC EXERCISES: CPT | Performed by: PHYSICAL THERAPIST

## 2021-04-05 NOTE — PROGRESS NOTES
Daily Note     Today's date: 2021  Patient name: Josiah Velasco  : 1950  MRN: [id number]  Referring provider: Jeanne Mazariegos PA-C  Dx:   Encounter Diagnosis     ICD-10-CM    1  Chronic pain of left knee  M25 562     G89 29    2  Primary localized osteoarthritis of right knee  M17 11        Start Time: 4825          Subjective: Had one bad day but overall has felt a little better since previous session  Objective: See treatment diary below      Assessment: Tolerated treatment well  Completed PROM and LE stretching  She is resistant to full knee extension stretching secondary to pain  Quad set fair  Ended with laser again due to positive results after previous session  Assess response nv  Patient demonstrated fatigue post treatment and would benefit from continued PT      Plan: Continue per plan of care  Precautions: h/o R knee medial OA, pes anserine bursitis, proximal tibia fx (), achilles tendinitis    HEP:  Access Code: [email or web address]  URL: [email or web address]  Date: 2021  Prepared by: Nadeen Ashton    Exercises  Hip Flexor Stretch on Step - 2 reps - 30 hold  Seated Hamstring Stretch - 2 reps - 30 hold  Supine Gluteus Stretch - 2 reps - 30 hold  Supine Quad Set - 15 reps - 5 hold  Seated Isometric Knee Extension - 3 reps - 45 hold         3/25 4/1 45          Manuals             R knee PROM OBIE OBIE OBIE          HS, glute stretch  OBIE OBIE          laser  OBIE RAGLAND            30' 30'                       Neuro Re-Ed             Quad set  5"x20 5"x20          SLS             Foam walk                                                                              Ther Ex             bike  8' 8'          Hip flexor stretch  EOT w strap 30"x3           HS stretch Strap 30"x2            glute stretch Cross-body 30"x2            LAQ iso at 60deg flex 3x45" After laser x20 After laser x30 Ther Activity                                       Gait Training                                       Modalities

## 2021-04-08 ENCOUNTER — OFFICE VISIT (OUTPATIENT)
Dept: PHYSICAL THERAPY | Facility: CLINIC | Age: 71
End: 2021-04-08
Payer: MEDICARE

## 2021-04-08 DIAGNOSIS — M17.11 PRIMARY LOCALIZED OSTEOARTHRITIS OF RIGHT KNEE: ICD-10-CM

## 2021-04-08 DIAGNOSIS — G89.29 CHRONIC PAIN OF LEFT KNEE: Primary | ICD-10-CM

## 2021-04-08 DIAGNOSIS — M25.562 CHRONIC PAIN OF LEFT KNEE: Primary | ICD-10-CM

## 2021-04-08 PROCEDURE — 97110 THERAPEUTIC EXERCISES: CPT | Performed by: PHYSICAL THERAPIST

## 2021-04-08 PROCEDURE — 97140 MANUAL THERAPY 1/> REGIONS: CPT | Performed by: PHYSICAL THERAPIST

## 2021-04-08 NOTE — PROGRESS NOTES
Daily Note     Today's date: 2021  Patient name: Antonio El  : 1950  MRN: [id number]  Referring provider: Ofelia Villanueva PA-C  Dx:   Encounter Diagnosis     ICD-10-CM    1  Chronic pain of left knee  M25 562     G89 29    2  Primary localized osteoarthritis of right knee  M17 11        Start Time: 1002          Subjective: Pain Monday after session, but is feeling okay today  Objective: See treatment diary below      Assessment: Tolerated treatment well  Less time spent on manual therapy in order to transition to self stretching and strengthening  She had pain in the hip flexor with SLR, but she was able to tolerate all other TE  Ended with laser, then LAQ isometrics for tendon loading  Assess response nv  Patient demonstrated fatigue post treatment and would benefit from continued PT      Plan: Continue per plan of care  Precautions: h/o R knee medial OA, pes anserine bursitis, proximal tibia fx (), achilles tendinitis    HEP:  Access Code: [email or web address]  URL: [email or web address]  Date: 2021  Prepared by: Fabrizio Can    Exercises  Hip Flexor Stretch on Step - 2 reps - 30 hold  Seated Hamstring Stretch - 2 reps - 30 hold  Supine Gluteus Stretch - 2 reps - 30 hold  Supine Quad Set - 15 reps - 5 hold  Seated Isometric Knee Extension - 3 reps - 45 hold         3/25 4/1 4/5 4         Manuals             R knee PROM OBIE OBIE OBIE OBIE         HS, glute stretch  OBIE OBIE OBIE         laser  OBIE RAGLAND           30' 30' 15'                      Neuro Re-Ed             Quad set  5"x20 5"x20 5"x20         SLS             Foam walk                                                                              Ther Ex             bike  8' 8' 8'         Hip flexor stretch  EOT w strap 30"x3           HS stretch Strap 30"x2   Strap 20"x3         glute stretch Cross-body 30"x2            SLR, VMO SLR    x10 ea         S/L hip abd    x10         bridge    x10         LAQ iso at 60deg flex 3x45" After laser x20 After laser x30 After laser iso 45"x3                                                                                                    Ther Activity                                       Gait Training                                       Modalities

## 2021-04-12 ENCOUNTER — OFFICE VISIT (OUTPATIENT)
Dept: PHYSICAL THERAPY | Facility: CLINIC | Age: 71
End: 2021-04-12
Payer: MEDICARE

## 2021-04-12 DIAGNOSIS — M25.562 CHRONIC PAIN OF LEFT KNEE: Primary | ICD-10-CM

## 2021-04-12 DIAGNOSIS — M17.11 PRIMARY LOCALIZED OSTEOARTHRITIS OF RIGHT KNEE: ICD-10-CM

## 2021-04-12 DIAGNOSIS — G89.29 CHRONIC PAIN OF LEFT KNEE: Primary | ICD-10-CM

## 2021-04-12 DIAGNOSIS — M76.61 ACHILLES TENDINITIS OF RIGHT LOWER EXTREMITY: ICD-10-CM

## 2021-04-12 PROCEDURE — 97110 THERAPEUTIC EXERCISES: CPT | Performed by: PHYSICAL THERAPIST

## 2021-04-12 PROCEDURE — 97140 MANUAL THERAPY 1/> REGIONS: CPT | Performed by: PHYSICAL THERAPIST

## 2021-04-12 NOTE — PROGRESS NOTES
Daily Note     Today's date: 2021  Patient name: Servando May  : 1950  MRN: [id number]  Referring provider: Letty Holcomb PA-C  Dx:   Encounter Diagnosis     ICD-10-CM    1  Chronic pain of left knee  M25 562     G89 29    2  Primary localized osteoarthritis of right knee  M17 11    3  Achilles tendinitis of right lower extremity  M76 61        Start Time: 915          Subjective: Had a really good day on Saturday and  morning was also good, but she began to have pain towards midday  She is doing okay this morning  Overall mornings are becoming better  Objective: See treatment diary below      Assessment: Tolerated treatment well  Better tolerance to PROM today with less pain noted during SLR as well  Though she continues to have pain with full extension  Again Ended with laser, then LAQ isometrics for tendon loading  Assess response nv  Patient demonstrated fatigue post treatment and would benefit from continued PT      Plan: Continue per plan of care  Precautions: h/o R knee medial OA, pes anserine bursitis, proximal tibia fx (), achilles tendinitis    HEP:  Access Code: QDAKHTNL  URL: [email or web address]  Date: 2021  Prepared by: Shiv Shah    Exercises  Hip Flexor Stretch on Step - 2 reps - 30 hold  Seated Hamstring Stretch - 2 reps - 30 hold  Supine Gluteus Stretch - 2 reps - 30 hold  Supine Quad Set - 15 reps - 5 hold  Seated Isometric Knee Extension - 3 reps - 45 hold         3/25 4/1 4/5 4/8 4/12        Manuals             R knee PROM Baldev RAGLAND        HS, glute stretch  [address] OBIE RAGLAND        laser  Baldev RAGLAND          30' 30' 15' 15'                     Neuro Re-Ed             Quad set  5"x20 5"x20 5"x20 5"x20        SLS             Foam walk                                                                              Ther Ex             bike  8' 8' 8' 8'        Hip flexor stretch  EOT w strap 30"x3           HS stretch Strap 30"x2   Strap 20"x3 Strap 20"x3        glute stretch Cross-body 30"x2            SLR, VMO SLR    x10 ea x10 ea        S/L hip abd    x10 x10        bridge    x10 5" x10        LAQ iso at 60deg flex 3x45" After laser x20 After laser x30 After laser iso 45"x3 After laser iso 45"x3                                                                                                   Ther Activity                                       Gait Training                                       Modalities

## 2021-04-15 ENCOUNTER — OFFICE VISIT (OUTPATIENT)
Dept: PHYSICAL THERAPY | Facility: CLINIC | Age: 71
End: 2021-04-15
Payer: MEDICARE

## 2021-04-15 DIAGNOSIS — M17.11 PRIMARY LOCALIZED OSTEOARTHRITIS OF RIGHT KNEE: ICD-10-CM

## 2021-04-15 DIAGNOSIS — M76.61 ACHILLES TENDINITIS OF RIGHT LOWER EXTREMITY: ICD-10-CM

## 2021-04-15 DIAGNOSIS — G89.29 CHRONIC PAIN OF LEFT KNEE: Primary | ICD-10-CM

## 2021-04-15 DIAGNOSIS — M25.562 CHRONIC PAIN OF LEFT KNEE: Primary | ICD-10-CM

## 2021-04-15 PROCEDURE — 97112 NEUROMUSCULAR REEDUCATION: CPT | Performed by: PHYSICAL THERAPIST

## 2021-04-15 PROCEDURE — 97140 MANUAL THERAPY 1/> REGIONS: CPT | Performed by: PHYSICAL THERAPIST

## 2021-04-15 PROCEDURE — 97110 THERAPEUTIC EXERCISES: CPT | Performed by: PHYSICAL THERAPIST

## 2021-04-15 NOTE — PROGRESS NOTES
Daily Note     Today's date: 4/15/2021  Patient name: Catherine Dallas  : 1950  MRN: [id number]  Referring provider: Sonya Allen PA-C  Dx:   Encounter Diagnosis     ICD-10-CM    1  Chronic pain of left knee  M25 562     G89 29    2  Primary localized osteoarthritis of right knee  M17 11    3  Achilles tendinitis of right lower extremity  M76 61        Start Time: 6043          Subjective: Doing okay today  Objective: See treatment diary below      Assessment: Tolerated treatment well  Some restriction in patellar mobility in all directions, relieved with mobilizations  Able to tolerate increased reps today  Quad set to full extension is painful  Again Ended with laser, then LAQ isometrics  Assess response nv  Patient demonstrated fatigue post treatment and would benefit from continued PT      Plan: Continue per plan of care  Precautions: h/o R knee medial OA, pes anserine bursitis, proximal tibia fx (), achilles tendinitis    HEP:  Access Code: QDAKHTNL  URL: [email or web address]  Date: 2021  Prepared by: Ida Cash    Exercises  Hip Flexor Stretch on Step - 2 reps - 30 hold  Seated Hamstring Stretch - 2 reps - 30 hold  Supine Gluteus Stretch - 2 reps - 30 hold  Supine Quad Set - 15 reps - 5 hold  Seated Isometric Knee Extension - 3 reps - 45 hold         3/25 4/1 415       Manuals             R knee PROM [address] [facility] OBIE OBIE       HS, glute stretch  [address]        Patellar mob      [address] [facility] OBIE OBIE         30' 30' 15' 15' 15'                    Neuro Re-Ed             Quad set  5"x20 5"x20 5"x20 5"x20 10"x10       SLS             Foam walk                                                                              Ther Ex             bike  8' 8' 8' 8' 8'       Hip flexor stretch  EOT w strap 30"x3           HS stretch Strap 30"x2   Strap 20"x3 Strap 20"x3 Strap 30"x3       glute stretch Cross-body 30"x2            SLR, VMO SLR    x10 ea x10 ea x12 ea       S/L hip abd    x10 x10 x12 ea       bridge    x10 5" x10 5" x12       LAQ iso at 60deg flex 3x45" After laser x20 After laser x30 After laser iso 45"x3 After laser iso 45"x3 After laser iso 45"x4                                                                                                  Ther Activity                                       Gait Training                                       Modalities

## 2021-04-19 ENCOUNTER — OFFICE VISIT (OUTPATIENT)
Dept: PHYSICAL THERAPY | Facility: CLINIC | Age: 71
End: 2021-04-19
Payer: MEDICARE

## 2021-04-19 DIAGNOSIS — M17.11 PRIMARY LOCALIZED OSTEOARTHRITIS OF RIGHT KNEE: ICD-10-CM

## 2021-04-19 DIAGNOSIS — G89.29 CHRONIC PAIN OF LEFT KNEE: Primary | ICD-10-CM

## 2021-04-19 DIAGNOSIS — M76.61 ACHILLES TENDINITIS OF RIGHT LOWER EXTREMITY: ICD-10-CM

## 2021-04-19 DIAGNOSIS — M25.562 CHRONIC PAIN OF LEFT KNEE: Primary | ICD-10-CM

## 2021-04-19 PROCEDURE — 97140 MANUAL THERAPY 1/> REGIONS: CPT | Performed by: PHYSICAL THERAPIST

## 2021-04-19 PROCEDURE — 97110 THERAPEUTIC EXERCISES: CPT | Performed by: PHYSICAL THERAPIST

## 2021-04-19 NOTE — PROGRESS NOTES
Daily Note     Today's date: 2021  Patient name: Gabriela Hinkle  : 1950  MRN: [id number]  Referring provider: Marco Mead PA-C  Dx:   Encounter Diagnosis     ICD-10-CM    1  Chronic pain of left knee  M25 562     G89 29    2  Primary localized osteoarthritis of right knee  M17 11    3  Achilles tendinitis of right lower extremity  M76 61        Start Time: 925          Subjective: Feeling good today  Had a horrible day Saturday, could not be on her feet at all; unsure of reason  Objective: See treatment diary below      Assessment: Tolerated treatment well  Better patellar mobility  Trialed ending with TENS and CP today, assess response nv  Patient demonstrated fatigue post treatment and would benefit from continued PT      Plan: Continue per plan of care  Precautions: h/o R knee medial OA, pes anserine bursitis, proximal tibia fx (), achilles tendinitis    HEP:  Access Code: [email or web address]  URL: [email or web address]  Date: 2021  Prepared by: Ana Grant    Exercises  Hip Flexor Stretch on Step - 2 reps - 30 hold  Seated Hamstring Stretch - 2 reps - 30 hold  Supine Gluteus Stretch - 2 reps - 30 hold  Supine Quad Set - 15 reps - 5 hold  Seated Isometric Knee Extension - 3 reps - 45 hold         3/25 4/1 4/5 4/8 4/12 4/15 4/19      Manuals             R knee PROM [address] [facility] [address] RIBERA, glute stretch  [address]        Patellar mob      [address] OBIE OBIE        30' 26' 15' 15' 15' 10'                   Neuro Re-Ed             Quad set  5"x20 5"x20 5"x20 5"x20 10"x10       SLS             Foam walk                                                                              Ther Ex             bike  8' 8' 8' 8' 8' 8'      Hip flexor stretch  EOT w strap 30"x3           HS stretch Strap 30"x2   Strap 20"x3 Strap 20"x3 Strap 30"x3 Strap 30"x3      glute stretch Cross-body 30"x2            SLR, VMO SLR    x10 ea x10 ea x12 ea x12 ea      S/L hip abd    x10 x10 x12 ea       bridge    x10 5" x10 5" x12 5" x12      LAQ iso at 60deg flex 3x45" After laser x20 After laser x30 After laser iso 45"x3 After laser iso 45"x3 After laser iso 45"x4 After laser     iso 45"x4                                                                                                 Ther Activity                                       Gait Training                                       Modalities             CP+TENS       10'

## 2021-04-22 ENCOUNTER — OFFICE VISIT (OUTPATIENT)
Dept: PHYSICAL THERAPY | Facility: CLINIC | Age: 71
End: 2021-04-22
Payer: MEDICARE

## 2021-04-22 DIAGNOSIS — G89.29 CHRONIC PAIN OF LEFT KNEE: Primary | ICD-10-CM

## 2021-04-22 DIAGNOSIS — M76.61 ACHILLES TENDINITIS OF RIGHT LOWER EXTREMITY: ICD-10-CM

## 2021-04-22 DIAGNOSIS — M17.11 PRIMARY LOCALIZED OSTEOARTHRITIS OF RIGHT KNEE: ICD-10-CM

## 2021-04-22 DIAGNOSIS — M25.562 CHRONIC PAIN OF LEFT KNEE: Primary | ICD-10-CM

## 2021-04-22 PROCEDURE — 97140 MANUAL THERAPY 1/> REGIONS: CPT | Performed by: PHYSICAL THERAPIST

## 2021-04-22 NOTE — PROGRESS NOTES
PT Re-Evaluation  and PT Discharge    Today's date: 2021  Patient name: Stefanie Amaya  : 1950  MRN: [id number]  Referring provider: Lisa Robert PA-C  Dx:   Encounter Diagnosis     ICD-10-CM    1  Chronic pain of left knee  M25 562     G89 29    2  Primary localized osteoarthritis of right knee  M17 11    3  Achilles tendinitis of right lower extremity  M76 61        Start Time: 829          Assessment  Assessment details: Stefanie Amaya is a 72 y o  female presenting to outpatient physical therapy at [place] [address] with complaints of chronic R knee pain   They present with decreased range of motion, decreased strength, limited flexibility, poor postural awareness, poor body mechanics, poor balance, decreased tolerance to activity and decreased functional mobility due to Chronic pain of left knee  (primary encounter diagnosis)  Primary localized osteoarthritis of right knee  Cooper Gomez would benefit from skilled physical therapy to address noted impairments in order to allow for full functional return to work and ADL-related activities  Thank you for the referral!    RE: 21  Johanna Villanueva has attended physical therapy for 8 visits  In this time, they have NOT made significant improvements in symptoms and function, as noted by subjective report, improved balance, ROM, strength, flexibility and activity tolerance  They have NOT made positive goal progress with FOTO score decrease from 33 at initial evaluation to 30 currently  Recommend d/c to HEP at this time  Impairments: abnormal gait, abnormal muscle firing, abnormal or restricted ROM, activity intolerance, impaired balance, impaired physical strength, lacks appropriate home exercise program, pain with function, weight-bearing intolerance and poor body mechanics  Barriers to therapy: n/a  Understanding of Dx/Px/POC: excellent  Goals  ST   Independent with HEP in 2 weeks - met  2   Decrease pain by 50% in 3 wks - not met  3   Increase extension ROM by 5 degrees in 3 weeks - not met  4  Increase flexion ROM by 20 degrees in 3 weeks  - met    LT  Achieve FOTO score of 53/100 in 6 weeks - not met (30/100)  2   Able to perform sit to stand without pain in 6 weeks - not met  3  Able to walk for household distances without pain in 6 weeks  - not met      Plan  Plan details: D/c to HEP with f/u with orthopedic provider  Patient would benefit from: skilled physical therapy  Planned modality interventions: cryotherapy, electrical stimulation/Russian stimulation and thermotherapy: hydrocollator packs  Planned therapy interventions: abdominal trunk stabilization, manual therapy, neuromuscular re-education, therapeutic activities, therapeutic exercise, body mechanics training and home exercise program  Treatment plan discussed with: patient        Subjective Evaluation    History of Present Illness  Mechanism of injury: Pt c/o chronic R knee pain with recent exacerbation in the previous few months  She has a h/o R proximal tibial fx, R knee OA, pes anserine bursitis and right Achilles tendinitis with Abad deformity  She has been seen in this clinic for PT for the tibial fx, bursitis and achilles tendinitis with good results  2021 R knee imaging: Mild osteoarthritis medial compartment  She received a cortisone injection to R knee on 3/18  With very mild relief from injection  Pain surrounds knee joint anteriorly and mediall  2/10 pain with rest, 8/10 shooting pain with activity  Aggravated with bending up and down, standing, walking, stairs (step to pattern)  Eased mildly with ice and OTC pain medication  Denies falls, catching or locking  Occasional nonpainful clicking and feelings of instability secondary to weakness and pain  She is currently planning to move to [facility] in May so she is active in her home with packing, which also aggravates the knee  She also enjoys walking, which is very limited due to pain        RE: 4/22/21  Pt reports she has had only slight improvement in her knee pain since beginning therapy  She continues to have days of moderate pain in the entire knee as well as feelings of instability/movement in the superior knee  Pain is unchanged with rest, stretching, stim/ice, laser therapy and exercise  She is moving to [facility] in several weeks and due to lack of progress with therapy in the past few weeks, plan for d/c today with f/u at an orthopedic doctor in [facility]  Patient Goals  Patient goals for therapy: decreased edema, decreased pain, improved balance, increased motion, return to sport/leisure activities and increased strength          Objective     Tenderness     Additional Tenderness Details  TTP anterior joint line, medial joint line, pes anserine    (-) anterior, posterior drawer  (-) varus, valgus stress  (-) medial, lateral mcmurrays    Active SLR R to 90 deg with mild extensor lag  Active Range of Motion   Left Knee   Flexion: 125 degrees WFL  Extension: 0 degrees WFL    Right Knee   Flexion: 118 degrees with pain  Extension: 4 degrees with pain    Additional Active Range of Motion Details  Quad set with end range extension painful      Strength/Myotome Testing     Left Hip   Planes of Motion   Flexion: 5  Extension: 4+  Abduction: 5  Adduction: 5  External rotation: 5  Internal rotation: 5    Right Hip   Planes of Motion   Flexion: 5  Extension: 4+  Abduction: 4+  Adduction: 5  External rotation: 4-  Internal rotation: 5    Left Knee   Flexion: 5  Extension: 5  Quadriceps contraction: good    Right Knee   Flexion: 5  Extension: 5  Quadriceps contraction: fair    Left Ankle/Foot   Dorsiflexion: 5    Right Ankle/Foot   Dorsiflexion: 5    Functional Assessment        Comments  Gait: antalgic, decreased RLE stance phase    Sit to stand: painful RLE             Precautions: h/o R knee medial OA, pes anserine bursitis, proximal tibia fx (2020), achilles tendinitis    HEP:  Access Code: [email or web address]  URL: [email or web address]  Date: 03/25/2021  Prepared by: Gardenia Schlatter    Exercises  Hip Flexor Stretch on Step - 2 reps - 30 hold  Seated Hamstring Stretch - 2 reps - 30 hold  Supine Gluteus Stretch - 2 reps - 30 hold  Supine Quad Set - 15 reps - 5 hold  Seated Isometric Knee Extension - 3 reps - 45 hold         3/25 4/1 4/5 4/8 4/12 4/15 4/19 4/22     Manuals             [facility]      HS, glute stretch  [address]        Patellar mob      [facility] OBIE      laser  [address] [facility] [address]      RE        OBIE       30' 30' 15' 15' 15' 10' 38'                  Neuro Re-Ed             Quad set  5"x20 5"x20 5"x20 5"x20 10"x10       SLS             Foam walk                                                                              Ther Ex             bike  8' 8' 8' 8' 8' 8' 8'     Hip flexor stretch  EOT w strap 30"x3           HS stretch Strap 30"x2   Strap 20"x3 Strap 20"x3 Strap 30"x3 Strap 30"x3      glute stretch Cross-body 30"x2            SLR, VMO SLR    x10 ea x10 ea x12 ea x12 ea      S/L hip abd    x10 x10 x12 ea       bridge    x10 5" x10 5" x12 5" x12      LAQ iso at 60deg flex 3x45" After laser x20 After laser x30 After laser iso 45"x3 After laser iso 45"x3 After laser iso 45"x4 After laser     iso 45"x4                                                                                                 Ther Activity                                       Gait Training                                       Modalities             CP+TENS       10'

## 2021-04-26 ENCOUNTER — APPOINTMENT (OUTPATIENT)
Dept: PHYSICAL THERAPY | Facility: CLINIC | Age: 71
End: 2021-04-26
Payer: MEDICARE

## 2021-04-29 ENCOUNTER — APPOINTMENT (OUTPATIENT)
Dept: PHYSICAL THERAPY | Facility: CLINIC | Age: 71
End: 2021-04-29
Payer: MEDICARE

## 2021-10-28 DIAGNOSIS — J45.21 MILD INTERMITTENT ASTHMA WITH ACUTE EXACERBATION: ICD-10-CM

## 2021-10-29 RX ORDER — MONTELUKAST SODIUM 10 MG/1
10 TABLET ORAL
Qty: 90 TABLET | Refills: 3 | Status: SHIPPED | OUTPATIENT
Start: 2021-10-29

## 2021-11-01 ENCOUNTER — APPOINTMENT (RX ONLY)
Dept: URBAN - METROPOLITAN AREA CLINIC 79 | Facility: CLINIC | Age: 71
Setting detail: DERMATOLOGY
End: 2021-11-01

## 2021-11-01 DIAGNOSIS — L81.4 OTHER MELANIN HYPERPIGMENTATION: ICD-10-CM

## 2021-11-01 DIAGNOSIS — D18.0 HEMANGIOMA: ICD-10-CM

## 2021-11-01 DIAGNOSIS — Z71.89 OTHER SPECIFIED COUNSELING: ICD-10-CM

## 2021-11-01 DIAGNOSIS — L82.1 OTHER SEBORRHEIC KERATOSIS: ICD-10-CM

## 2021-11-01 DIAGNOSIS — L91.8 OTHER HYPERTROPHIC DISORDERS OF THE SKIN: ICD-10-CM

## 2021-11-01 PROBLEM — D18.01 HEMANGIOMA OF SKIN AND SUBCUTANEOUS TISSUE: Status: ACTIVE | Noted: 2021-11-01

## 2021-11-01 PROBLEM — D48.5 NEOPLASM OF UNCERTAIN BEHAVIOR OF SKIN: Status: ACTIVE | Noted: 2021-11-01

## 2021-11-01 PROCEDURE — ? FULL BODY SKIN EXAM

## 2021-11-01 PROCEDURE — 99203 OFFICE O/P NEW LOW 30 MIN: CPT | Mod: 25

## 2021-11-01 PROCEDURE — ? COUNSELING

## 2021-11-01 PROCEDURE — 11102 TANGNTL BX SKIN SINGLE LES: CPT

## 2021-11-01 PROCEDURE — ? BIOPSY BY SHAVE METHOD

## 2021-11-01 PROCEDURE — ? SUNSCREEN RECOMMENDATIONS

## 2021-11-01 ASSESSMENT — LOCATION ZONE DERM
LOCATION ZONE: ARM
LOCATION ZONE: TRUNK
LOCATION ZONE: NECK

## 2021-11-01 ASSESSMENT — LOCATION SIMPLE DESCRIPTION DERM
LOCATION SIMPLE: LEFT FOREARM
LOCATION SIMPLE: RIGHT FOREARM
LOCATION SIMPLE: ABDOMEN
LOCATION SIMPLE: RIGHT UPPER BACK
LOCATION SIMPLE: RIGHT ANTERIOR NECK
LOCATION SIMPLE: LEFT UPPER BACK

## 2021-11-01 ASSESSMENT — LOCATION DETAILED DESCRIPTION DERM
LOCATION DETAILED: LEFT LATERAL ABDOMEN
LOCATION DETAILED: RIGHT PROXIMAL DORSAL FOREARM
LOCATION DETAILED: LEFT DISTAL DORSAL FOREARM
LOCATION DETAILED: RIGHT INFERIOR LATERAL NECK
LOCATION DETAILED: RIGHT SUPERIOR UPPER BACK
LOCATION DETAILED: LEFT SUPERIOR UPPER BACK

## 2021-11-01 NOTE — PROCEDURE: SUNSCREEN RECOMMENDATIONS

## 2021-11-01 NOTE — PROCEDURE: BIOPSY BY SHAVE METHOD
Detail Level: Detailed
Depth Of Biopsy: dermis
Was A Bandage Applied: Yes
Size Of Lesion In Cm: 0.4
X Size Of Lesion In Cm: 0
Anticipated Plan (Based On Presumed Biopsy Results): Mohs if positive
Biopsy Type: H and E
Biopsy Method: Personna blade
Anesthesia Type: 1% lidocaine with epinephrine
Anesthesia Volume In Cc (Will Not Render If 0): 0.5
Hemostasis: Chitra's
Wound Care: Petrolatum
Dressing: bandage
Destruction After The Procedure: No
Type Of Destruction Used: Curettage
Curettage Text: The wound bed was treated with curettage after the biopsy was performed.
Cryotherapy Text: The wound bed was treated with cryotherapy after the biopsy was performed.
Electrodesiccation Text: The wound bed was treated with electrodesiccation after the biopsy was performed.
Electrodesiccation And Curettage Text: The wound bed was treated with electrodesiccation and curettage after the biopsy was performed.
Silver Nitrate Text: The wound bed was treated with silver nitrate after the biopsy was performed.
Lab: 6
Lab Facility: [id number]
Consent: Written consent was obtained and risks were reviewed including but not limited to scarring, infection, bleeding, scabbing, incomplete removal, nerve damage and allergy to anesthesia.
Post-Care Instructions: I reviewed with the patient in detail post-care instructions. Patient is to keep the biopsy site dry overnight, and then apply bacitracin twice daily until healed. Patient may apply hydrogen peroxide soaks to remove any crusting.
Notification Instructions: Patient will be notified of biopsy results. However, patient instructed to call the office if not contacted within 2 weeks.
Billing Type: Third-Party Bill
Information: Selecting Yes will display possible errors in your note based on the variables you have selected. This validation is only offered as a suggestion for you. PLEASE NOTE THAT THE VALIDATION TEXT WILL BE REMOVED WHEN YOU FINALIZE YOUR NOTE. IF YOU WANT TO FAX A PRELIMINARY NOTE YOU WILL NEED TO TOGGLE THIS TO 'NO' IF YOU DO NOT WANT IT IN YOUR FAXED NOTE.

## 2022-02-01 ENCOUNTER — APPOINTMENT (RX ONLY)
Dept: URBAN - METROPOLITAN AREA CLINIC 79 | Facility: CLINIC | Age: 72
Setting detail: DERMATOLOGY
End: 2022-02-01

## 2022-02-01 VITALS — DIASTOLIC BLOOD PRESSURE: 103 MMHG | SYSTOLIC BLOOD PRESSURE: 177 MMHG | HEART RATE: 94 BPM

## 2022-02-01 DIAGNOSIS — R03.0 ELEVATED BLOOD-PRESSURE READING, WITHOUT DIAGNOSIS OF HYPERTENSION: ICD-10-CM

## 2022-02-01 PROCEDURE — ? ADDITIONAL NOTES

## 2022-02-01 PROCEDURE — 99212 OFFICE O/P EST SF 10 MIN: CPT

## 2022-02-01 PROCEDURE — ? CONSULTATION FOR SURGICAL REPAIR

## 2022-02-01 PROCEDURE — ? REPAIR NOTE

## 2022-02-01 PROCEDURE — ? COUNSELING

## 2022-02-01 NOTE — PROCEDURE: REPAIR NOTE
MILD. Island Pedicle Flap With Canthal Suspension Text: The defect edges were debeveled with a #15 scalpel blade.  Given the location of the defect, shape of the defect and the proximity to free margins an island pedicle advancement flap was deemed most appropriate.  Using a sterile surgical marker, an appropriate advancement flap was drawn incorporating the defect, outlining the appropriate donor tissue and placing the expected incisions within the relaxed skin tension lines where possible. The area thus outlined was incised deep to adipose tissue with a #15 scalpel blade.  The skin margins were undermined to an appropriate distance in all directions around the primary defect and laterally outward around the island pedicle utilizing iris scissors.  There was minimal undermining beneath the pedicle flap. A suspension suture was placed in the canthal tendon to prevent tension and prevent ectropion.

## 2022-02-01 NOTE — PROCEDURE: CONSULTATION FOR SURGICAL REPAIR
Detail Level: Detailed
X Size Of Defect In Cm (Optional): 0
Referring Provider (Optional): DR. DAIGLE

## 2022-02-01 NOTE — PROCEDURE: ADDITIONAL NOTES
Additional Notes: Patient consent was obtained to proceed with the visit and recommended plan of care after discussion of all risks and benefits, including the risks of COVID-19 exposure.
Detail Level: Simple
Render Risk Assessment In Note?: no
Additional Notes: Based on today's blood pressure readings, the planned Mohs surgery will be postponed until patient has seen or is evaluated by the PCP.\\n\\nAssociated symptoms include: patient denies any symptoms associated with high blood pressure; no headache, dizziness, shortness of breath or chest pains, etc.  \\n\\n___X__ Return to PCP for evaluation/clearance (date/time): February 8,2022 with Dr. Orozco\\n                                                                            \\nOR\\n\\n_____ Patient sent directly to Emergency Room

## 2022-07-19 ENCOUNTER — APPOINTMENT (RX ONLY)
Dept: URBAN - METROPOLITAN AREA CLINIC 79 | Facility: CLINIC | Age: 72
Setting detail: DERMATOLOGY
End: 2022-07-19

## 2022-07-19 VITALS — DIASTOLIC BLOOD PRESSURE: 69 MMHG | HEART RATE: 81 BPM | SYSTOLIC BLOOD PRESSURE: 117 MMHG

## 2022-07-19 PROBLEM — C44.319 BASAL CELL CARCINOMA OF SKIN OF OTHER PARTS OF FACE: Status: ACTIVE | Noted: 2022-07-19

## 2022-07-19 PROCEDURE — ? REPAIR NOTE

## 2022-07-19 PROCEDURE — 17311 MOHS 1 STAGE H/N/HF/G: CPT

## 2022-07-19 PROCEDURE — ? PRESCRIPTION

## 2022-07-19 PROCEDURE — ? MOHS SURGERY

## 2022-07-19 RX ORDER — MUPIROCIN 20 MG/G
OINTMENT TOPICAL BID
Qty: 22 | Refills: 2 | Status: ERX | COMMUNITY
Start: 2022-07-19

## 2022-07-19 RX ADMIN — MUPIROCIN: 20 OINTMENT TOPICAL at 00:00

## 2022-07-19 NOTE — PROCEDURE: MOHS SURGERY
Mohs Method Verbiage: An incision at a 90 degree angle following the standard Mohs approach was done and the specimen was harvested as a microscopic controlled layer. All histological findings and if present, perineural invasion or presence of scar is noted on the Mohs Map.

## 2022-07-19 NOTE — PROCEDURE: MOHS SURGERY
Steady Melolabial Interpolation Flap Text: A decision was made to reconstruct the defect utilizing an interpolation axial flap and a staged reconstruction.  A telfa template was made of the defect.  This telfa template was then used to outline the melolabial interpolation flap.  The donor area for the pedicle flap was then injected with anesthesia.  The flap was excised through the skin and subcutaneous tissue down to the layer of the underlying musculature.  The pedicle flap was carefully excised within this deep plane to maintain its blood supply.  The edges of the donor site were undermined.   The donor site was closed in a primary fashion.  The pedicle was then rotated into position and sutured.  Once the tube was sutured into place, adequate blood supply was confirmed with blanching and refill.  The pedicle was then wrapped with xeroform gauze and dressed appropriately with a telfa and gauze bandage to ensure continued blood supply and protect the attached pedicle.

## 2022-07-19 NOTE — PROCEDURE: REPAIR NOTE
Passed Cartilage Graft Text: The defect edges were debeveled with a #15 scalpel blade.  Given the location of the defect, shape of the defect, the fact the defect involved a full thickness cartilage defect a cartilage graft was deemed most appropriate.  An appropriate donor site was identified, cleansed, and anesthetized. The cartilage graft was then harvested and transferred to the recipient site, oriented appropriately and then sutured into place.  The secondary defect was then repaired using a primary closure.

## 2022-07-19 NOTE — PROCEDURE: REPAIR NOTE
Did receive handouts for Lopressor and Procardia. Did discuss insulin now on mealtime, aware of Glucophage and renal issues. Also aware not to use Motrin for pain.  did assist with adls. [phone] Melolabial Interpolation Flap Text: A decision was made to reconstruct the defect utilizing an interpolation axial flap and a staged reconstruction.  A telfa template was made of the defect.  This telfa template was then used to outline the melolabial interpolation flap.  The donor area for the pedicle flap was then injected with anesthesia.  The flap was excised through the skin and subcutaneous tissue down to the layer of the underlying musculature.  The pedicle flap was carefully excised within this deep plane to maintain its blood supply.  The edges of the donor site were undermined.   The donor site was closed in a primary fashion.  The pedicle was then rotated into position and sutured.  Once the tube was sutured into place, adequate blood supply was confirmed with blanching and refill.  The pedicle was then wrapped with xeroform gauze and dressed appropriately with a telfa and gauze bandage to ensure continued blood supply and protect the attached pedicle.

## 2022-07-20 ENCOUNTER — APPOINTMENT (RX ONLY)
Dept: URBAN - METROPOLITAN AREA CLINIC 352 | Facility: CLINIC | Age: 72
Setting detail: DERMATOLOGY
End: 2022-07-20

## 2022-07-20 VITALS — HEART RATE: 114 BPM | DIASTOLIC BLOOD PRESSURE: 82 MMHG | SYSTOLIC BLOOD PRESSURE: 128 MMHG

## 2022-07-20 PROBLEM — C44.319 BASAL CELL CARCINOMA OF SKIN OF OTHER PARTS OF FACE: Status: ACTIVE | Noted: 2022-07-20

## 2022-07-20 PROCEDURE — 99203 OFFICE O/P NEW LOW 30 MIN: CPT | Mod: 25

## 2022-07-20 PROCEDURE — ? CONSULTATION FOR SURGICAL REPAIR

## 2022-07-20 PROCEDURE — ? REPAIR NOTE

## 2022-07-20 PROCEDURE — 14040 TIS TRNFR F/C/C/M/N/A/G/H/F: CPT

## 2022-07-20 PROCEDURE — ? ADDITIONAL NOTES

## 2022-07-20 NOTE — PROCEDURE: REPAIR NOTE
Suturegard Intro: Intraoperative tissue expansion was performed, utilizing the SUTUREGARD device, in order to reduce wound tension. Undermining Type: Entire Wound

## 2022-07-29 ENCOUNTER — APPOINTMENT (RX ONLY)
Dept: URBAN - METROPOLITAN AREA CLINIC 79 | Facility: CLINIC | Age: 72
Setting detail: DERMATOLOGY
End: 2022-07-29

## 2022-07-29 DIAGNOSIS — Z48.817 ENCOUNTER FOR SURGICAL AFTERCARE FOLLOWING SURGERY ON THE SKIN AND SUBCUTANEOUS TISSUE: ICD-10-CM

## 2022-07-29 PROCEDURE — ? POST-OP WOUND CHECK

## 2022-07-29 PROCEDURE — ? ADDITIONAL NOTES

## 2022-07-29 PROCEDURE — ? TREATMENT REGIMEN

## 2022-07-29 PROCEDURE — 99024 POSTOP FOLLOW-UP VISIT: CPT

## 2022-07-29 ASSESSMENT — LOCATION DETAILED DESCRIPTION DERM: LOCATION DETAILED: RIGHT INFERIOR NASAL CHEEK

## 2022-07-29 ASSESSMENT — LOCATION ZONE DERM: LOCATION ZONE: FACE

## 2022-07-29 ASSESSMENT — LOCATION SIMPLE DESCRIPTION DERM: LOCATION SIMPLE: RIGHT CHEEK

## 2022-07-29 NOTE — PROCEDURE: POST-OP WOUND CHECK
Add 57425 Cpt? (Important Note: In 2017 The Use Of 18932 Is Being Tracked By Cms To Determine Future Global Period Reimbursement For Global Periods): yes
Additional Comments: Crescentic Advancement Flap by Dr. Bryant
Wound Evaluated By: Becca UP
Detail Level: Detailed

## 2022-08-12 ENCOUNTER — APPOINTMENT (RX ONLY)
Dept: URBAN - METROPOLITAN AREA CLINIC 79 | Facility: CLINIC | Age: 72
Setting detail: DERMATOLOGY
End: 2022-08-12

## 2022-08-12 DIAGNOSIS — Z48.817 ENCOUNTER FOR SURGICAL AFTERCARE FOLLOWING SURGERY ON THE SKIN AND SUBCUTANEOUS TISSUE: ICD-10-CM

## 2022-08-12 PROCEDURE — 99024 POSTOP FOLLOW-UP VISIT: CPT

## 2022-08-12 PROCEDURE — ? POST-OP WOUND CHECK

## 2022-08-12 PROCEDURE — ? TREATMENT REGIMEN

## 2022-08-12 PROCEDURE — ? ADDITIONAL NOTES

## 2022-08-12 ASSESSMENT — LOCATION SIMPLE DESCRIPTION DERM: LOCATION SIMPLE: RIGHT CHEEK

## 2022-08-12 ASSESSMENT — LOCATION DETAILED DESCRIPTION DERM: LOCATION DETAILED: RIGHT INFERIOR NASAL CHEEK

## 2022-08-12 ASSESSMENT — LOCATION ZONE DERM: LOCATION ZONE: FACE

## 2022-08-12 NOTE — PROCEDURE: POST-OP WOUND CHECK
Detail Level: Detailed
Add 93667 Cpt? (Important Note: In 2017 The Use Of 28475 Is Being Tracked By Cms To Determine Future Global Period Reimbursement For Global Periods): yes

## 2022-11-01 ENCOUNTER — APPOINTMENT (RX ONLY)
Dept: URBAN - METROPOLITAN AREA CLINIC 79 | Facility: CLINIC | Age: 72
Setting detail: DERMATOLOGY
End: 2022-11-01

## 2022-11-01 DIAGNOSIS — D22 MELANOCYTIC NEVI: ICD-10-CM

## 2022-11-01 DIAGNOSIS — D18.0 HEMANGIOMA: ICD-10-CM

## 2022-11-01 DIAGNOSIS — L85.3 XEROSIS CUTIS: ICD-10-CM

## 2022-11-01 DIAGNOSIS — L91.8 OTHER HYPERTROPHIC DISORDERS OF THE SKIN: ICD-10-CM

## 2022-11-01 DIAGNOSIS — L81.5 LEUKODERMA, NOT ELSEWHERE CLASSIFIED: ICD-10-CM

## 2022-11-01 DIAGNOSIS — Z85.828 PERSONAL HISTORY OF OTHER MALIGNANT NEOPLASM OF SKIN: ICD-10-CM

## 2022-11-01 DIAGNOSIS — L82.1 OTHER SEBORRHEIC KERATOSIS: ICD-10-CM

## 2022-11-01 DIAGNOSIS — L81.4 OTHER MELANIN HYPERPIGMENTATION: ICD-10-CM

## 2022-11-01 PROBLEM — D18.01 HEMANGIOMA OF SKIN AND SUBCUTANEOUS TISSUE: Status: ACTIVE | Noted: 2022-11-01

## 2022-11-01 PROBLEM — D22.5 MELANOCYTIC NEVI OF TRUNK: Status: ACTIVE | Noted: 2022-11-01

## 2022-11-01 PROCEDURE — ? FULL BODY SKIN EXAM

## 2022-11-01 PROCEDURE — ? COUNSELING

## 2022-11-01 PROCEDURE — 99213 OFFICE O/P EST LOW 20 MIN: CPT

## 2022-11-01 PROCEDURE — ? TREATMENT REGIMEN

## 2022-11-01 PROCEDURE — ? PRESCRIPTION

## 2022-11-01 RX ORDER — AMMONIUM LACTATE 12 G/100G
LOTION TOPICAL
Qty: 400 | Refills: 2 | Status: ERX | COMMUNITY
Start: 2022-11-01

## 2022-11-01 RX ADMIN — AMMONIUM LACTATE: 12 LOTION TOPICAL at 00:00

## 2022-11-01 ASSESSMENT — LOCATION ZONE DERM
LOCATION ZONE: TRUNK
LOCATION ZONE: ARM
LOCATION ZONE: NECK

## 2022-11-01 ASSESSMENT — LOCATION DETAILED DESCRIPTION DERM
LOCATION DETAILED: RIGHT PROXIMAL DORSAL FOREARM
LOCATION DETAILED: LEFT DISTAL DORSAL FOREARM
LOCATION DETAILED: RIGHT INFERIOR ANTERIOR NECK
LOCATION DETAILED: SUPERIOR THORACIC SPINE
LOCATION DETAILED: EPIGASTRIC SKIN
LOCATION DETAILED: INFERIOR THORACIC SPINE
LOCATION DETAILED: UPPER STERNUM

## 2022-11-01 ASSESSMENT — LOCATION SIMPLE DESCRIPTION DERM
LOCATION SIMPLE: UPPER BACK
LOCATION SIMPLE: RIGHT ANTERIOR NECK
LOCATION SIMPLE: LEFT FOREARM
LOCATION SIMPLE: CHEST
LOCATION SIMPLE: ABDOMEN
LOCATION SIMPLE: RIGHT FOREARM

## 2023-01-31 ENCOUNTER — TELEPHONE (OUTPATIENT)
Dept: FAMILY MEDICINE CLINIC | Facility: HOSPITAL | Age: 73
End: 2023-01-31

## 2023-02-24 ENCOUNTER — TELEPHONE (OUTPATIENT)
Dept: FAMILY MEDICINE CLINIC | Facility: HOSPITAL | Age: 73
End: 2023-02-24

## 2023-02-24 NOTE — TELEPHONE ENCOUNTER
NATALIE, A FEW WEEKS AGO I CALLED [facility] TO SCHEDULE AN APPT   (HAVEN'T SEEN HER IN AWHILE) SHE HAS MOVED TO [place] AND HER NEW DOCTOR IS:  DR Lamas    ** SHE WILL SIGN A RELEASE FORM DOWN THERE TO GET HER RECORDS TRANSFERRED

## 2023-03-01 NOTE — TELEPHONE ENCOUNTER
03/01/23 7:13 AM        The office's request has been received, reviewed, and the patient chart updated  The PCP has successfully been removed with a patient attribution note  This message will now be completed          Thank you  Alberto Snider

## 2023-03-10 NOTE — PROCEDURE: REPAIR NOTE
Additional Anesthesia Volume In Cc: 9 no back pain, no gout, no musculoskeletal pain, no neck pain, and no weakness.

## 2023-05-15 ENCOUNTER — APPOINTMENT (RX ONLY)
Dept: URBAN - METROPOLITAN AREA CLINIC 79 | Facility: CLINIC | Age: 73
Setting detail: DERMATOLOGY
End: 2023-05-15

## 2023-05-15 DIAGNOSIS — Z85.828 PERSONAL HISTORY OF OTHER MALIGNANT NEOPLASM OF SKIN: ICD-10-CM

## 2023-05-15 DIAGNOSIS — L82.1 OTHER SEBORRHEIC KERATOSIS: ICD-10-CM

## 2023-05-15 DIAGNOSIS — D18.0 HEMANGIOMA: ICD-10-CM

## 2023-05-15 DIAGNOSIS — L81.5 LEUKODERMA, NOT ELSEWHERE CLASSIFIED: ICD-10-CM

## 2023-05-15 DIAGNOSIS — D22 MELANOCYTIC NEVI: ICD-10-CM

## 2023-05-15 DIAGNOSIS — L81.4 OTHER MELANIN HYPERPIGMENTATION: ICD-10-CM

## 2023-05-15 PROBLEM — D18.01 HEMANGIOMA OF SKIN AND SUBCUTANEOUS TISSUE: Status: ACTIVE | Noted: 2023-05-15

## 2023-05-15 PROBLEM — D23.72 OTHER BENIGN NEOPLASM OF SKIN OF LEFT LOWER LIMB, INCLUDING HIP: Status: ACTIVE | Noted: 2023-05-15

## 2023-05-15 PROBLEM — D22.5 MELANOCYTIC NEVI OF TRUNK: Status: ACTIVE | Noted: 2023-05-15

## 2023-05-15 PROCEDURE — ? COUNSELING

## 2023-05-15 PROCEDURE — ? FULL BODY SKIN EXAM

## 2023-05-15 PROCEDURE — ? TREATMENT REGIMEN

## 2023-05-15 PROCEDURE — 99213 OFFICE O/P EST LOW 20 MIN: CPT

## 2023-05-15 ASSESSMENT — LOCATION SIMPLE DESCRIPTION DERM
LOCATION SIMPLE: UPPER BACK
LOCATION SIMPLE: CHEST
LOCATION SIMPLE: RIGHT FOREARM
LOCATION SIMPLE: LEFT FOREARM
LOCATION SIMPLE: ABDOMEN

## 2023-05-15 ASSESSMENT — LOCATION DETAILED DESCRIPTION DERM
LOCATION DETAILED: UPPER STERNUM
LOCATION DETAILED: INFERIOR THORACIC SPINE
LOCATION DETAILED: RIGHT PROXIMAL DORSAL FOREARM
LOCATION DETAILED: EPIGASTRIC SKIN
LOCATION DETAILED: SUPERIOR THORACIC SPINE
LOCATION DETAILED: LEFT DISTAL DORSAL FOREARM

## 2023-05-15 ASSESSMENT — LOCATION ZONE DERM
LOCATION ZONE: TRUNK
LOCATION ZONE: ARM

## 2023-11-14 NOTE — PROCEDURE: REPAIR NOTE
Double O-Z Plasty Text: The defect edges were debeveled with a #15 scalpel blade.  Given the location of the defect, shape of the defect and the proximity to free margins a Double O-Z plasty (double transposition flap) was deemed most appropriate.  Using a sterile surgical marker, the appropriate transposition flaps were drawn incorporating the defect and placing the expected incisions within the relaxed skin tension lines where possible. The area thus outlined was incised deep to adipose tissue with a #15 scalpel blade.  The skin margins were undermined to an appropriate distance in all directions utilizing iris scissors.  Hemostasis was achieved with electrocautery.  The flaps were then transposed into place, one clockwise and the other counterclockwise, and anchored with interrupted buried subcutaneous sutures. Use Enhanced Counseling Feature (Automatic): No Xerosis Aggressive Treatment: I recommended application of Cetaphil or CeraVe numerous times a day going to bed to all dry areas. I also prescribed a topical steroid for twice daily use. Hypertriglyceridemia Treatment: I explained this is common when taking isotretinoin. If this worsens they will contact us. They may try OTC ibuprofen. Patient Weight (Optional But Required For Cumulative Dose-Numbers And Decimals Only): 152 Headache Monitoring: I recommended monitoring the headaches for now. There is no evidence of increased intracranial pressure. They were instructed to call if the headaches are worsening. Retinoid Dermatitis Normal Treatment: I recommended more frequent application of Cetaphil or CeraVe to the areas of dermatitis. Nosebleeds Normal Treatment: I explained this is common when taking isotretinoin. I recommended saline mist in each nostril multiple times a day. If this worsens they will contact us. Is Cheilitis Present?: Yes - Normal Treatment Lower Range (In Mg/Kg): 120 Weight Units: pounds Add Associated Diagnosis When Managing Medication Side Effects: Yes Retinoid Dermatitis Aggressive Treatment: I recommended more frequent application of Cetaphil or CeraVe to the areas of dermatitis. I also prescribed a topical steroid for twice daily use until the dermatitis resolves. Dosing Month 1 (Required For Cumulative Dosing): 30mg Daily Hypercholesterolemia Monitoring: I explained this is common when taking isotretinoin. We will monitor closely. Upper Range (In Mg/Kg): 150 Months Of Therapy Completed: 5 Target Cumulative Dosage (In Mg/Kg): 135 Dosing Month 2 (Required For Cumulative Dosing): 60mg Daily Cheilitis Normal Treatment: I recommended application of Vaseline or Aquaphor numerous times a day (as often as every hour) and before going to bed. Cheilitis Aggressive Treatment: I recommended application of Vaseline or Aquaphor numerous times a day (as often as every hour) and before going to bed. I also prescribed a topical steroid for twice daily use. Comments: Completed 5 months, starting month 6 Myalgia Monitoring: I explained this is common when taking isotretinoin. If this worsens they will contact us. Xerosis Normal Treatment: I recommended application of Cetaphil or CeraVe numerous times a day and before going to bed to all dry areas. Counseling Text: I reviewed the side effect in detail. Patient should get monthly blood tests, not donate blood, not drive at night if vision affected, and not share medication. Detail Level: Zone Xerosis Aggressive Treatment: I recommended application of Cetaphil or CeraVe numerous times a day and before going to bed to all dry areas. I also prescribed a topical steroid for twice daily use. Female Pregnancy Counseling Text: Female patients should also be on two forms of birth control while taking this medication and for one month after their last dose. What Is The Patient's Gender: Female Pounds Preamble Statement (Weight Entered In Details Tab): Reported Weight in pounds: Kilograms Preamble Statement (Weight Entered In Details Tab): Reported Weight in kilograms: Female Completion Statement: After discussing her treatment course we decided to discontinue isotretinoin therapy at this time. I explained that she would need to continue her birth control methods for at least one month after the last dosage. She should also get a pregnancy test one month after the last dose. She shouldn't donate blood for one month after the last dose. She should call with any new symptoms of depression. Male Completion Statement: After discussing his treatment course we decided to discontinue isotretinoin therapy at this time. He shouldn't donate blood for one month after the last dose. He should call with any new symptoms of depression. Are Labs Available For Review?: No- Labs Deferred This Month Xerosis Normal Treatment: I recommended application of Cetaphil or CeraVe numerous times a day going to bed to all dry areas. Use Therapeutic Ranged Or Therapeutic Target: please select Range or Target

## 2023-12-11 ENCOUNTER — APPOINTMENT (RX ONLY)
Dept: URBAN - METROPOLITAN AREA CLINIC 79 | Facility: CLINIC | Age: 73
Setting detail: DERMATOLOGY
End: 2023-12-11

## 2023-12-11 DIAGNOSIS — L81.4 OTHER MELANIN HYPERPIGMENTATION: ICD-10-CM

## 2023-12-11 DIAGNOSIS — L82.1 OTHER SEBORRHEIC KERATOSIS: ICD-10-CM

## 2023-12-11 DIAGNOSIS — L72.0 EPIDERMAL CYST: ICD-10-CM

## 2023-12-11 DIAGNOSIS — Z85.828 PERSONAL HISTORY OF OTHER MALIGNANT NEOPLASM OF SKIN: ICD-10-CM

## 2023-12-11 DIAGNOSIS — D18.0 HEMANGIOMA: ICD-10-CM

## 2023-12-11 DIAGNOSIS — D22 MELANOCYTIC NEVI: ICD-10-CM

## 2023-12-11 PROBLEM — D18.01 HEMANGIOMA OF SKIN AND SUBCUTANEOUS TISSUE: Status: ACTIVE | Noted: 2023-12-11

## 2023-12-11 PROBLEM — D22.5 MELANOCYTIC NEVI OF TRUNK: Status: ACTIVE | Noted: 2023-12-11

## 2023-12-11 PROCEDURE — ? TREATMENT REGIMEN

## 2023-12-11 PROCEDURE — 10060 I&D ABSCESS SIMPLE/SINGLE: CPT

## 2023-12-11 PROCEDURE — ? INCISION AND DRAINAGE

## 2023-12-11 PROCEDURE — 99213 OFFICE O/P EST LOW 20 MIN: CPT | Mod: 25

## 2023-12-11 PROCEDURE — ? FULL BODY SKIN EXAM

## 2023-12-11 PROCEDURE — ? COUNSELING

## 2023-12-11 PROCEDURE — ? MEDICARE ABN

## 2023-12-11 ASSESSMENT — LOCATION ZONE DERM
LOCATION ZONE: TRUNK
LOCATION ZONE: FACE

## 2023-12-11 ASSESSMENT — LOCATION DETAILED DESCRIPTION DERM
LOCATION DETAILED: SUPERIOR THORACIC SPINE
LOCATION DETAILED: EPIGASTRIC SKIN
LOCATION DETAILED: LEFT INFERIOR CENTRAL MALAR CHEEK
LOCATION DETAILED: INFERIOR THORACIC SPINE
LOCATION DETAILED: UPPER STERNUM

## 2023-12-11 ASSESSMENT — LOCATION SIMPLE DESCRIPTION DERM
LOCATION SIMPLE: ABDOMEN
LOCATION SIMPLE: UPPER BACK
LOCATION SIMPLE: CHEST
LOCATION SIMPLE: LEFT CHEEK

## 2023-12-11 NOTE — HPI: EVALUATION OF SKIN LESION(S)
Hpi Title: Evaluation of Skin Lesions
How Severe Are Your Spot(S)?: mild
Have Your Spot(S) Been Treated In The Past?: has not been treated
Hpi Title: Evaluation of a Skin Lesion
How Severe Are Your Spot(S)?: moderate

## 2023-12-11 NOTE — PROCEDURE: INCISION AND DRAINAGE
Detail Level: Detailed
Lesion Type: Cyst
Method: 11 blade
Curette: No
Anesthesia Type: 1% lidocaine with epinephrine
Anesthesia Volume In Cc: 0.8
Size Of Lesion In Cm (Optional But May Be Required For Some Insurances): 1
Drainage Amount?: minimal
Drainage Type?: keratinaceous
Wound Care: Petrolatum
Dressing: dry sterile dressing
Epidermal Sutures: 4-0 Ethilon
Epidermal Closure: simple interrupted
Suture Text: The incision was partially closed with
Preparation Text: The area was prepped in the usual clean fashion.
Curette Text (Optional): After the contents were expressed a curette was used to partially remove the cyst wall.
Consent was obtained and risks were reviewed including but not limited to delayed wound healing, infection, need for multiple I and D's, and pain.
Post-Care Instructions: I reviewed with the patient in detail post-care instructions. Patient should keep wound covered and call the office should any redness, pain, swelling or worsening occur.

## 2024-07-02 ENCOUNTER — APPOINTMENT (RX ONLY)
Dept: URBAN - METROPOLITAN AREA CLINIC 79 | Facility: CLINIC | Age: 74
Setting detail: DERMATOLOGY
End: 2024-07-02

## 2024-07-02 DIAGNOSIS — D18.0 HEMANGIOMA: ICD-10-CM

## 2024-07-02 DIAGNOSIS — L82.1 OTHER SEBORRHEIC KERATOSIS: ICD-10-CM

## 2024-07-02 DIAGNOSIS — Z85.828 PERSONAL HISTORY OF OTHER MALIGNANT NEOPLASM OF SKIN: ICD-10-CM

## 2024-07-02 DIAGNOSIS — L81.4 OTHER MELANIN HYPERPIGMENTATION: ICD-10-CM

## 2024-07-02 DIAGNOSIS — D22 MELANOCYTIC NEVI: ICD-10-CM

## 2024-07-02 PROBLEM — D18.01 HEMANGIOMA OF SKIN AND SUBCUTANEOUS TISSUE: Status: ACTIVE | Noted: 2024-07-02

## 2024-07-02 PROBLEM — D22.5 MELANOCYTIC NEVI OF TRUNK: Status: ACTIVE | Noted: 2024-07-02

## 2024-07-02 PROCEDURE — ? FULL BODY SKIN EXAM

## 2024-07-02 PROCEDURE — ? COUNSELING

## 2024-07-02 PROCEDURE — ? TREATMENT REGIMEN

## 2024-07-02 PROCEDURE — 99213 OFFICE O/P EST LOW 20 MIN: CPT

## 2024-07-02 ASSESSMENT — LOCATION SIMPLE DESCRIPTION DERM
LOCATION SIMPLE: ABDOMEN
LOCATION SIMPLE: UPPER BACK
LOCATION SIMPLE: CHEST

## 2024-07-02 ASSESSMENT — LOCATION ZONE DERM: LOCATION ZONE: TRUNK

## 2024-07-02 ASSESSMENT — LOCATION DETAILED DESCRIPTION DERM
LOCATION DETAILED: EPIGASTRIC SKIN
LOCATION DETAILED: INFERIOR THORACIC SPINE
LOCATION DETAILED: SUPERIOR THORACIC SPINE
LOCATION DETAILED: UPPER STERNUM

## 2025-07-07 NOTE — PROCEDURE: REPAIR NOTE
Caller: Shirley Vela    Relationship: Emergency Contact    Best call back number: [phone]    Requested Prescriptions:   MEDROL DOSE PACK     Pharmacy where request should be sent: [facility] [id number] - [place] [id number] AT [facility] [address]  - [phone]  - [phone] Posterior Auricular Interpolation Flap Division And Inset Text: Division and inset of the posterior auricular interpolation flap was performed to achieve optimal aesthetic result, restore normal anatomic appearance and avoid distortion of normal anatomy, expedite and facilitate wound healing, achieve optimal functional result and because linear closure either not possible or would produce suboptimal result. The patient was prepped and draped in the usual manner. The pedicle was infiltrated with local anesthesia. The pedicle was sectioned with a #15 blade. The pedicle was de-bulked and trimmed to match the shape of the defect. Hemostasis was achieved. The flap donor site and free margin of the flap were secured with deep buried sutures and the wound edges were re-approximated.